# Patient Record
Sex: FEMALE | Race: WHITE | NOT HISPANIC OR LATINO | Employment: OTHER | URBAN - METROPOLITAN AREA
[De-identification: names, ages, dates, MRNs, and addresses within clinical notes are randomized per-mention and may not be internally consistent; named-entity substitution may affect disease eponyms.]

---

## 2017-02-03 LAB
25(OH)D3+25(OH)D2 SERPL-MCNC: 66.7 NG/ML (ref 30–100)
ALBUMIN SERPL-MCNC: 4.3 G/DL (ref 3.6–4.8)
ALBUMIN/GLOB SERPL: 2.3 {RATIO} (ref 1.1–2.5)
ALP SERPL-CCNC: 63 IU/L (ref 39–117)
ALT SERPL-CCNC: 17 IU/L (ref 0–32)
AST SERPL-CCNC: 19 IU/L (ref 0–40)
BASOPHILS # BLD AUTO: 0.1 X10E3/UL (ref 0–0.2)
BASOPHILS NFR BLD AUTO: 1 %
BILIRUB SERPL-MCNC: 0.3 MG/DL (ref 0–1.2)
BUN SERPL-MCNC: 22 MG/DL (ref 8–27)
BUN/CREAT SERPL: 22 (ref 11–26)
CALCIUM SERPL-MCNC: 9.5 MG/DL (ref 8.7–10.3)
CHLORIDE SERPL-SCNC: 101 MMOL/L (ref 96–106)
CHOLEST SERPL-MCNC: 193 MG/DL (ref 100–199)
CO2 SERPL-SCNC: 26 MMOL/L (ref 18–29)
COMMENT 011824: ABNORMAL
CREAT SERPL-MCNC: 0.98 MG/DL (ref 0.57–1)
EOSINOPHIL # BLD AUTO: 0.2 X10E3/UL (ref 0–0.4)
EOSINOPHIL NFR BLD AUTO: 5 %
ERYTHROCYTE [DISTWIDTH] IN BLOOD BY AUTOMATED COUNT: 13.5 % (ref 12.3–15.4)
GLOBULIN SER CALC-MCNC: 1.9 G/DL (ref 1.5–4.5)
GLUCOSE SERPL-MCNC: 107 MG/DL (ref 65–99)
HBA1C MFR BLD: 6 % (ref 4.8–5.6)
HCT VFR BLD AUTO: 46.5 % (ref 34–46.6)
HDLC SERPL-MCNC: 62 MG/DL
HGB BLD-MCNC: 15.2 G/DL (ref 11.1–15.9)
IMM GRANULOCYTES # BLD: 0 X10E3/UL (ref 0–0.1)
IMM GRANULOCYTES NFR BLD: 0 %
IMMATURE CELLS  115398: NORMAL
LDLC SERPL CALC-MCNC: 115 MG/DL (ref 0–99)
LYMPHOCYTES # BLD AUTO: 1.5 X10E3/UL (ref 0.7–3.1)
LYMPHOCYTES NFR BLD AUTO: 31 %
MCH RBC QN AUTO: 30.8 PG (ref 26.6–33)
MCHC RBC AUTO-ENTMCNC: 32.7 G/DL (ref 31.5–35.7)
MCV RBC AUTO: 94 FL (ref 79–97)
MONOCYTES # BLD AUTO: 0.3 X10E3/UL (ref 0.1–0.9)
MONOCYTES NFR BLD AUTO: 6 %
MORPHOLOGY BLD-IMP: NORMAL
NEUTROPHILS # BLD AUTO: 2.8 X10E3/UL (ref 1.4–7)
NEUTROPHILS NFR BLD AUTO: 57 %
NRBC BLD AUTO-RTO: NORMAL %
PLATELET # BLD AUTO: 172 X10E3/UL (ref 150–379)
POTASSIUM SERPL-SCNC: 4.8 MMOL/L (ref 3.5–5.2)
PROT SERPL-MCNC: 6.2 G/DL (ref 6–8.5)
RBC # BLD AUTO: 4.94 X10E6/UL (ref 3.77–5.28)
SODIUM SERPL-SCNC: 141 MMOL/L (ref 134–144)
TRIGL SERPL-MCNC: 82 MG/DL (ref 0–149)
TSH SERPL DL<=0.005 MIU/L-ACNC: 2.16 UIU/ML (ref 0.45–4.5)
VLDLC SERPL CALC-MCNC: 16 MG/DL (ref 5–40)
WBC # BLD AUTO: 4.9 X10E3/UL (ref 3.4–10.8)

## 2017-02-22 ENCOUNTER — OFFICE VISIT (OUTPATIENT)
Dept: CARDIOLOGY | Facility: MEDICAL CENTER | Age: 62
End: 2017-02-22
Payer: COMMERCIAL

## 2017-02-22 VITALS
OXYGEN SATURATION: 98 % | HEIGHT: 68 IN | SYSTOLIC BLOOD PRESSURE: 114 MMHG | DIASTOLIC BLOOD PRESSURE: 64 MMHG | BODY MASS INDEX: 18.64 KG/M2 | WEIGHT: 123 LBS | HEART RATE: 115 BPM

## 2017-02-22 DIAGNOSIS — E78.2 MIXED DYSLIPIDEMIA: ICD-10-CM

## 2017-02-22 DIAGNOSIS — I48.92 PAROXYSMAL ATRIAL FLUTTER (HCC): ICD-10-CM

## 2017-02-22 PROCEDURE — 99214 OFFICE O/P EST MOD 30 MIN: CPT | Performed by: INTERNAL MEDICINE

## 2017-02-22 RX ORDER — FLECAINIDE ACETATE 50 MG/1
50 TABLET ORAL 2 TIMES DAILY
Qty: 180 TAB | Refills: 11 | Status: SHIPPED | OUTPATIENT
Start: 2017-02-22 | End: 2018-02-27 | Stop reason: SDUPTHER

## 2017-02-22 RX ORDER — ESTRADIOL 2 MG/1
RING VAGINAL
COMMUNITY
Start: 2017-02-21 | End: 2019-02-01

## 2017-02-22 ASSESSMENT — ENCOUNTER SYMPTOMS
NAUSEA: 0
SPEECH CHANGE: 0
HEADACHES: 0
DOUBLE VISION: 0
ABDOMINAL PAIN: 0
BRUISES/BLEEDS EASILY: 0
WEIGHT LOSS: 0
HALLUCINATIONS: 0
VOMITING: 0
FEVER: 0
EYE PAIN: 0
DEPRESSION: 0
BLURRED VISION: 0
COUGH: 0
LOSS OF CONSCIOUSNESS: 0
DIZZINESS: 0
CLAUDICATION: 0
EYE DISCHARGE: 0
FALLS: 0
BLOOD IN STOOL: 0
SHORTNESS OF BREATH: 0
MYALGIAS: 0
PND: 0
ORTHOPNEA: 0
CHILLS: 0
SENSORY CHANGE: 0
PALPITATIONS: 0

## 2017-02-22 NOTE — PROGRESS NOTES
Subjective:   Anaya Browne is a 61 y.o. female who presents today for cardiac care of Paroxysmal a flutter (on flecainide now). In the interim, patient has been doing well without having any symptoms. Patient denies having chest pain, dyspnea, palpitation, presyncope, syncope episodes. Able to climb up at least 2 flights of stairs.    No episode of racing heart rate. Patient reduced dosing of Flecanide to 75 mg po bid.    No family history of a flutter or sudden cardiac death.    Past Medical History   Diagnosis Date   • Atrial flutter (CMS-HCC)      Past Surgical History   Procedure Laterality Date   • Hysterectomy laparoscopy     • Hysterectomy laparoscopy       History reviewed. No pertinent family history.  History   Smoking status   • Former Smoker -- 0.50 packs/day for 30 years   • Types: Cigarettes   • Quit date: 02/01/2012   Smokeless tobacco   • Never Used     No Known Allergies  Outpatient Encounter Prescriptions as of 2/22/2017   Medication Sig Dispense Refill   • ESTRING 2 MG vaginal ring      • flecainide (TAMBOCOR) 50 MG tablet Take 1 Tab by mouth 2 times a day. 180 Tab 11   • flecainide (TAMBOCOR) 150 MG Tab Take 0.5 Tabs by mouth 2 times a day. 90 Tab 0   • metformin (GLUCOPHAGE) 1000 MG tablet Take 1,000 mg by mouth 2 times a day, with meals.     • metformin (GLUCOPHAGE) 500 MG Tab      • conjugated estrogen (PREMARIN) 0.625 MG/GM Cream Insert 0.5 g in vagina every day.     • [DISCONTINUED] atorvastatin (LIPITOR) 10 MG Tab Take 10 mg by mouth every evening.       No facility-administered encounter medications on file as of 2/22/2017.     Review of Systems   Constitutional: Negative for fever, chills, weight loss and malaise/fatigue.   HENT: Negative for ear discharge, ear pain, hearing loss and nosebleeds.    Eyes: Negative for blurred vision, double vision, pain and discharge.   Respiratory: Negative for cough and shortness of breath.    Cardiovascular: Negative for chest pain,  "palpitations, orthopnea, claudication, leg swelling and PND.   Gastrointestinal: Negative for nausea, vomiting, abdominal pain, blood in stool and melena.   Genitourinary: Negative for dysuria and hematuria.   Musculoskeletal: Negative for myalgias, joint pain and falls.   Skin: Negative for itching and rash.   Neurological: Negative for dizziness, sensory change, speech change, loss of consciousness and headaches.   Endo/Heme/Allergies: Negative for environmental allergies. Does not bruise/bleed easily.   Psychiatric/Behavioral: Negative for depression, suicidal ideas and hallucinations.        Objective:   /64 mmHg  Pulse 115  Ht 1.727 m (5' 7.99\")  Wt 55.792 kg (123 lb)  BMI 18.71 kg/m2  SpO2 98%    Physical Exam   Constitutional: She is oriented to person, place, and time. No distress.   HENT:   Head: Normocephalic and atraumatic.   Eyes: EOM are normal.   Neck: No JVD present.   Cardiovascular: Normal rate, regular rhythm, normal heart sounds and intact distal pulses.  Exam reveals no gallop and no friction rub.    No murmur heard.  Pulmonary/Chest: No respiratory distress. She has no wheezes. She has no rales. She exhibits no tenderness.   Abdominal: She exhibits no distension. There is no tenderness. There is no rebound and no guarding.   Musculoskeletal: She exhibits no edema or tenderness.   Lymphadenopathy:     She has no cervical adenopathy.   Neurological: She is alert and oriented to person, place, and time.   Skin: Skin is dry.   Psychiatric: She has a normal mood and affect.   Nursing note and vitals reviewed.      Assessment:     1. Mixed dyslipidemia  COMP METABOLIC PANEL    LIPID PANEL    flecainide (TAMBOCOR) 50 MG tablet   2. Paroxysmal atrial flutter (CMS-HCC)  COMP METABOLIC PANEL    LIPID PANEL    flecainide (TAMBOCOR) 50 MG tablet       Medical Decision Making:  Today's Assessment / Status / Plan:     At this time patient is clinically stable in terms of her cardiac " standpoint.  Cont current medications at current dose.   Will reduce flecc to 50 mg po bid.    I will see patient back in clinic with lab tests and studies results in 12 months.

## 2017-02-22 NOTE — MR AVS SNAPSHOT
"        Anaya Browne   2017 8:00 AM   Office Visit   MRN: [id number]    Department:  [facility]   Dept Phone:  [phone]    Description:  Female : 1955   Provider:  Radha Parker M.D.           Reason for Visit     Follow-Up           Allergies as of 2017     No Known Allergies      You were diagnosed with     Mixed dyslipidemia   [149331]       Paroxysmal atrial flutter (CMS-HCC)   [300176]         Vital Signs     Blood Pressure Pulse Height Weight Body Mass Index Oxygen Saturation    114/64 mmHg 115 1.727 m (5' 7.99\") 55.792 kg (123 lb) 18.71 kg/m2 98%    Smoking Status                   Former Smoker           Basic Information     Date Of Birth Sex Race Ethnicity Preferred Language    1955 Female White Non- English      Problem List              ICD-10-CM Priority Class Noted - Resolved    Atrial flutter (CMS-HCC) I48.92 High  2013 - Present      Health Maintenance        Date Due Completion Dates    IMM DTaP/Tdap/Td Vaccine (1 - Tdap) 3/7/1975 ---    PAP SMEAR 3/7/1977 ---    MAMMOGRAM 3/7/1996 ---    COLONOSCOPY 3/7/2006 ---    IMM ZOSTER VACCINE 3/7/2016 ---    IMM INFLUENZA (1) 2016 ---            Current Immunizations     No immunizations on file.      Below and/or attached are the medications your provider expects you to take. Review all of your home medications and newly ordered medications with your provider and/or pharmacist. Follow medication instructions as directed by your provider and/or pharmacist. Please keep your medication list with you and share with your provider. Update the information when medications are discontinued, doses are changed, or new medications (including over-the-counter products) are added; and carry medication information at all times in the event of emergency situations     Allergies:  No Known Allergies          Medications  Valid as of: 2017 -  8:21 AM    Generic Name Brand Name Tablet Size Instructions " for use    Estradiol (RING) ESTRING 2 MG         Estrogens, Conjugated (Cream) PREMARIN 0.625 MG/GM Insert 0.5 g in vagina every day.        Flecainide Acetate (Tab) TAMBOCOR 150 MG Take 0.5 Tabs by mouth 2 times a day.        Flecainide Acetate (Tab) TAMBOCOR 50 MG Take 1 Tab by mouth 2 times a day.        MetFORMIN HCl (Tab) GLUCOPHAGE 1000 MG Take 1,000 mg by mouth 2 times a day, with meals.        MetFORMIN HCl (Tab) GLUCOPHAGE 500 MG         .                 Medicines prescribed today were sent to:     [id number] - [place], [place] - [address]    [address] [place] [address]    Phone: [phone] Fax: [phone]    Open 24 Hours?: No      Medication refill instructions:       If your prescription bottle indicates you have medication refills left, it is not necessary to call your provider’s office. Please contact your pharmacy and they will refill your medication.    If your prescription bottle indicates you do not have any refills left, you may request refills at any time through one of the following ways: The online [employer] system (except Urgent Care), by calling your provider’s office, or by asking your pharmacy to contact your provider’s office with a refill request. Medication refills are processed only during regular business hours and may not be available until the next business day. Your provider may request additional information or to have a follow-up visit with you prior to refilling your medication.   *Please Note: Medication refills are assigned a new Rx number when refilled electronically. Your pharmacy may indicate that no refills were authorized even though a new prescription for the same medication is available at the pharmacy. Please request the medicine by name with the pharmacy before contacting your provider for a refill.        Your To Do List     Future Labs/Procedures Complete By Expires    COMP METABOLIC PANEL  As directed 2/23/2018         [employer] Access Code:  Activation code not generated  Current MyChart Status: Active

## 2018-02-26 ENCOUNTER — TELEPHONE (OUTPATIENT)
Dept: CARDIOLOGY | Facility: MEDICAL CENTER | Age: 63
End: 2018-02-26

## 2018-02-27 ENCOUNTER — OFFICE VISIT (OUTPATIENT)
Dept: CARDIOLOGY | Facility: MEDICAL CENTER | Age: 63
End: 2018-02-27
Payer: COMMERCIAL

## 2018-02-27 VITALS
SYSTOLIC BLOOD PRESSURE: 108 MMHG | HEART RATE: 76 BPM | WEIGHT: 131 LBS | BODY MASS INDEX: 19.85 KG/M2 | DIASTOLIC BLOOD PRESSURE: 64 MMHG | HEIGHT: 68 IN | OXYGEN SATURATION: 98 %

## 2018-02-27 DIAGNOSIS — E78.2 MIXED DYSLIPIDEMIA: ICD-10-CM

## 2018-02-27 DIAGNOSIS — Z79.899 HIGH RISK MEDICATION USE: ICD-10-CM

## 2018-02-27 DIAGNOSIS — I48.92 PAROXYSMAL ATRIAL FLUTTER (HCC): ICD-10-CM

## 2018-02-27 DIAGNOSIS — J44.9 COPD, MILD (HCC): ICD-10-CM

## 2018-02-27 LAB — EKG IMPRESSION: NORMAL

## 2018-02-27 PROCEDURE — 93000 ELECTROCARDIOGRAM COMPLETE: CPT | Performed by: INTERNAL MEDICINE

## 2018-02-27 PROCEDURE — 99214 OFFICE O/P EST MOD 30 MIN: CPT | Performed by: INTERNAL MEDICINE

## 2018-02-27 RX ORDER — FLECAINIDE ACETATE 50 MG/1
50 TABLET ORAL 2 TIMES DAILY
Qty: 180 TAB | Refills: 3 | Status: SHIPPED | OUTPATIENT
Start: 2018-02-27 | End: 2019-02-01

## 2018-02-27 ASSESSMENT — ENCOUNTER SYMPTOMS
ORTHOPNEA: 0
SENSORY CHANGE: 0
BLOOD IN STOOL: 0
NAUSEA: 0
FALLS: 0
MYALGIAS: 0
BLURRED VISION: 0
CLAUDICATION: 0
PND: 0
DEPRESSION: 0
LOSS OF CONSCIOUSNESS: 0
DIZZINESS: 0
COUGH: 0
VOMITING: 0
ABDOMINAL PAIN: 0
DOUBLE VISION: 0
SHORTNESS OF BREATH: 0
WEIGHT LOSS: 0
CHILLS: 0
EYE DISCHARGE: 0
SPEECH CHANGE: 0
FEVER: 0
BRUISES/BLEEDS EASILY: 0
HEADACHES: 0
EYE PAIN: 0
HALLUCINATIONS: 0
PALPITATIONS: 0

## 2018-02-27 NOTE — PROGRESS NOTES
Subjective:   Anaya Browne is a 62 y.o. female who presents today for cardiac care of Paroxysmal a flutter (on flecainide now). In the interim, patient has been doing well without having any symptoms. Patient denies having chest pain, dyspnea, palpitation, presyncope, syncope episodes. Able to climb up at least 2 flights of stairs.     No episode of racing heart rate. Patient reduced dosing of Flecanide to 50 mg po bid.     No family history of a flutter or sudden cardiac death.       Past Medical History:   Diagnosis Date   • Atrial flutter (CMS-HCC)      Past Surgical History:   Procedure Laterality Date   • HYSTERECTOMY LAPAROSCOPY     • HYSTERECTOMY LAPAROSCOPY       Family History   Problem Relation Age of Onset   • Heart Disease Mother      History   Smoking Status   • Former Smoker   • Packs/day: 0.50   • Years: 30.00   • Types: Cigarettes   • Quit date: 2/1/2012   Smokeless Tobacco   • Never Used     No Known Allergies  Outpatient Encounter Prescriptions as of 2/27/2018   Medication Sig Dispense Refill   • flecainide (TAMBOCOR) 50 MG tablet Take 1 Tab by mouth 2 times a day. 180 Tab 3   • ESTRING 2 MG vaginal ring      • metformin (GLUCOPHAGE) 500 MG Tab Take 500 mg by mouth 2 times a day, with meals.     • [DISCONTINUED] flecainide (TAMBOCOR) 50 MG tablet Take 1 Tab by mouth 2 times a day. 180 Tab 11   • [DISCONTINUED] flecainide (TAMBOCOR) 150 MG Tab Take 0.5 Tabs by mouth 2 times a day. 90 Tab 0   • metformin (GLUCOPHAGE) 1000 MG tablet Take 1,000 mg by mouth 2 times a day, with meals.     • conjugated estrogen (PREMARIN) 0.625 MG/GM Cream Insert 0.5 g in vagina every day.       No facility-administered encounter medications on file as of 2/27/2018.      Review of Systems   Constitutional: Negative for chills, fever, malaise/fatigue and weight loss.   HENT: Negative for ear discharge, ear pain, hearing loss and nosebleeds.    Eyes: Negative for blurred vision, double vision, pain and discharge.  "  Respiratory: Negative for cough and shortness of breath.    Cardiovascular: Negative for chest pain, palpitations, orthopnea, claudication, leg swelling and PND.   Gastrointestinal: Negative for abdominal pain, blood in stool, melena, nausea and vomiting.   Genitourinary: Negative for dysuria and hematuria.   Musculoskeletal: Negative for falls, joint pain and myalgias.   Skin: Negative for itching and rash.   Neurological: Negative for dizziness, sensory change, speech change, loss of consciousness and headaches.   Endo/Heme/Allergies: Negative for environmental allergies. Does not bruise/bleed easily.   Psychiatric/Behavioral: Negative for depression, hallucinations and suicidal ideas.        Objective:   /64   Pulse 76   Ht 1.727 m (5' 8\")   Wt 59.4 kg (131 lb)   SpO2 98%   BMI 19.92 kg/m²     Physical Exam   Constitutional: She is oriented to person, place, and time. She appears well-developed and well-nourished.   HENT:   Head: Normocephalic and atraumatic.   Eyes: EOM are normal.   Neck: No JVD present.   Cardiovascular: Normal rate, regular rhythm, normal heart sounds and intact distal pulses.  Exam reveals no gallop and no friction rub.    No murmur heard.  Pulmonary/Chest: No respiratory distress. She has no wheezes. She has no rales. She exhibits no tenderness.   Abdominal: She exhibits no distension. There is no tenderness. There is no rebound and no guarding.   Musculoskeletal: She exhibits no edema or tenderness.   Lymphadenopathy:     She has no cervical adenopathy.   Neurological: She is alert and oriented to person, place, and time.   Skin: Skin is dry.   Psychiatric: She has a normal mood and affect.   Nursing note and vitals reviewed.      Assessment:     1. Paroxysmal atrial flutter (CMS-[facility])  [facility] EPIPHANY EKG (Clinic Performed)    flecainide (TAMBOCOR) 50 MG tablet    COMP METABOLIC PANEL    LIPID PANEL   2. Mixed dyslipidemia  flecainide (TAMBOCOR) 50 MG tablet   3. COPD, mild " (CMS-[facility])     4. High risk medication use  COMP METABOLIC PANEL    LIPID PANEL       Medical Decision Making:  Today's Assessment / Status / Plan:   At this time patient is clinically stable in terms of her cardiac standpoint.  Cont current medications at current dose.     I have reviewed patient's ECG, which shows normal sinus rhythm, normal KS, QT intervals. No evidence of acute coronary syndrome.    I will see patient back in clinic with lab tests and studies results in 12 months.    I thank you for referring patient to our Cardiology Clinic today.

## 2019-02-01 ENCOUNTER — OFFICE VISIT (OUTPATIENT)
Dept: CARDIOLOGY | Facility: MEDICAL CENTER | Age: 64
End: 2019-02-01
Payer: COMMERCIAL

## 2019-02-01 VITALS
HEART RATE: 88 BPM | SYSTOLIC BLOOD PRESSURE: 106 MMHG | HEIGHT: 68 IN | BODY MASS INDEX: 18.94 KG/M2 | WEIGHT: 125 LBS | OXYGEN SATURATION: 97 % | DIASTOLIC BLOOD PRESSURE: 70 MMHG

## 2019-02-01 DIAGNOSIS — Z79.899 HIGH RISK MEDICATION USE: ICD-10-CM

## 2019-02-01 DIAGNOSIS — E78.2 MIXED DYSLIPIDEMIA: ICD-10-CM

## 2019-02-01 DIAGNOSIS — E11.65 TYPE 2 DIABETES MELLITUS WITH HYPERGLYCEMIA, WITHOUT LONG-TERM CURRENT USE OF INSULIN (HCC): ICD-10-CM

## 2019-02-01 DIAGNOSIS — I48.0 PAF (PAROXYSMAL ATRIAL FIBRILLATION) (HCC): ICD-10-CM

## 2019-02-01 DIAGNOSIS — I48.92 PAROXYSMAL ATRIAL FLUTTER (HCC): ICD-10-CM

## 2019-02-01 LAB — EKG IMPRESSION: NORMAL

## 2019-02-01 PROCEDURE — 93000 ELECTROCARDIOGRAM COMPLETE: CPT | Performed by: INTERNAL MEDICINE

## 2019-02-01 PROCEDURE — 99214 OFFICE O/P EST MOD 30 MIN: CPT | Performed by: INTERNAL MEDICINE

## 2019-02-01 RX ORDER — ASPIRIN 325 MG
325 TABLET ORAL DAILY
Qty: 100 TAB | Refills: 3 | Status: SHIPPED | OUTPATIENT
Start: 2019-02-01 | End: 2019-08-05 | Stop reason: SDUPTHER

## 2019-02-01 RX ORDER — ALPRAZOLAM 0.5 MG/1
0.25 TABLET ORAL 3 TIMES DAILY PRN
COMMUNITY
Start: 2019-01-30 | End: 2023-03-29 | Stop reason: SDUPTHER

## 2019-02-01 ASSESSMENT — ENCOUNTER SYMPTOMS
PALPITATIONS: 0
LOSS OF CONSCIOUSNESS: 0
EYE PAIN: 0
ORTHOPNEA: 0
SENSORY CHANGE: 0
COUGH: 0
NAUSEA: 0
HEADACHES: 0
PND: 0
SPEECH CHANGE: 0
VOMITING: 0
ABDOMINAL PAIN: 0
DEPRESSION: 0
FEVER: 0
CHILLS: 0
DOUBLE VISION: 0
BLOOD IN STOOL: 0
BLURRED VISION: 0
DIZZINESS: 0
SHORTNESS OF BREATH: 0
BRUISES/BLEEDS EASILY: 0
EYE DISCHARGE: 0
HALLUCINATIONS: 0
MYALGIAS: 0
FALLS: 0
WEIGHT LOSS: 0
CLAUDICATION: 0

## 2019-02-01 NOTE — PROGRESS NOTES
Chief Complaint   Patient presents with   • Atrial Fibrillation     F/V: 1 YR       Subjective:   Anaya Browne is a 63 y.o. female who presents today for cardiac care of Paroxysmal a flutter (was on flecainide but she stopped it since last visit due to anxiety and depression.    In the interim, patient has been doing well without having any symptoms. Patient denies having chest pain, dyspnea, palpitation, presyncope, syncope episodes. Able to climb up at least 2 flights of stairs.     No episode of racing heart rate. Patient reduced dosing of Flecanide to 50 mg po bid.     No family history of a flutter or sudden cardiac death.    I have independently interpreted and reviewed patient's ECG with patient today, which showed normal sinus rhythm, normal MA, QT intervals. No evidence of acute coronary syndrome.         Past Medical History:   Diagnosis Date   • Atrial flutter (HCC)      Past Surgical History:   Procedure Laterality Date   • HYSTERECTOMY LAPAROSCOPY     • HYSTERECTOMY LAPAROSCOPY       Family History   Problem Relation Age of Onset   • Heart Disease Mother      Social History     Social History   • Marital status:      Spouse name: N/A   • Number of children: N/A   • Years of education: N/A     Occupational History   • Not on file.     Social History Main Topics   • Smoking status: Former Smoker     Packs/day: 0.50     Years: 30.00     Types: Cigarettes     Quit date: 2/1/2012   • Smokeless tobacco: Never Used   • Alcohol use 1.5 oz/week     3 Glasses of wine per week   • Drug use: No   • Sexual activity: Not on file     Other Topics Concern   • Not on file     Social History Narrative   • No narrative on file     No Known Allergies  Outpatient Encounter Prescriptions as of 2/1/2019   Medication Sig Dispense Refill   • aspirin (ASA) 325 MG Tab Take 1 Tab by mouth every day. 100 Tab 3   • metformin (GLUCOPHAGE) 500 MG Tab Take 500 mg by mouth 2 times a day, with meals.     • ALPRAZolam  "(XANAX) 0.5 MG Tab      • [DISCONTINUED] flecainide (TAMBOCOR) 50 MG tablet Take 1 Tab by mouth 2 times a day. (Patient not taking: Reported on 2/1/2019) 180 Tab 3   • [DISCONTINUED] ESTRING 2 MG vaginal ring      • [DISCONTINUED] metformin (GLUCOPHAGE) 1000 MG tablet Take 1,000 mg by mouth 2 times a day, with meals.     • [DISCONTINUED] conjugated estrogen (PREMARIN) 0.625 MG/GM Cream Insert 0.5 g in vagina every day.       No facility-administered encounter medications on file as of 2/1/2019.      Review of Systems   Constitutional: Negative for chills, fever, malaise/fatigue and weight loss.   HENT: Negative for ear discharge, ear pain, hearing loss and nosebleeds.    Eyes: Negative for blurred vision, double vision, pain and discharge.   Respiratory: Negative for cough and shortness of breath.    Cardiovascular: Negative for chest pain, palpitations, orthopnea, claudication, leg swelling and PND.   Gastrointestinal: Negative for abdominal pain, blood in stool, melena, nausea and vomiting.   Genitourinary: Negative for dysuria and hematuria.   Musculoskeletal: Negative for falls, joint pain and myalgias.   Skin: Negative for itching and rash.   Neurological: Negative for dizziness, sensory change, speech change, loss of consciousness and headaches.   Endo/Heme/Allergies: Negative for environmental allergies. Does not bruise/bleed easily.   Psychiatric/Behavioral: Negative for depression, hallucinations and suicidal ideas.        Objective:   /70 (BP Location: Right arm, Patient Position: Sitting, BP Cuff Size: Adult)   Pulse 88   Ht 1.727 m (5' 8\")   Wt 56.7 kg (125 lb)   SpO2 97%   BMI 19.01 kg/m²     Physical Exam   Constitutional: She is oriented to person, place, and time. No distress.   HENT:   Head: Normocephalic and atraumatic.   Right Ear: External ear normal.   Left Ear: External ear normal.   Eyes: Right eye exhibits no discharge. Left eye exhibits no discharge.   Neck: No JVD present. No " thyromegaly present.   Cardiovascular: Normal rate, regular rhythm, normal heart sounds and intact distal pulses.  Exam reveals no gallop and no friction rub.    No murmur heard.  Pulmonary/Chest: Breath sounds normal. No respiratory distress.   Abdominal: Bowel sounds are normal. She exhibits no distension. There is no tenderness.   Musculoskeletal: She exhibits no edema or tenderness.   Neurological: She is alert and oriented to person, place, and time. No cranial nerve deficit.   Skin: Skin is warm and dry. She is not diaphoretic.   Psychiatric: She has a normal mood and affect. Her behavior is normal.   Nursing note and vitals reviewed.      Assessment:     1. PAF (paroxysmal atrial fibrillation) (HCC)  EKG    aspirin (ASA) 325 MG Tab    COMP METABOLIC PANEL    LIPID PANEL   2. Mixed dyslipidemia  COMP METABOLIC PANEL    LIPID PANEL   3. High risk medication use  COMP METABOLIC PANEL    LIPID PANEL   4. Paroxysmal atrial flutter (HCC)  COMP METABOLIC PANEL    LIPID PANEL   5. Type 2 diabetes mellitus with hyperglycemia, without long-term current use of insulin (HCC)  aspirin (ASA) 325 MG Tab    COMP METABOLIC PANEL    LIPID PANEL       Medical Decision Making:  Today's Assessment / Status / Plan:   Ok to be off of Flec.  Lifestyle changes.  Stroke risk is 2 based on gender and DM.  Patient prefers no anticoagulation.  Will start Aspirin 325 mg po daily.    I will see patient back in clinic with lab tests and studies results in 6 months.    I thank you for referring patient to our Cardiology Clinic today.

## 2019-05-29 ENCOUNTER — HOSPITAL ENCOUNTER (OUTPATIENT)
Dept: RADIOLOGY | Facility: MEDICAL CENTER | Age: 64
End: 2019-05-29
Attending: CHIROPRACTOR
Payer: COMMERCIAL

## 2019-05-29 DIAGNOSIS — M99.04 SOMATIC DYSFUNCTION OF SACRAL REGION: ICD-10-CM

## 2019-05-29 DIAGNOSIS — M99.03 SOMATIC DYSFUNCTION OF LUMBAR REGION: ICD-10-CM

## 2019-05-29 DIAGNOSIS — M99.02 THORACIC SEGMENT DYSFUNCTION: ICD-10-CM

## 2019-05-29 PROCEDURE — 72170 X-RAY EXAM OF PELVIS: CPT

## 2019-05-29 PROCEDURE — 72100 X-RAY EXAM L-S SPINE 2/3 VWS: CPT

## 2019-05-29 PROCEDURE — 72070 X-RAY EXAM THORAC SPINE 2VWS: CPT

## 2019-07-23 LAB
ALBUMIN SERPL-MCNC: 4.6 G/DL (ref 3.6–4.8)
ALBUMIN/GLOB SERPL: 2.2 {RATIO} (ref 1.2–2.2)
ALP SERPL-CCNC: 77 IU/L (ref 39–117)
ALT SERPL-CCNC: 39 IU/L (ref 0–32)
AST SERPL-CCNC: 24 IU/L (ref 0–40)
BILIRUB SERPL-MCNC: 0.3 MG/DL (ref 0–1.2)
BUN SERPL-MCNC: 23 MG/DL (ref 8–27)
BUN/CREAT SERPL: 22 (ref 12–28)
CALCIUM SERPL-MCNC: 9.6 MG/DL (ref 8.7–10.3)
CHLORIDE SERPL-SCNC: 102 MMOL/L (ref 96–106)
CHOLEST SERPL-MCNC: 223 MG/DL (ref 100–199)
CO2 SERPL-SCNC: 24 MMOL/L (ref 20–29)
CREAT SERPL-MCNC: 1.06 MG/DL (ref 0.57–1)
GLOBULIN SER CALC-MCNC: 2.1 G/DL (ref 1.5–4.5)
GLUCOSE SERPL-MCNC: 105 MG/DL (ref 65–99)
HDLC SERPL-MCNC: 67 MG/DL
LABORATORY COMMENT REPORT: ABNORMAL
LDLC SERPL CALC-MCNC: 130 MG/DL (ref 0–99)
POTASSIUM SERPL-SCNC: 4.7 MMOL/L (ref 3.5–5.2)
PROT SERPL-MCNC: 6.7 G/DL (ref 6–8.5)
SODIUM SERPL-SCNC: 141 MMOL/L (ref 134–144)
TRIGL SERPL-MCNC: 130 MG/DL (ref 0–149)
VLDLC SERPL CALC-MCNC: 26 MG/DL (ref 5–40)

## 2019-08-05 ENCOUNTER — OFFICE VISIT (OUTPATIENT)
Dept: CARDIOLOGY | Facility: MEDICAL CENTER | Age: 64
End: 2019-08-05
Payer: COMMERCIAL

## 2019-08-05 VITALS
SYSTOLIC BLOOD PRESSURE: 114 MMHG | BODY MASS INDEX: 19.55 KG/M2 | WEIGHT: 129 LBS | OXYGEN SATURATION: 97 % | HEIGHT: 68 IN | DIASTOLIC BLOOD PRESSURE: 60 MMHG | HEART RATE: 70 BPM

## 2019-08-05 DIAGNOSIS — E78.2 MIXED HYPERLIPIDEMIA: ICD-10-CM

## 2019-08-05 DIAGNOSIS — I48.0 PAF (PAROXYSMAL ATRIAL FIBRILLATION) (HCC): ICD-10-CM

## 2019-08-05 DIAGNOSIS — Z79.899 HIGH RISK MEDICATION USE: ICD-10-CM

## 2019-08-05 DIAGNOSIS — E11.65 TYPE 2 DIABETES MELLITUS WITH HYPERGLYCEMIA, WITHOUT LONG-TERM CURRENT USE OF INSULIN (HCC): ICD-10-CM

## 2019-08-05 DIAGNOSIS — I48.92 ATRIAL FLUTTER, UNSPECIFIED TYPE (HCC): ICD-10-CM

## 2019-08-05 LAB — EKG IMPRESSION: NORMAL

## 2019-08-05 PROCEDURE — 93000 ELECTROCARDIOGRAM COMPLETE: CPT | Performed by: INTERNAL MEDICINE

## 2019-08-05 PROCEDURE — 99214 OFFICE O/P EST MOD 30 MIN: CPT | Performed by: NURSE PRACTITIONER

## 2019-08-05 RX ORDER — MAGNESIUM OXIDE 400 MG/1
400 TABLET ORAL DAILY
COMMUNITY

## 2019-08-05 ASSESSMENT — ENCOUNTER SYMPTOMS
CLAUDICATION: 0
PALPITATIONS: 1
DIZZINESS: 0
WEIGHT LOSS: 0
PND: 0
SHORTNESS OF BREATH: 0
ORTHOPNEA: 0
WEAKNESS: 0

## 2019-08-05 NOTE — PROGRESS NOTES
"Chief Complaint   Patient presents with   • Atrial Fibrillation       Subjective:   Anaya Browne is a 64 y.o. female patient of Dr. Don Parker who presents today for medication refill and review of laboratory work.    Patient was last seen by Dr. Parker on 2/1/2019. Patient had self reduced her dosing of flecainide to 50 mg p.o. twice daily and continue to refuse OAC for her atrial fibrillation and was continuing with full dose daily aspirin.  It was decided at that time to completely discontinue patient's flecainide.  No other significant changes were made.    Past medical history significant for paroxysmal atrial flutter, diabetes and prior thromboembolic event.    Today patient states that she has had to episodes of \"irregular\" heart rate in the last year, both of these episodes do not last longer than 1 minute and she was fairly asymptomatic.  Patient states that she stopped taking her flecainide as she believes it was causing her to become depressed.  She is inquiring today if she should restart her flecainide, she has recently re-started taking it in the last few days.    Patient also stopped taking her daily ASA as she states she heard about a week \"trial\" where they found that it was not beneficial and had more risk of harm than benefit.    Denies chest pain, palpitations, lower extremity edema or dizziness.    Past Medical History:   Diagnosis Date   • Atrial flutter (HCC)      Past Surgical History:   Procedure Laterality Date   • HYSTERECTOMY LAPAROSCOPY     • HYSTERECTOMY LAPAROSCOPY       Family History   Problem Relation Age of Onset   • Heart Disease Mother      Social History     Socioeconomic History   • Marital status:      Spouse name: Not on file   • Number of children: Not on file   • Years of education: Not on file   • Highest education level: Not on file   Occupational History   • Not on file   Social Needs   • Financial resource strain: Not on file   • Food insecurity:     Worry: " Not on file     Inability: Not on file   • Transportation needs:     Medical: Not on file     Non-medical: Not on file   Tobacco Use   • Smoking status: Former Smoker     Packs/day: 0.50     Years: 30.00     Pack years: 15.00     Types: Cigarettes     Last attempt to quit: 2012     Years since quittin.5   • Smokeless tobacco: Never Used   Substance and Sexual Activity   • Alcohol use: Yes     Alcohol/week: 1.5 oz     Types: 3 Glasses of wine per week   • Drug use: Yes     Frequency: 2.0 times per week     Comment: twice a week   • Sexual activity: Not on file   Lifestyle   • Physical activity:     Days per week: Not on file     Minutes per session: Not on file   • Stress: Not on file   Relationships   • Social connections:     Talks on phone: Not on file     Gets together: Not on file     Attends Caodaism service: Not on file     Active member of club or organization: Not on file     Attends meetings of clubs or organizations: Not on file     Relationship status: Not on file   • Intimate partner violence:     Fear of current or ex partner: Not on file     Emotionally abused: Not on file     Physically abused: Not on file     Forced sexual activity: Not on file   Other Topics Concern   • Not on file   Social History Narrative   • Not on file     No Known Allergies  Outpatient Encounter Medications as of 2019   Medication Sig Dispense Refill   • magnesium oxide (MAG-OX) 400 MG Tab Take 400 mg by mouth every day.     • aspirin 81 MG tablet Take 1 Tab by mouth every day. 30 Tab 3   • ALPRAZolam (XANAX) 0.5 MG Tab      • metformin (GLUCOPHAGE) 500 MG Tab Take 500 mg by mouth 2 times a day, with meals.     • [DISCONTINUED] flecainide (TAMBOCOR) 20 mg/mL Suspension Take  by mouth.     • [DISCONTINUED] aspirin (ASA) 325 MG Tab Take 1 Tab by mouth every day. (Patient not taking: Reported on 2019) 100 Tab 3     No facility-administered encounter medications on file as of 2019.      Review of Systems  "  Constitutional: Negative for malaise/fatigue and weight loss.   Respiratory: Negative for shortness of breath.    Cardiovascular: Positive for palpitations. Negative for chest pain, orthopnea, claudication, leg swelling and PND.   Neurological: Negative for dizziness and weakness.   All other systems reviewed and are negative.       Objective:   /60 (BP Location: Right arm, Patient Position: Sitting, BP Cuff Size: Adult)   Pulse 70   Ht 1.727 m (5' 8\")   Wt 58.5 kg (129 lb)   SpO2 97%   BMI 19.61 kg/m²     Physical Exam   Constitutional: She is oriented to person, place, and time. She appears well-developed and well-nourished. No distress.   HENT:   Head: Normocephalic.   Eyes: EOM are normal.   Neck: No JVD present.   Cardiovascular: Normal rate, regular rhythm and normal heart sounds.   Pulmonary/Chest: Effort normal and breath sounds normal. No respiratory distress.   Abdominal: Soft. There is no tenderness.   Musculoskeletal: She exhibits no edema.   Neurological: She is alert and oriented to person, place, and time.   Skin: Skin is warm and dry.   Psychiatric: She has a normal mood and affect. Her behavior is normal. Thought content normal.        TTE 10/27/12:  CONCLUSIONS  Normal echocardiogram.   Left ventricular ejection fraction is 60% to 65%.  Regional wall motion is normal.   Lexiscan 10/27/12:  IMPRESSION:  1.  No evidence for ischemia.  2.  No evidence of prior myocardial infarction.  3.  Preserved left ventricular function at 70% without any regional wall motion abnormalities.  4.  No evidence for adenosine-induced ischemia on EKG.    7/22/19:      Assessment:     1. Atrial flutter, unspecified type ([facility])  EKG    Comp Metabolic Panel   2. Mixed hyperlipidemia  LIPID PANEL   3. PAF (paroxysmal atrial fibrillation) ([facility])  aspirin 81 MG tablet   4. Type 2 diabetes mellitus with hyperglycemia, without long-term current use of insulin ([facility])  aspirin 81 MG tablet   5. High risk medication use   "       Medical Decision Making:  Today's Assessment / Status / Plan:   PAF:  -EKG today showing normal sinus rhythm with stable UT and QRS duration.  -Discussed with patient that the side effect of flecainide may outweigh the benefit since her symptoms are so mild and rare.  -Pill in pocket approach was also discussed with patient.   -Thorough discussion with patient regarding her risk and chads 2 Vasc score of at least 3 (female and history of diabetes) and that at the very least she should be on ASA 81 mg daily, preferably chronic OAC.  Patient is not interested in chronic OAC, she is agreeable to start daily low-dose aspirin.    HLD:  -Last lipid panel on 7/22/19 showed  and .  -Patient admits to not following her diet as closely as she used to.  -Lifestyle modification discussed.  -Patient would like to start daily red yeast rice.  -Recheck lipid panel and CMP (ALT 39 on 7/22/19) in 3 months.    Patient will follow-up with Dr. Don Parker in 3 months or earlier if needed.  Encouraged patient to contact office should any questions or concerns arise meantime.  Patient understands and agrees a plan of care.    Future Appointments   Date Time Provider Department Center   11/18/2019  7:45 AM Radha Parker M.D. [facility] None     Collaborating Provider: Dr. Don Parker     Please note that this dictation was created using voice recognition software. I have made every reasonable attempt to correct obvious errors, but I expect that there are errors of grammar and possibly content I did not discover before finalizing the note.

## 2019-11-12 LAB
ALBUMIN SERPL-MCNC: 4.5 G/DL (ref 3.6–4.8)
ALBUMIN/GLOB SERPL: 2.1 {RATIO} (ref 1.2–2.2)
ALP SERPL-CCNC: 65 IU/L (ref 39–117)
ALT SERPL-CCNC: 13 IU/L (ref 0–32)
AST SERPL-CCNC: 21 IU/L (ref 0–40)
BILIRUB SERPL-MCNC: 0.4 MG/DL (ref 0–1.2)
BUN SERPL-MCNC: 20 MG/DL (ref 8–27)
BUN/CREAT SERPL: 18 (ref 12–28)
CALCIUM SERPL-MCNC: 9.9 MG/DL (ref 8.7–10.3)
CHLORIDE SERPL-SCNC: 104 MMOL/L (ref 96–106)
CHOLEST SERPL-MCNC: 218 MG/DL (ref 100–199)
CO2 SERPL-SCNC: 24 MMOL/L (ref 20–29)
CREAT SERPL-MCNC: 1.09 MG/DL (ref 0.57–1)
GLOBULIN SER CALC-MCNC: 2.1 G/DL (ref 1.5–4.5)
GLUCOSE SERPL-MCNC: 123 MG/DL (ref 65–99)
HDLC SERPL-MCNC: 58 MG/DL
LABORATORY COMMENT REPORT: ABNORMAL
LDLC SERPL CALC-MCNC: 141 MG/DL (ref 0–99)
POTASSIUM SERPL-SCNC: 4.9 MMOL/L (ref 3.5–5.2)
PROT SERPL-MCNC: 6.6 G/DL (ref 6–8.5)
SODIUM SERPL-SCNC: 143 MMOL/L (ref 134–144)
TRIGL SERPL-MCNC: 95 MG/DL (ref 0–149)
VLDLC SERPL CALC-MCNC: 19 MG/DL (ref 5–40)

## 2019-11-18 ENCOUNTER — OFFICE VISIT (OUTPATIENT)
Dept: CARDIOLOGY | Facility: MEDICAL CENTER | Age: 64
End: 2019-11-18
Payer: COMMERCIAL

## 2019-11-18 VITALS
OXYGEN SATURATION: 99 % | HEIGHT: 68 IN | SYSTOLIC BLOOD PRESSURE: 92 MMHG | DIASTOLIC BLOOD PRESSURE: 62 MMHG | WEIGHT: 135.6 LBS | BODY MASS INDEX: 20.55 KG/M2 | HEART RATE: 78 BPM

## 2019-11-18 DIAGNOSIS — I48.0 PAF (PAROXYSMAL ATRIAL FIBRILLATION) (HCC): ICD-10-CM

## 2019-11-18 DIAGNOSIS — Z79.899 HIGH RISK MEDICATION USE: ICD-10-CM

## 2019-11-18 DIAGNOSIS — E78.2 MIXED HYPERLIPIDEMIA: ICD-10-CM

## 2019-11-18 DIAGNOSIS — E11.65 TYPE 2 DIABETES MELLITUS WITH HYPERGLYCEMIA, WITHOUT LONG-TERM CURRENT USE OF INSULIN (HCC): ICD-10-CM

## 2019-11-18 PROCEDURE — 99214 OFFICE O/P EST MOD 30 MIN: CPT | Performed by: INTERNAL MEDICINE

## 2019-11-18 RX ORDER — SALICYLIC ACID 40 %
81 ADHESIVE PATCH, MEDICATED TOPICAL
Refills: 3 | COMMUNITY
Start: 2019-10-07 | End: 2019-11-18

## 2019-11-18 RX ORDER — ROSUVASTATIN CALCIUM 5 MG/1
5 TABLET, COATED ORAL EVERY EVENING
Qty: 30 TAB | Refills: 11 | Status: SHIPPED | OUTPATIENT
Start: 2019-11-18 | End: 2019-11-18 | Stop reason: SDUPTHER

## 2019-11-18 RX ORDER — INFLUENZA A VIRUS A/BRISBANE/02/2018 IVR-190 (H1N1) ANTIGEN (FORMALDEHYDE INACTIVATED), INFLUENZA A VIRUS A/KANSAS/14/2017 X-327 (H3N2) ANTIGEN (FORMALDEHYDE INACTIVATED), INFLUENZA B VIRUS B/PHUKET/3073/2013 ANTIGEN (FORMALDEHYDE INACTIVATED), AND INFLUENZA B VIRUS B/MARYLAND/15/2016 BX-69A ANTIGEN (FORMALDEHYDE INACTIVATED) 15; 15; 15; 15 UG/.5ML; UG/.5ML; UG/.5ML; UG/.5ML
INJECTION, SUSPENSION INTRAMUSCULAR
Refills: 0 | COMMUNITY
Start: 2019-09-24 | End: 2019-11-18

## 2019-11-18 RX ORDER — ROSUVASTATIN CALCIUM 5 MG/1
5 TABLET, COATED ORAL EVERY EVENING
Qty: 90 TAB | Refills: 3 | Status: SHIPPED | OUTPATIENT
Start: 2019-11-18 | End: 2020-09-09

## 2019-11-18 ASSESSMENT — ENCOUNTER SYMPTOMS
WEIGHT LOSS: 0
CLAUDICATION: 0
BLOOD IN STOOL: 0
FEVER: 0
MYALGIAS: 0
EYE DISCHARGE: 0
NAUSEA: 0
CHILLS: 0
HALLUCINATIONS: 0
SPEECH CHANGE: 0
DEPRESSION: 0
ORTHOPNEA: 0
PALPITATIONS: 0
VOMITING: 0
PND: 0
ABDOMINAL PAIN: 0
DIZZINESS: 0
DOUBLE VISION: 0
FALLS: 0
SHORTNESS OF BREATH: 0
EYE PAIN: 0
COUGH: 0
BRUISES/BLEEDS EASILY: 0
LOSS OF CONSCIOUSNESS: 0
HEADACHES: 0
SENSORY CHANGE: 0
BLURRED VISION: 0

## 2019-11-18 NOTE — PROGRESS NOTES
Chief Complaint   Patient presents with   • Shortness of Breath       Subjective:   Anaya Browne is a 64 y.o. female who presents today for cardiac care of Paroxysmal a flutter (was on flecainide but she stopped it since last visit due to anxiety and depression.     In the interim, patient has been doing well without having any symptoms. Patient denies having chest pain, dyspnea, palpitation, presyncope, syncope episodes. Able to climb up at least 2 flights of stairs.     No episode of racing heart rate. Patient reduced dosing of Flecanide to 50 mg po bid.     No family history of a flutter or sudden cardiac death.     I have independently interpreted and reviewed patient's ECG with patient today, which showed normal sinus rhythm, normal CT, QT intervals. No evidence of acute coronary syndrome.    Past Medical History:   Diagnosis Date   • Atrial flutter (HCC)      Past Surgical History:   Procedure Laterality Date   • HYSTERECTOMY LAPAROSCOPY     • HYSTERECTOMY LAPAROSCOPY       Family History   Problem Relation Age of Onset   • Heart Disease Mother      Social History     Socioeconomic History   • Marital status:      Spouse name: Not on file   • Number of children: Not on file   • Years of education: Not on file   • Highest education level: Not on file   Occupational History   • Not on file   Social Needs   • Financial resource strain: Not on file   • Food insecurity:     Worry: Not on file     Inability: Not on file   • Transportation needs:     Medical: Not on file     Non-medical: Not on file   Tobacco Use   • Smoking status: Former Smoker     Packs/day: 0.50     Years: 30.00     Pack years: 15.00     Types: Cigarettes     Last attempt to quit: 2012     Years since quittin.8   • Smokeless tobacco: Never Used   Substance and Sexual Activity   • Alcohol use: Yes     Alcohol/week: 1.5 oz     Types: 3 Glasses of wine per week   • Drug use: Yes     Frequency: 2.0 times per week     Comment:  twice a week   • Sexual activity: Not on file   Lifestyle   • Physical activity:     Days per week: Not on file     Minutes per session: Not on file   • Stress: Not on file   Relationships   • Social connections:     Talks on phone: Not on file     Gets together: Not on file     Attends Hoahaoism service: Not on file     Active member of club or organization: Not on file     Attends meetings of clubs or organizations: Not on file     Relationship status: Not on file   • Intimate partner violence:     Fear of current or ex partner: Not on file     Emotionally abused: Not on file     Physically abused: Not on file     Forced sexual activity: Not on file   Other Topics Concern   • Not on file   Social History Narrative   • Not on file     No Known Allergies  Outpatient Encounter Medications as of 11/18/2019   Medication Sig Dispense Refill   • L-THEANINE  mg 2 Times a Day.     • rosuvastatin (CRESTOR) 5 MG Tab Take 1 Tab by mouth every evening. 90 Tab 3   • magnesium oxide (MAG-OX) 400 MG Tab Take 400 mg by mouth every day.     • aspirin 81 MG tablet Take 1 Tab by mouth every day. 30 Tab 3   • ALPRAZolam (XANAX) 0.5 MG Tab      • metformin (GLUCOPHAGE) 500 MG Tab Take 500 mg by mouth 2 times a day, with meals.     • [DISCONTINUED] FLUZONE QUADRIVALENT 0.5 ML Suspension Prefilled Syringe injection TO BE ADMINISTERED BY PHARMACIST FOR IMMUNIZATION  0   • [DISCONTINUED] [employer] ASPIRIN LOW DOSE 81 MG EC tablet Take 81 mg by mouth every day.  3   • [DISCONTINUED] rosuvastatin (CRESTOR) 5 MG Tab Take 1 Tab by mouth every evening. 30 Tab 11     No facility-administered encounter medications on file as of 11/18/2019.      Review of Systems   Constitutional: Negative for chills, fever, malaise/fatigue and weight loss.   HENT: Negative for ear discharge, ear pain, hearing loss and nosebleeds.    Eyes: Negative for blurred vision, double vision, pain and discharge.   Respiratory: Negative for cough and shortness of breath.   "  Cardiovascular: Negative for chest pain, palpitations, orthopnea, claudication, leg swelling and PND.   Gastrointestinal: Negative for abdominal pain, blood in stool, melena, nausea and vomiting.   Genitourinary: Negative for dysuria and hematuria.   Musculoskeletal: Negative for falls, joint pain and myalgias.   Skin: Negative for itching and rash.   Neurological: Negative for dizziness, sensory change, speech change, loss of consciousness and headaches.   Endo/Heme/Allergies: Negative for environmental allergies. Does not bruise/bleed easily.   Psychiatric/Behavioral: Negative for depression, hallucinations and suicidal ideas.        Objective:   BP (!) 92/62 (BP Location: Right arm, Patient Position: Sitting, BP Cuff Size: Adult)   Pulse 78   Ht 1.727 m (5' 8\")   Wt 61.5 kg (135 lb 9.6 oz)   SpO2 99%   BMI 20.62 kg/m²     Physical Exam   Constitutional: She is oriented to person, place, and time. No distress.   HENT:   Head: Normocephalic and atraumatic.   Right Ear: External ear normal.   Left Ear: External ear normal.   Eyes: Right eye exhibits no discharge. Left eye exhibits no discharge.   Neck: No JVD present. No thyromegaly present.   Cardiovascular: Normal rate, regular rhythm, normal heart sounds and intact distal pulses. Exam reveals no gallop and no friction rub.   No murmur heard.  Pulmonary/Chest: Breath sounds normal. No respiratory distress.   Abdominal: Bowel sounds are normal. She exhibits no distension. There is no tenderness.   Musculoskeletal:         General: No tenderness or edema.   Neurological: She is alert and oriented to person, place, and time. No cranial nerve deficit.   Skin: Skin is warm and dry. She is not diaphoretic.   Psychiatric: She has a normal mood and affect. Her behavior is normal.   Nursing note and vitals reviewed.      Assessment:     1. Mixed hyperlipidemia  Comp Metabolic Panel    LIPID PANEL   2. PAF (paroxysmal atrial fibrillation) (HCC)     3. Type 2 diabetes " mellitus with hyperglycemia, without long-term current use of insulin (HCC)     4. High risk medication use  Comp Metabolic Panel       Medical Decision Making:  Today's Assessment / Status / Plan:   Trial of Rosuvastatin 5 mg po qhs due to elevated LDL.  Ok to be off of Flec.  Lifestyle changes.  Stroke risk is 2 based on gender and DM.  Patient prefers no anticoagulation.  Continue Aspirin 325 mg po daily.     I will see patient back in clinic with lab tests and studies results in 6 months.     I thank you for referring patient to our Cardiology Clinic today.

## 2019-12-23 DIAGNOSIS — I48.0 PAF (PAROXYSMAL ATRIAL FIBRILLATION) (HCC): ICD-10-CM

## 2019-12-23 DIAGNOSIS — E11.65 TYPE 2 DIABETES MELLITUS WITH HYPERGLYCEMIA, WITHOUT LONG-TERM CURRENT USE OF INSULIN (HCC): ICD-10-CM

## 2019-12-26 RX ORDER — SALICYLIC ACID 40 %
ADHESIVE PATCH, MEDICATED TOPICAL
Qty: 90 TAB | Refills: 3 | Status: SHIPPED | OUTPATIENT
Start: 2019-12-26 | End: 2020-09-09

## 2020-01-27 ENCOUNTER — TELEPHONE (OUTPATIENT)
Dept: PULMONOLOGY | Facility: HOSPICE | Age: 65
End: 2020-01-27

## 2020-01-27 NOTE — TELEPHONE ENCOUNTER
"Received a call from Dr Pradip Alegria D.O.,P.H.M. He has faxed to us, a hand written request ordering a PFT but states he also wants a pulmonary consult. We had received this request but we were unable to process because there weren't any office notes or demographics that were sent with his hand written referral. We had tried to contact his office for records but was told he does not have any as he does \"Home Treatment services\". He has no clinical staff and is only willing to speak with the pulmonary physician that will be seeing his patient. He left his cell phone number only [phone].     Message was relayed to our new patient scheduling input desk for review.     Hand written referral  Was scanned into our media tab.  "

## 2020-02-13 ENCOUNTER — HOSPITAL ENCOUNTER (OUTPATIENT)
Dept: RADIOLOGY | Facility: MEDICAL CENTER | Age: 65
End: 2020-02-13
Payer: COMMERCIAL

## 2020-02-19 DIAGNOSIS — R06.02 SOB (SHORTNESS OF BREATH): ICD-10-CM

## 2020-02-26 ENCOUNTER — OFFICE VISIT (OUTPATIENT)
Dept: PULMONOLOGY | Facility: HOSPICE | Age: 65
End: 2020-02-26
Payer: COMMERCIAL

## 2020-02-26 ENCOUNTER — NON-PROVIDER VISIT (OUTPATIENT)
Dept: PULMONOLOGY | Facility: HOSPICE | Age: 65
End: 2020-02-26
Attending: INTERNAL MEDICINE
Payer: COMMERCIAL

## 2020-02-26 VITALS
OXYGEN SATURATION: 98 % | BODY MASS INDEX: 20.46 KG/M2 | SYSTOLIC BLOOD PRESSURE: 106 MMHG | HEIGHT: 68 IN | DIASTOLIC BLOOD PRESSURE: 62 MMHG | WEIGHT: 135 LBS | HEART RATE: 77 BPM

## 2020-02-26 VITALS — WEIGHT: 135 LBS | BODY MASS INDEX: 20.46 KG/M2 | HEIGHT: 68 IN

## 2020-02-26 DIAGNOSIS — K21.9 GASTROESOPHAGEAL REFLUX DISEASE, ESOPHAGITIS PRESENCE NOT SPECIFIED: ICD-10-CM

## 2020-02-26 DIAGNOSIS — J43.9 PULMONARY EMPHYSEMA, UNSPECIFIED EMPHYSEMA TYPE (HCC): ICD-10-CM

## 2020-02-26 DIAGNOSIS — J32.9 CHRONIC SINUSITIS, UNSPECIFIED LOCATION: ICD-10-CM

## 2020-02-26 DIAGNOSIS — J44.89 ASTHMA-COPD OVERLAP SYNDROME (HCC): ICD-10-CM

## 2020-02-26 PROCEDURE — 94060 EVALUATION OF WHEEZING: CPT | Performed by: INTERNAL MEDICINE

## 2020-02-26 PROCEDURE — 94729 DIFFUSING CAPACITY: CPT | Performed by: INTERNAL MEDICINE

## 2020-02-26 PROCEDURE — 99204 OFFICE O/P NEW MOD 45 MIN: CPT | Mod: 25 | Performed by: INTERNAL MEDICINE

## 2020-02-26 PROCEDURE — 94726 PLETHYSMOGRAPHY LUNG VOLUMES: CPT | Performed by: INTERNAL MEDICINE

## 2020-02-26 RX ORDER — CETIRIZINE HYDROCHLORIDE 10 MG/1
10 TABLET ORAL DAILY
COMMUNITY
End: 2022-10-19

## 2020-02-26 RX ORDER — BUDESONIDE AND FORMOTEROL FUMARATE DIHYDRATE 160; 4.5 UG/1; UG/1
2 AEROSOL RESPIRATORY (INHALATION) 2 TIMES DAILY
Qty: 1 INHALER | Refills: 0 | Status: SHIPPED | OUTPATIENT
Start: 2020-02-26 | End: 2020-03-06 | Stop reason: SDUPTHER

## 2020-02-26 RX ORDER — MONTELUKAST SODIUM 10 MG/1
1 TABLET ORAL EVERY EVENING
COMMUNITY
Start: 2020-01-08 | End: 2022-10-19

## 2020-02-26 ASSESSMENT — PULMONARY FUNCTION TESTS
FEV1/FVC: 70.57
FVC_PREDICTED: 3.5
FEV1_PERCENT_CHANGE: 6
FEV1_PERCENT_PREDICTED: 82
FEV1/FVC_PERCENT_PREDICTED: 88
FVC: 3.11
FEV1/FVC_PREDICTED: 79
FEV1/FVC_PERCENT_PREDICTED: 91
FVC_PERCENT_PREDICTED: 88
FEV1_PERCENT_PREDICTED: 77
FEV1/FVC: 71
FEV1/FVC_PERCENT_PREDICTED: 86
FEV1: 2.1
FVC: 3.16
FEV1/FVC_PERCENT_CHANGE: 4
FEV1/FVC_PERCENT_CHANGE: 600
FEV1/FVC: 68
FEV1/FVC_PERCENT_LLN: 66
FVC_PERCENT_PREDICTED: 90
FEV1/FVC_PERCENT_PREDICTED: 78
FEV1_PERCENT_CHANGE: 1
FEV1/FVC_PERCENT_PREDICTED: 89
FEV1_LLN: 2.27
FVC_LLN: 2.92
FEV1: 2.23
FEV1_PREDICTED: 2.72
FEV1/FVC: 68

## 2020-02-26 ASSESSMENT — ENCOUNTER SYMPTOMS
FOCAL WEAKNESS: 0
DIARRHEA: 0
FEVER: 0
EYE DISCHARGE: 0
EYE PAIN: 0
SORE THROAT: 0
SPUTUM PRODUCTION: 1
WHEEZING: 0
HEMOPTYSIS: 0
CHILLS: 0
SINUS PAIN: 0
PSYCHIATRIC NEGATIVE: 1
HEADACHES: 0
VOMITING: 0
COUGH: 1
SENSORY CHANGE: 0
MYALGIAS: 0
NAUSEA: 0
DIZZINESS: 0
WEIGHT LOSS: 0
LOSS OF CONSCIOUSNESS: 0
ORTHOPNEA: 1
SHORTNESS OF BREATH: 0
ABDOMINAL PAIN: 0
STRIDOR: 0

## 2020-02-26 NOTE — PROCEDURES
Technician: JENY Jewell    Technician Comment:  Good patient effort & cooperation.  The results of this test meet the ATS/ERS standards for acceptability & reproducibility.  Test was performed on the [employer] Body Plethysmograph-Elite DX system.  Predicted values were GLI-2012 for spirometry, GLI-2017 for DLCO, ITS for Lung Volumes.  The DLCO was uncorrected for Hgb.  A bronchodilator of Ventolin HFA -2puffs via spacer administered.  DLCO performed during dilation period.    Interpretation:  1. There is no significant obstructive ventilatory defect on spirometry. The peak expiratory flow is reduced and there is no significant response to bronchodilators.  2. Lung volumes are consistent with air trapping.   3. Diffusion capacity is supranormal.   4. While there is no significant obstruction on spirometry, flow volume loop suggests obstruction and restriction.  5. Compared to prior testing in 2012, the FEV1 has declined from 2.58L to 2.10L.  __________  Tanner Cruz MD  Pulmonary and Critical Care Medicine  [facility]

## 2020-02-26 NOTE — PROGRESS NOTES
Pulmonary Clinic- Initial Consult    Date of Service: 2/26/2020    Referring Physician: Pradip Alegria D.O.    Reason for Consult: Dyspnea    Chief Complaint:   Chief Complaint   Patient presents with   • Establish Care     Referred by Pradip Alegria D.O for Dyspnea   • Results     PFT 02/26/2020     HPI:   Anaya Browne is a very pleasant 64 y.o. female former tobacco smoker 15 pack years, quit 2012 who is followed by Pradip Alegria D.O. and is referred to the pulmonary clinic for dyspnea. Patient has not been seen by pulmonary in the past.    PFTs in 2012 personally reviewed showing mild obstruction, no bronchodilator response, normal diffusion capacity and lung volumes. Repeat PFTs today suggestive of obstruction with air trapping with no bronchodilator response. FEV1 has declined from 2.58 to 2.10L since 2012.    No recent chest imaging.    Subjectively, Korin reports a cough since 11/2019, rarely productive of clear phlegm, never blood. It is particularly prevalent in the mornings and after meals. She is not limited during exercise. No cough at night but does endorse orthopnea. She has been taking zyrtec and singulair without improvement. She is reluctant to start too many new medications.    History of paroxysmal atrial fibrillation, previously on flecainide recently discontinued, patient declined anticoagulation, on aspirin 325 p.o. daily, followed closely by  [facility].    Past Medical History:   Diagnosis Date   • Atrial fibrillation (HCC)    • Atrial flutter (HCC)    • Back pain    • Chickenpox    • Cough    • Daytime sleepiness    • Depression    • Diabetes (HCC)    • Hoarseness, persistent    • Influenza    • Mumps    • Shortness of breath    • Skin change    • Sputum production    • Sweat, sweating, excessive    • Wears glasses      Past Surgical History:   Procedure Laterality Date   • HYSTERECTOMY LAPAROSCOPY     • HYSTERECTOMY LAPAROSCOPY       Social History     Socioeconomic  History   • Marital status:      Spouse name: Not on file   • Number of children: Not on file   • Years of education: Not on file   • Highest education level: Not on file   Occupational History   • Not on file   Social Needs   • Financial resource strain: Not on file   • Food insecurity     Worry: Not on file     Inability: Not on file   • Transportation needs     Medical: Not on file     Non-medical: Not on file   Tobacco Use   • Smoking status: Former Smoker     Packs/day: 0.50     Years: 30.00     Pack years: 15.00     Types: Cigarettes     Last attempt to quit: 2012     Years since quittin.0   • Smokeless tobacco: Never Used   Substance and Sexual Activity   • Alcohol use: Yes     Alcohol/week: 1.5 oz     Types: 3 Glasses of wine per week   • Drug use: Yes     Frequency: 2.0 times per week     Comment: twice a week   • Sexual activity: Not on file   Lifestyle   • Physical activity     Days per week: Not on file     Minutes per session: Not on file   • Stress: Not on file   Relationships   • Social connections     Talks on phone: Not on file     Gets together: Not on file     Attends [employer] service: Not on file     Active member of club or organization: Not on file     Attends meetings of clubs or organizations: Not on file     Relationship status: Not on file   • Intimate partner violence     Fear of current or ex partner: Not on file     Emotionally abused: Not on file     Physically abused: Not on file     Forced sexual activity: Not on file   Other Topics Concern   • Not on file   Social History Narrative   • Not on file          Family History   Problem Relation Age of Onset   • Heart Disease Mother    • Breast Cancer Mother    • Lung Cancer Father    • Dementia Brother         vascular       Current Outpatient Medications on File Prior to Visit   Medication Sig Dispense Refill   • montelukast (SINGULAIR) 10 MG Tab Take 1 Tab by mouth every bedtime.     • Multiple Minerals  "(CALCIUM/MAGNESIUM/ZINC PO) Take  by mouth every day.     • cetirizine (KLS ALLER-DANYELLE) 10 MG Tab Take 10 mg by mouth every day.     • [facility] ASPIRIN LOW DOSE 81 MG EC tablet TAKE 1 TABLET BY MOUTH EVERY DAY 90 Tab 3   • L-THEANINE  mg 2 Times a Day.     • magnesium oxide (MAG-OX) 400 MG Tab Take 400 mg by mouth every day.     • ALPRAZolam (XANAX) 0.5 MG Tab Take 0.25 mg by mouth at bedtime as needed.     • metformin (GLUCOPHAGE) 500 MG Tab Take 500 mg by mouth 2 times a day, with meals.     • aspirin 81 MG tablet TAKE 1 TABLET BY MOUTH EVERY DAY     • rosuvastatin (CRESTOR) 5 MG Tab Take 1 Tab by mouth every evening. (Patient not taking: Reported on 2/26/2020) 90 Tab 3     No current facility-administered medications on file prior to visit.        Allergies: Patient has no known allergies.      ROS:   Review of Systems   Constitutional: Negative for chills, fever and weight loss.   HENT: Negative for congestion, sinus pain and sore throat.    Eyes: Negative for pain and discharge.   Respiratory: Positive for cough and sputum production. Negative for hemoptysis, shortness of breath, wheezing and stridor.    Cardiovascular: Positive for orthopnea. Negative for chest pain and leg swelling.   Gastrointestinal: Negative for abdominal pain, diarrhea, nausea and vomiting.   Genitourinary: Negative for dysuria, frequency and urgency.   Musculoskeletal: Negative for myalgias.   Skin: Negative for rash.   Neurological: Negative for dizziness, sensory change, focal weakness, loss of consciousness and headaches.   Psychiatric/Behavioral: Negative.    All other systems reviewed and are negative.      Vitals:  /62 (BP Location: Left arm, Patient Position: Sitting, BP Cuff Size: Adult)   Pulse 77   Ht 1.721 m (5' 7.75\")   Wt 61.2 kg (135 lb)   SpO2 98%     Physical Exam:  Physical Exam  Vitals signs and nursing note reviewed.   Constitutional:       General: She is not in acute distress.     Appearance: Normal " appearance. She is well-developed. She is not ill-appearing or diaphoretic.   Eyes:      General: No scleral icterus.        Right eye: No discharge.         Left eye: No discharge.      Conjunctiva/sclera: Conjunctivae normal.      Pupils: Pupils are equal, round, and reactive to light.   Neck:      Thyroid: No thyromegaly.      Vascular: No JVD.   Cardiovascular:      Rate and Rhythm: Normal rate and regular rhythm.      Heart sounds: Normal heart sounds. No murmur. No gallop.    Pulmonary:      Effort: Pulmonary effort is normal. No respiratory distress.      Breath sounds: Normal breath sounds. No wheezing or rales.   Abdominal:      General: There is no distension.      Palpations: Abdomen is soft.      Tenderness: There is no abdominal tenderness. There is no guarding.   Musculoskeletal:         General: No tenderness.   Lymphadenopathy:      Cervical: No cervical adenopathy.   Skin:     General: Skin is warm.      Capillary Refill: Capillary refill takes less than 2 seconds.      Findings: No erythema or rash.   Neurological:      Mental Status: She is alert and oriented to person, place, and time.      Cranial Nerves: No cranial nerve deficit.      Sensory: No sensory deficit.      Motor: No abnormal muscle tone.   Psychiatric:         Behavior: Behavior normal.       Laboratory Data:    PFTs as reviewed by me personally show:  PFTs today suggestive of obstruction with air trapping with no bronchodilator response. FEV1 has declined from 2.58 to 2.10L since 2012.    Imaging as reviewed by me personally show:  none    Assessment/Plan:    Problem List Items Addressed This Visit     Asthma-COPD overlap syndrome (HCC)     Fixed borderline obstruction on PFTs  Start symbicort  Continue singlaur  CXR 2v  Return in 6 weeks         Relevant Medications    montelukast (SINGULAIR) 10 MG Tab    cetirizine (KLS ALLER-DANYELLE) 10 MG Tab    budesonide-formoterol (SYMBICORT) 160-4.5 MCG/ACT Aerosol    Gastroesophageal reflux  disease     HOB elevation  Consider PPI next visit         Chronic sinusitis     Sinus hygiene with saline rinse followed by Flonase BID              Return in about 4 weeks (around 3/25/2020) for 4-6 weeks.     This note was generated using voice recognition software which has a chance of producing errors of grammar and possibly content.  I have made every reasonable attempt to find and correct any obvious errors, but it should be expected that some may not be found prior to finalization of this note.  __________  Tanner Cruz MD  Pulmonary and Critical Care Medicine  [facility]

## 2020-02-27 NOTE — ASSESSMENT & PLAN NOTE
Fixed borderline obstruction on PFTs  Start symbicort  Continue singlaur  CXR 2v  Return in 6 weeks

## 2020-02-28 ENCOUNTER — HOSPITAL ENCOUNTER (OUTPATIENT)
Dept: RADIOLOGY | Facility: MEDICAL CENTER | Age: 65
End: 2020-02-28
Attending: INTERNAL MEDICINE
Payer: COMMERCIAL

## 2020-02-28 PROCEDURE — 71046 X-RAY EXAM CHEST 2 VIEWS: CPT

## 2020-03-03 ENCOUNTER — TELEPHONE (OUTPATIENT)
Dept: PULMONOLOGY | Facility: HOSPICE | Age: 65
End: 2020-03-03

## 2020-03-03 NOTE — TELEPHONE ENCOUNTER
pt would like to come in sooner to discuss Symbicort, pt states she is unable to sleep and has been jumpy since starting Symbicort and has only been using it for 5 days. Sooner appointment has been scheduled.

## 2020-03-06 DIAGNOSIS — J43.9 PULMONARY EMPHYSEMA, UNSPECIFIED EMPHYSEMA TYPE (HCC): ICD-10-CM

## 2020-03-06 RX ORDER — BUDESONIDE AND FORMOTEROL FUMARATE DIHYDRATE 160; 4.5 UG/1; UG/1
2 AEROSOL RESPIRATORY (INHALATION) 2 TIMES DAILY
Qty: 3 INHALER | Refills: 3 | Status: SHIPPED | OUTPATIENT
Start: 2020-03-06 | End: 2020-05-21 | Stop reason: SDUPTHER

## 2020-03-06 NOTE — TELEPHONE ENCOUNTER
Have we ever prescribed this med? Yes.  If yes, what date? 02/26/2020    Last OV: 02/26/2020 - Dr. Cruz    Next OV: 03/23/2020 - Dr. Cruz    DX: Pulmonary Emphysema     Medications: Symbicort

## 2020-03-20 ENCOUNTER — HOSPITAL ENCOUNTER (OUTPATIENT)
Dept: RADIOLOGY | Facility: MEDICAL CENTER | Age: 65
End: 2020-03-20
Attending: INTERNAL MEDICINE
Payer: COMMERCIAL

## 2020-03-20 DIAGNOSIS — M85.80: ICD-10-CM

## 2020-03-20 DIAGNOSIS — Z12.31 VISIT FOR SCREENING MAMMOGRAM: ICD-10-CM

## 2020-03-20 PROCEDURE — 77080 DXA BONE DENSITY AXIAL: CPT

## 2020-03-20 PROCEDURE — 77067 SCR MAMMO BI INCL CAD: CPT

## 2020-03-23 ENCOUNTER — OFFICE VISIT (OUTPATIENT)
Dept: PULMONOLOGY | Facility: HOSPICE | Age: 65
End: 2020-03-23
Payer: COMMERCIAL

## 2020-03-23 VITALS
HEART RATE: 82 BPM | BODY MASS INDEX: 20.76 KG/M2 | SYSTOLIC BLOOD PRESSURE: 100 MMHG | OXYGEN SATURATION: 95 % | HEIGHT: 68 IN | DIASTOLIC BLOOD PRESSURE: 64 MMHG | WEIGHT: 137 LBS

## 2020-03-23 DIAGNOSIS — J44.89 ASTHMA-COPD OVERLAP SYNDROME (HCC): ICD-10-CM

## 2020-03-23 DIAGNOSIS — J32.9 CHRONIC SINUSITIS, UNSPECIFIED LOCATION: ICD-10-CM

## 2020-03-23 DIAGNOSIS — K21.9 GASTROESOPHAGEAL REFLUX DISEASE, ESOPHAGITIS PRESENCE NOT SPECIFIED: ICD-10-CM

## 2020-03-23 PROCEDURE — 99214 OFFICE O/P EST MOD 30 MIN: CPT | Performed by: INTERNAL MEDICINE

## 2020-03-23 ASSESSMENT — ENCOUNTER SYMPTOMS
LOSS OF CONSCIOUSNESS: 0
HEMOPTYSIS: 0
ABDOMINAL PAIN: 0
ORTHOPNEA: 1
FEVER: 0
NAUSEA: 0
WEIGHT LOSS: 0
SPUTUM PRODUCTION: 1
HEADACHES: 0
SENSORY CHANGE: 0
CHILLS: 0
VOMITING: 0
DIARRHEA: 0
PSYCHIATRIC NEGATIVE: 1
EYE DISCHARGE: 0
FOCAL WEAKNESS: 0
WHEEZING: 0
DIZZINESS: 0
STRIDOR: 0
SHORTNESS OF BREATH: 0
SORE THROAT: 0
SINUS PAIN: 0
MYALGIAS: 0
COUGH: 1
EYE PAIN: 0

## 2020-03-23 NOTE — ASSESSMENT & PLAN NOTE
Fixed borderline obstruction on PFTs  CXR normal  Cont symbicort 160, plan to reduce once cough is resolved  Continue singlair, cetirizine  Advised to start NeilMed/Flonase regularly  Shift dinnertime to earlier in the evening  Return to clinic in 2 months

## 2020-03-23 NOTE — PROGRESS NOTES
Pulmonary Clinic Note    Chief Complaint:  Chief Complaint   Patient presents with   • Follow-Up     Pulmonary emphysema. Last seen 02/26/2020   • Results     CXR 02/28/2020   • Medication Management     Symbicort     HPI:   Anaya Browne is a very pleasant 64 y.o. female former tobacco smoker 15 pack years, quit 2012 who is followed by Pradip Alegria D.O. and is referred to the pulmonary clinic for dyspnea.      Subjectively, Korin reports a cough since 11/2019, rarely productive of clear phlegm, never blood. It is particularly prevalent in the mornings and after meals. She is not limited during exercise. No cough at night but does endorse orthopnea. She has been taking zyrtec and singulair without improvement. She is reluctant to start too many new medications.     History of paroxysmal atrial fibrillation, previously on flecainide recently discontinued, patient declined anticoagulation, on aspirin 325 p.o. daily, followed closely by  [facility].    PFTs   2012: mild obstruction, no bronchodilator response, normal diffusion capacity and lung volumes.   2/2020: obstruction with air trapping with no bronchodilator response. FEV1 declined from 2.58 to 2.10L.    Interval events since initial consult on 2/26/2020  Treated for asthma/COPD overlap syndrome, started Symbicort, continue Singulair, cetirizine    Two-view chest x-ray personally reviewed, no significant abnormalities- large lung volumes but no flattening of the diaphragm    She is maintaining head of bed elevation due to AM cough  Not using sinus hygiene/Flonase consistently    Today she reports the cough is markedly improved, now intermittent, still nonproductive.  No fevers      Past Medical History:   Diagnosis Date   • Atrial fibrillation (HCC)    • Atrial flutter (HCC)    • Back pain    • Chickenpox    • Cough    • Daytime sleepiness    • Depression    • Diabetes (HCC)    • Hoarseness, persistent    • Influenza    • Mumps    •  Shortness of breath    • Skin change    • Sputum production    • Sweat, sweating, excessive    • Wears glasses        Past Surgical History:   Procedure Laterality Date   • HYSTERECTOMY LAPAROSCOPY     • HYSTERECTOMY LAPAROSCOPY         Social History     Socioeconomic History   • Marital status:      Spouse name: Not on file   • Number of children: Not on file   • Years of education: Not on file   • Highest education level: Not on file   Occupational History   • Not on file   Social Needs   • Financial resource strain: Not on file   • Food insecurity     Worry: Not on file     Inability: Not on file   • Transportation needs     Medical: Not on file     Non-medical: Not on file   Tobacco Use   • Smoking status: Former Smoker     Packs/day: 0.50     Years: 30.00     Pack years: 15.00     Types: Cigarettes     Last attempt to quit: 2012     Years since quittin.1   • Smokeless tobacco: Never Used   Substance and Sexual Activity   • Alcohol use: Yes     Alcohol/week: 1.5 oz     Types: 3 Glasses of wine per week   • Drug use: Yes     Frequency: 2.0 times per week     Comment: twice a week   • Sexual activity: Not on file   Lifestyle   • Physical activity     Days per week: Not on file     Minutes per session: Not on file   • Stress: Not on file   Relationships   • Social connections     Talks on phone: Not on file     Gets together: Not on file     Attends Gnosticist service: Not on file     Active member of club or organization: Not on file     Attends meetings of clubs or organizations: Not on file     Relationship status: Not on file   • Intimate partner violence     Fear of current or ex partner: Not on file     Emotionally abused: Not on file     Physically abused: Not on file     Forced sexual activity: Not on file   Other Topics Concern   • Not on file   Social History Narrative   • Not on file          Family History   Problem Relation Age of Onset   • Heart Disease Mother    • Breast Cancer Mother     • Lung Cancer Father    • Dementia Brother         vascular       Current Outpatient Medications on File Prior to Visit   Medication Sig Dispense Refill   • budesonide-formoterol (SYMBICORT) 160-4.5 MCG/ACT Aerosol Inhale 2 Puffs by mouth 2 Times a Day. Use spacer. Rinse mouth after each use. 3 Inhaler 3   • montelukast (SINGULAIR) 10 MG Tab Take 1 Tab by mouth every bedtime.     • Multiple Minerals (CALCIUM/MAGNESIUM/ZINC PO) Take  by mouth every day.     • cetirizine (KLS ALLER-DANYELLE) 10 MG Tab Take 10 mg by mouth every day.     • aspirin 81 MG tablet TAKE 1 TABLET BY MOUTH EVERY DAY     • L-THEANINE  mg every day.     • magnesium oxide (MAG-OX) 400 MG Tab Take 400 mg by mouth every day.     • ALPRAZolam (XANAX) 0.5 MG Tab Take 0.25 mg by mouth at bedtime as needed.     • metformin (GLUCOPHAGE) 500 MG Tab Take 500 mg by mouth 2 times a day, with meals.     • [facility]S ASPIRIN LOW DOSE 81 MG EC tablet TAKE 1 TABLET BY MOUTH EVERY DAY 90 Tab 3   • rosuvastatin (CRESTOR) 5 MG Tab Take 1 Tab by mouth every evening. (Patient not taking: Reported on 3/23/2020) 90 Tab 3     No current facility-administered medications on file prior to visit.        Allergies: Patient has no known allergies.    ROS:   Review of Systems   Constitutional: Negative for chills, fever and weight loss.   HENT: Negative for congestion, sinus pain and sore throat.    Eyes: Negative for pain and discharge.   Respiratory: Positive for cough and sputum production. Negative for hemoptysis, shortness of breath, wheezing and stridor.    Cardiovascular: Positive for orthopnea. Negative for chest pain and leg swelling.   Gastrointestinal: Negative for abdominal pain, diarrhea, nausea and vomiting.   Genitourinary: Negative for dysuria, frequency and urgency.   Musculoskeletal: Negative for myalgias.   Skin: Negative for rash.   Neurological: Negative for dizziness, sensory change, focal weakness, loss of consciousness and headaches.  "  Psychiatric/Behavioral: Negative.    All other systems reviewed and are negative.    Vitals:  /64 (BP Location: Left arm, Patient Position: Sitting, BP Cuff Size: Adult)   Pulse 82   Ht 1.721 m (5' 7.75\")   Wt 62.1 kg (137 lb)   SpO2 95%     Physical Exam:  Physical Exam  Vitals signs and nursing note reviewed.   Constitutional:       General: She is not in acute distress.     Appearance: Normal appearance. She is well-developed and normal weight. She is not ill-appearing or diaphoretic.   HENT:      Head: Normocephalic.      Nose: Nose normal.   Eyes:      General: No scleral icterus.        Right eye: No discharge.         Left eye: No discharge.      Conjunctiva/sclera: Conjunctivae normal.      Pupils: Pupils are equal, round, and reactive to light.   Neck:      Thyroid: No thyromegaly.      Vascular: No JVD.   Cardiovascular:      Rate and Rhythm: Normal rate and regular rhythm.      Heart sounds: Normal heart sounds. No murmur. No gallop.    Pulmonary:      Effort: Pulmonary effort is normal. No respiratory distress.      Breath sounds: Normal breath sounds. No wheezing or rales.   Musculoskeletal:         General: No tenderness.   Lymphadenopathy:      Cervical: No cervical adenopathy.   Skin:     Findings: No erythema or rash.   Neurological:      Mental Status: She is alert and oriented to person, place, and time. Mental status is at baseline.      Motor: No abnormal muscle tone.   Psychiatric:         Mood and Affect: Mood normal.         Behavior: Behavior normal.       Laboratory Data:    PFTs as reviewed by me personally show:  2012: mild obstruction, no bronchodilator response, normal diffusion capacity and lung volumes.   2/2020: obstruction with air trapping with no bronchodilator response. FEV1 declined from 2.58 to 2.10L.     Imaging as reviewed by me personally show:    Two-view chest x-ray personally reviewed, no significant abnormalities- large lung volumes but no flattening of the " diaphragm    Assessment/Plan:    Problem List Items Addressed This Visit     Asthma-COPD overlap syndrome (HCC)     Fixed borderline obstruction on PFTs  CXR normal  Cont symbicort 160, plan to reduce once cough is resolved  Continue singlair, cetirizine  Advised to start NeilMed/Flonase regularly  Shift dinnertime to earlier in the evening  Return to clinic in 2 months         Gastroesophageal reflux disease     Continue head of bed elevation  Shift dinnertime to at least 1-1/2 to 2 hours before bedtime.         Chronic sinusitis     Sinus hygiene with saline rinse followed by Flonase BID             Return in about 2 months (around 5/23/2020).     This note was generated using voice recognition software which has a chance of producing errors of grammar and possibly content.  I have made every reasonable attempt to find and correct any obvious errors, but it should be expected that some may not be found prior to finalization of this note.  __________  Tanner Cruz MD  Pulmonary and Critical Care Medicine  [facility]

## 2020-05-21 ENCOUNTER — OFFICE VISIT (OUTPATIENT)
Dept: PULMONOLOGY | Facility: HOSPICE | Age: 65
End: 2020-05-21
Payer: COMMERCIAL

## 2020-05-21 VITALS
SYSTOLIC BLOOD PRESSURE: 116 MMHG | HEIGHT: 68 IN | WEIGHT: 138.38 LBS | OXYGEN SATURATION: 97 % | DIASTOLIC BLOOD PRESSURE: 64 MMHG | BODY MASS INDEX: 20.97 KG/M2 | HEART RATE: 79 BPM

## 2020-05-21 DIAGNOSIS — K21.9 GASTROESOPHAGEAL REFLUX DISEASE, ESOPHAGITIS PRESENCE NOT SPECIFIED: ICD-10-CM

## 2020-05-21 DIAGNOSIS — J44.89 ASTHMA-COPD OVERLAP SYNDROME (HCC): ICD-10-CM

## 2020-05-21 DIAGNOSIS — J32.9 CHRONIC SINUSITIS, UNSPECIFIED LOCATION: ICD-10-CM

## 2020-05-21 DIAGNOSIS — J43.9 PULMONARY EMPHYSEMA, UNSPECIFIED EMPHYSEMA TYPE (HCC): ICD-10-CM

## 2020-05-21 PROCEDURE — 99214 OFFICE O/P EST MOD 30 MIN: CPT | Performed by: INTERNAL MEDICINE

## 2020-05-21 RX ORDER — BUDESONIDE AND FORMOTEROL FUMARATE DIHYDRATE 160; 4.5 UG/1; UG/1
2 AEROSOL RESPIRATORY (INHALATION) 2 TIMES DAILY
Qty: 3 INHALER | Refills: 6 | Status: SHIPPED | OUTPATIENT
Start: 2020-05-21 | End: 2020-10-13 | Stop reason: SDUPTHER

## 2020-05-21 RX ORDER — IPRATROPIUM BROMIDE AND ALBUTEROL 20; 100 UG/1; UG/1
1 SPRAY, METERED RESPIRATORY (INHALATION) EVERY 4 HOURS PRN
Qty: 1 INHALER | Refills: 6 | Status: SHIPPED | OUTPATIENT
Start: 2020-05-21 | End: 2020-10-07

## 2020-05-21 ASSESSMENT — ENCOUNTER SYMPTOMS
FOCAL WEAKNESS: 0
ORTHOPNEA: 0
WHEEZING: 0
WEIGHT LOSS: 0
MYALGIAS: 0
SPUTUM PRODUCTION: 1
EYE PAIN: 0
FEVER: 0
SINUS PAIN: 0
DIZZINESS: 0
SORE THROAT: 0
ABDOMINAL PAIN: 0
SHORTNESS OF BREATH: 0
EYE DISCHARGE: 0
NAUSEA: 0
CHILLS: 0
HEADACHES: 0
SENSORY CHANGE: 0
VOMITING: 0
STRIDOR: 0
COUGH: 1
DIARRHEA: 0
LOSS OF CONSCIOUSNESS: 0
HEMOPTYSIS: 0
PSYCHIATRIC NEGATIVE: 1

## 2020-05-21 NOTE — ASSESSMENT & PLAN NOTE
PFT 2/2020: fixed borderline obstruction  CXR normal  Symptomatically stable, mild dyspnea on exertion that is intermittent, continue to have AM cough  Cont symbicort 160, plan to reduce after springtime  Continue singlair, cetirizine  Cont to start NeilMed/Flonase regularly  Dinnertime to earlier in the evening  -- Start Combivent prior to exercise, and PRN every 4 hours  --Repeat PFTs 2/2021  --Return to clinic in 4 months

## 2020-05-21 NOTE — PROGRESS NOTES
Pulmonary Clinic Note    Chief Complaint:  Chief Complaint   Patient presents with   • Follow-Up     Asthma-COPD overlap syndrome. Last seen 03/23/2020     HPI:   Anaya Browne is a very pleasant 64 y.o. female former tobacco smoker 15 pack years, quit 2012 who is followed by Pradip Alegria D.O. and returns to the pulmonary clinic for follow-up of cough attributed to asthma COPD overlap syndrome.     She reports a morning cough since 11/2019 rarely productive of clear phlegm, never blood.  Cough is also present after meals.  She is not limited during exercise.  She denies any cough at night but does endorse some orthopnea. She tried zyrtec and singulair in the past without improvement. She is reluctant to start too many new medications.    PFTs in 2/2020 showed obstruction with air trapping with no bronchodilator response. FEV1 declined from 2.58 to 2.10L.  CXR 2/2020 no significant abnormalities- large lung volumes but no flattening of the diaphragm    Treated for asthma/COPD overlap syndrome, started Symbicort, continue Singulair, cetirizine     History of paroxysmal atrial fibrillation, previously on flecainide, patient declined anticoagulation, on aspirin 325 p.o. daily, followed closely by  [facility].    Interval events since last visit 3/2020:  Cough is significantly improved although still lingering, particularly in the morning and after her morning walks  She likes to walk trails in [facility], at times feels short of breath but other times does not  Compliant with Symbicort, Singulair, cetirizine  Doing sinus hygiene/Flonase inconsistently  Shifted dinnertime to earlier in the evening, be more diligent with sinus hygiene  Since last visit she underwent a DEXA scan which showed osteopenia.  She is not on a PPI for her GERD.      Past Medical History:   Diagnosis Date   • Atrial fibrillation (HCC)    • Atrial flutter (HCC)    • Back pain    • Chickenpox    • Cough    • Daytime sleepiness     • Depression    • Diabetes (HCC)    • Hoarseness, persistent    • Influenza    • Mumps    • Shortness of breath    • Skin change    • Sputum production    • Sweat, sweating, excessive    • Wears glasses        Past Surgical History:   Procedure Laterality Date   • HYSTERECTOMY LAPAROSCOPY     • HYSTERECTOMY LAPAROSCOPY         Social History     Socioeconomic History   • Marital status:      Spouse name: Not on file   • Number of children: Not on file   • Years of education: Not on file   • Highest education level: Not on file   Occupational History   • Not on file   Social Needs   • Financial resource strain: Not on file   • Food insecurity     Worry: Not on file     Inability: Not on file   • Transportation needs     Medical: Not on file     Non-medical: Not on file   Tobacco Use   • Smoking status: Former Smoker     Packs/day: 0.50     Years: 30.00     Pack years: 15.00     Types: Cigarettes     Last attempt to quit: 2012     Years since quittin.3   • Smokeless tobacco: Never Used   Substance and Sexual Activity   • Alcohol use: Yes     Alcohol/week: 1.5 oz     Types: 3 Glasses of wine per week   • Drug use: Yes     Frequency: 2.0 times per week     Comment: twice a week   • Sexual activity: Not on file   Lifestyle   • Physical activity     Days per week: Not on file     Minutes per session: Not on file   • Stress: Not on file   Relationships   • Social connections     Talks on phone: Not on file     Gets together: Not on file     Attends Tenriism service: Not on file     Active member of club or organization: Not on file     Attends meetings of clubs or organizations: Not on file     Relationship status: Not on file   • Intimate partner violence     Fear of current or ex partner: Not on file     Emotionally abused: Not on file     Physically abused: Not on file     Forced sexual activity: Not on file   Other Topics Concern   • Not on file   Social History Narrative   • Not on file           Family History   Problem Relation Age of Onset   • Heart Disease Mother    • Breast Cancer Mother    • Lung Cancer Father    • Dementia Brother         vascular       Current Outpatient Medications on File Prior to Visit   Medication Sig Dispense Refill   • montelukast (SINGULAIR) 10 MG Tab Take 1 Tab by mouth every bedtime.     • Multiple Minerals (CALCIUM/MAGNESIUM/ZINC PO) Take  by mouth every day.     • cetirizine (KLS ALLER-DANYELLE) 10 MG Tab Take 10 mg by mouth every day.     • aspirin 81 MG tablet TAKE 1 TABLET BY MOUTH EVERY DAY     • L-THEANINE  mg every day.     • magnesium oxide (MAG-OX) 400 MG Tab Take 400 mg by mouth every day.     • ALPRAZolam (XANAX) 0.5 MG Tab Take 0.25 mg by mouth at bedtime as needed.     • metformin (GLUCOPHAGE) 500 MG Tab Take 500 mg by mouth 2 times a day, with meals.     • [facility] ASPIRIN LOW DOSE 81 MG EC tablet TAKE 1 TABLET BY MOUTH EVERY DAY (Patient not taking: Reported on 5/21/2020) 90 Tab 3   • rosuvastatin (CRESTOR) 5 MG Tab Take 1 Tab by mouth every evening. (Patient not taking: Reported on 3/23/2020) 90 Tab 3     No current facility-administered medications on file prior to visit.        Allergies: Patient has no known allergies.    ROS:   Review of Systems   Constitutional: Negative for chills, fever and weight loss.   HENT: Negative for congestion, sinus pain and sore throat.    Eyes: Negative for pain and discharge.   Respiratory: Positive for cough and sputum production. Negative for hemoptysis, shortness of breath, wheezing and stridor.    Cardiovascular: Negative for chest pain, orthopnea and leg swelling.   Gastrointestinal: Negative for abdominal pain, diarrhea, nausea and vomiting.   Genitourinary: Negative for dysuria, frequency and urgency.   Musculoskeletal: Negative for myalgias.   Skin: Negative for rash.   Neurological: Negative for dizziness, sensory change, focal weakness, loss of consciousness and headaches.   Psychiatric/Behavioral: Negative.   "  All other systems reviewed and are negative.    Vitals:  /64 (BP Location: Left arm, Patient Position: Sitting, BP Cuff Size: Adult)   Pulse 79   Ht 1.721 m (5' 7.75\")   Wt 62.8 kg (138 lb 6 oz)   SpO2 97%     Physical Exam:  Physical Exam  Vitals signs and nursing note reviewed.   Constitutional:       General: She is not in acute distress.     Appearance: Normal appearance. She is well-developed and normal weight. She is not ill-appearing or diaphoretic.   HENT:      Head: Normocephalic.      Nose: Nose normal.   Eyes:      General: No scleral icterus.        Right eye: No discharge.         Left eye: No discharge.      Conjunctiva/sclera: Conjunctivae normal.      Pupils: Pupils are equal, round, and reactive to light.   Neck:      Thyroid: No thyromegaly.      Vascular: No JVD.   Cardiovascular:      Rate and Rhythm: Normal rate and regular rhythm.      Heart sounds: Normal heart sounds. No murmur. No gallop.    Pulmonary:      Effort: Pulmonary effort is normal. No respiratory distress.      Breath sounds: Normal breath sounds. No wheezing or rales.   Musculoskeletal:         General: No tenderness.   Lymphadenopathy:      Cervical: No cervical adenopathy.   Skin:     Findings: No erythema or rash.   Neurological:      Mental Status: She is alert and oriented to person, place, and time. Mental status is at baseline.      Motor: No abnormal muscle tone.   Psychiatric:         Mood and Affect: Mood normal.         Behavior: Behavior normal.       Laboratory Data:    PFTs as reviewed by me personally show:  2012: mild obstruction, no bronchodilator response, normal diffusion capacity and lung volumes.   2/2020: obstruction with air trapping with no bronchodilator response. FEV1 declined from 2.58 to 2.10L.     Imaging as reviewed by me personally show:    Two-view chest x-ray personally reviewed, no significant abnormalities- large lung volumes but no flattening of the " diaphragm    Assessment/Plan:    Problem List Items Addressed This Visit     Asthma-COPD overlap syndrome (HCC)     PFT 2/2020: fixed borderline obstruction  CXR normal  Symptomatically stable, mild dyspnea on exertion that is intermittent, continue to have AM cough  Cont symbicort 160, plan to reduce after springtime  Continue singlair, cetirizine  Cont to start NeilMed/Flonase regularly  Dinnertime to earlier in the evening  -- Start Combivent prior to exercise, and PRN every 4 hours  --Repeat PFTs 2/2021  --Return to clinic in 4 months         Relevant Medications    ipratropium-albuterol (COMBIVENT RESPIMAT)  MCG/ACT Aero Soln    budesonide-formoterol (SYMBICORT) 160-4.5 MCG/ACT Aerosol    Gastroesophageal reflux disease     Continue head of bed elevation  Shift dinnertime to at least 1-1/2 to 2 hours before bedtime.  No PPI in the setting of osteopenia noted on DEXA scan         Chronic sinusitis     Inconsistently doing sinus hygiene with saline rinse followed by Flonase BID, encouraged to do sinus rinses after her morning walks           Other Visit Diagnoses     Pulmonary emphysema, unspecified emphysema type (HCC)        Relevant Medications    ipratropium-albuterol (COMBIVENT RESPIMAT)  MCG/ACT Aero Soln    budesonide-formoterol (SYMBICORT) 160-4.5 MCG/ACT Aerosol        Return in about 4 months (around 9/21/2020).     This note was generated using voice recognition software which has a chance of producing errors of grammar and possibly content.  I have made every reasonable attempt to find and correct any obvious errors, but it should be expected that some may not be found prior to finalization of this note.  __________  Tanner Cruz MD  Pulmonary and Critical Care Medicine  [facility]

## 2020-05-21 NOTE — ASSESSMENT & PLAN NOTE
Inconsistently doing sinus hygiene with saline rinse followed by Flonase BID, encouraged to do sinus rinses after her morning walks

## 2020-05-21 NOTE — ASSESSMENT & PLAN NOTE
Continue head of bed elevation  Shift dinnertime to at least 1-1/2 to 2 hours before bedtime.  No PPI in the setting of osteopenia noted on DEXA scan

## 2020-06-24 ENCOUNTER — TELEPHONE (OUTPATIENT)
Dept: PULMONOLOGY | Facility: HOSPICE | Age: 65
End: 2020-06-24

## 2020-06-24 NOTE — TELEPHONE ENCOUNTER
MEDICATION PRIOR AUTHORIZATION NEEDED:    1. Name of Medication: Budesonide-Formoterol 160/4.5 mcg    2. Requested By (Name of [facility]): [facility]     3. Is insurance on file current? yes    4. What is the name & phone number of the 3rd party payor? [facility] [phone]

## 2020-06-29 NOTE — TELEPHONE ENCOUNTER
DOCUMENTATION OF PRIOR AUTH STATUS    1. Medication name and dose: Budesonide-Formoterol 160/4.5 mcg    2. Name and Phone # of Prescription coverage company: [employer]    3. Date Prior Auth was submitted: 6/23/2020    4. What information was given to obtain insurance decision: Clinical notes    5. Prior Auth letter Approved or Denied: Denied    6. Pharmacy notified: No    7. Patient notified: No      Budesonide-Formoterol 160/4.5 mcg was denied.  The brand name Symbicort is covered.  I will let the pharmacy know.

## 2020-09-09 ENCOUNTER — OFFICE VISIT (OUTPATIENT)
Dept: PULMONOLOGY | Facility: HOSPICE | Age: 65
End: 2020-09-09
Payer: COMMERCIAL

## 2020-09-09 VITALS
HEART RATE: 75 BPM | OXYGEN SATURATION: 96 % | BODY MASS INDEX: 21.14 KG/M2 | DIASTOLIC BLOOD PRESSURE: 76 MMHG | HEIGHT: 68 IN | SYSTOLIC BLOOD PRESSURE: 110 MMHG | WEIGHT: 139.5 LBS

## 2020-09-09 DIAGNOSIS — K21.9 GASTROESOPHAGEAL REFLUX DISEASE, ESOPHAGITIS PRESENCE NOT SPECIFIED: ICD-10-CM

## 2020-09-09 DIAGNOSIS — J44.89 ASTHMA-COPD OVERLAP SYNDROME (HCC): ICD-10-CM

## 2020-09-09 DIAGNOSIS — J32.9 CHRONIC SINUSITIS, UNSPECIFIED LOCATION: ICD-10-CM

## 2020-09-09 DIAGNOSIS — R25.1 TREMOR OF BOTH HANDS: ICD-10-CM

## 2020-09-09 DIAGNOSIS — R05.3 CHRONIC COUGH: ICD-10-CM

## 2020-09-09 PROCEDURE — 99214 OFFICE O/P EST MOD 30 MIN: CPT | Performed by: INTERNAL MEDICINE

## 2020-09-09 ASSESSMENT — ENCOUNTER SYMPTOMS
VOMITING: 0
SORE THROAT: 0
SENSORY CHANGE: 0
HEMOPTYSIS: 0
DIARRHEA: 0
LOSS OF CONSCIOUSNESS: 0
TREMORS: 1
MYALGIAS: 0
SHORTNESS OF BREATH: 0
FOCAL WEAKNESS: 0
EYE PAIN: 0
COUGH: 1
DIZZINESS: 0
FEVER: 0
SINUS PAIN: 0
HEADACHES: 0
WEIGHT LOSS: 0
NAUSEA: 0
ABDOMINAL PAIN: 0
PSYCHIATRIC NEGATIVE: 1
ORTHOPNEA: 0
EYE DISCHARGE: 0
STRIDOR: 0
CHILLS: 0
SPUTUM PRODUCTION: 1
WHEEZING: 0

## 2020-09-09 NOTE — ASSESSMENT & PLAN NOTE
Continue head of bed elevation, elimination diet, etc  PCP started pepcid  See further discussion above  osteopenia on DEXA scan noted

## 2020-09-09 NOTE — ASSESSMENT & PLAN NOTE
PFT 2/2020: fixed borderline obstruction  CXR normal  No dyspnea but continued cough  New UE tremor    Plan:  - dc Symbicort and monitor for change/improvement in tremor  - Plan repeat PFTs 2/2021  --Return to clinic in 1 month to f/u tremors, resp symptoms

## 2020-09-09 NOTE — ASSESSMENT & PLAN NOTE
- new  - no pill-rolling, bradykinesia, dysarthria  - dc symbicort and monitor resolution, if persistent refer to neurology

## 2020-09-09 NOTE — PROGRESS NOTES
"Pulmonary Clinic Note    Chief Complaint:  Chief Complaint   Patient presents with   • Follow-Up     Asthma/COPD overlap syndrome. Last seen 05/21/2020   • Cough     Right after taking Symbicort and after meals   • Tremors     Pt thinks it maybe from the inhalers     HPI:   Anaya rBowne is a very pleasant 64 y.o. female former tobacco smoker 15 pack years, quit 2012 who is followed by Pradip Alegria D.O. and returns to the pulmonary clinic for follow-up of cough attributed to asthma COPD overlap syndrome.     She reports a morning cough since 11/2019 rarely productive of clear phlegm, never blood.  Cough is also present after meals.  She is not limited during exercise.  She denies any cough at night but does endorse some orthopnea. She tried zyrtec and singulair in the past without improvement. She is reluctant to start too many new medications.    PFTs in 2/2020 showed obstruction with air trapping with no bronchodilator response. FEV1 declined from 2.58 to 2.10L.  CXR 2/2020 no significant abnormalities- large lung volumes but no flattening of the diaphragm    Treated for asthma/COPD overlap syndrome, started Symbicort, continue Singulair, cetirizine     History of paroxysmal atrial fibrillation, previously on flecainide, patient declined anticoagulation, on aspirin 325 p.o. daily, followed closely by  [facility].    Interval events since last visit 5/2020:  Continues to feel coughing after waking up as well as after meals, also noticed that her cough is improved on a vacation recently in which she did not bring her cat. Minimally productive, no change in quality/consistency    New symptoms include intermittent sporadic tremors of the bilateral hands, left greater than right; concerned it may be related to her Symbicort use. She has been compliant with Singulair, cetirizine, Pepcid, sinus hygiene    Otherwise states she \"feels great\", and that her symptoms are overall better with exercise, " walking the trails in [facility] and hiking frequently without limitation.      Past Medical History:   Diagnosis Date   • Atrial fibrillation (HCC)    • Atrial flutter (HCC)    • Back pain    • Chickenpox    • Cough    • Daytime sleepiness    • Depression    • Diabetes (HCC)    • Hoarseness, persistent    • Influenza    • Mumps    • Shortness of breath    • Skin change    • Sputum production    • Sweat, sweating, excessive    • Wears glasses        Past Surgical History:   Procedure Laterality Date   • HYSTERECTOMY LAPAROSCOPY     • HYSTERECTOMY LAPAROSCOPY         Social History     Socioeconomic History   • Marital status:      Spouse name: Not on file   • Number of children: Not on file   • Years of education: Not on file   • Highest education level: Not on file   Occupational History   • Not on file   Social Needs   • Financial resource strain: Not on file   • Food insecurity     Worry: Not on file     Inability: Not on file   • Transportation needs     Medical: Not on file     Non-medical: Not on file   Tobacco Use   • Smoking status: Former Smoker     Packs/day: 0.50     Years: 30.00     Pack years: 15.00     Types: Cigarettes     Quit date: 2012     Years since quittin.6   • Smokeless tobacco: Never Used   Substance and Sexual Activity   • Alcohol use: Yes     Alcohol/week: 1.5 oz     Types: 3 Glasses of wine per week   • Drug use: Yes     Frequency: 2.0 times per week     Comment: twice a week   • Sexual activity: Not on file   Lifestyle   • Physical activity     Days per week: Not on file     Minutes per session: Not on file   • Stress: Not on file   Relationships   • Social connections     Talks on phone: Not on file     Gets together: Not on file     Attends Judaism service: Not on file     Active member of club or organization: Not on file     Attends meetings of clubs or organizations: Not on file     Relationship status: Not on file   • Intimate partner violence     Fear of  current or ex partner: Not on file     Emotionally abused: Not on file     Physically abused: Not on file     Forced sexual activity: Not on file   Other Topics Concern   • Not on file   Social History Narrative   • Not on file          Family History   Problem Relation Age of Onset   • Heart Disease Mother    • Breast Cancer Mother    • Lung Cancer Father    • Dementia Brother         vascular       Current Outpatient Medications on File Prior to Visit   Medication Sig Dispense Refill   • ipratropium-albuterol (COMBIVENT RESPIMAT)  MCG/ACT Aero Soln Inhale 1 Puff by mouth every four hours as needed (shortness of breath/wheezing.). Maximum 6 puffs every 24 hours. 1 Inhaler 6   • budesonide-formoterol (SYMBICORT) 160-4.5 MCG/ACT Aerosol Inhale 2 Puffs by mouth 2 Times a Day. Use spacer. Rinse mouth after each use. 3 Inhaler 6   • montelukast (SINGULAIR) 10 MG Tab Take 1 Tab by mouth every bedtime.     • Multiple Minerals (CALCIUM/MAGNESIUM/ZINC PO) Take  by mouth every day.     • cetirizine (KLS ALLER-DANYELLE) 10 MG Tab Take 10 mg by mouth every day.     • aspirin 81 MG tablet TAKE 1 TABLET BY MOUTH EVERY DAY     • L-THEANINE  mg every day.     • magnesium oxide (MAG-OX) 400 MG Tab Take 400 mg by mouth every day.     • ALPRAZolam (XANAX) 0.5 MG Tab Take 0.25 mg by mouth at bedtime as needed.     • metformin (GLUCOPHAGE) 500 MG Tab Take 500 mg by mouth 2 times a day, with meals.       No current facility-administered medications on file prior to visit.        Allergies: Patient has no known allergies.    ROS:   Review of Systems   Constitutional: Negative for chills, fever and weight loss.   HENT: Negative for congestion, sinus pain and sore throat.    Eyes: Negative for pain and discharge.   Respiratory: Positive for cough and sputum production. Negative for hemoptysis, shortness of breath, wheezing and stridor.    Cardiovascular: Negative for chest pain, orthopnea and leg swelling.   Gastrointestinal:  "Negative for abdominal pain, diarrhea, nausea and vomiting.   Genitourinary: Negative for dysuria, frequency and urgency.   Musculoskeletal: Negative for myalgias.   Skin: Negative for rash.   Neurological: Positive for tremors (L > R hand). Negative for dizziness, sensory change, focal weakness, loss of consciousness and headaches.   Psychiatric/Behavioral: Negative.    All other systems reviewed and are negative.    Vitals:  /76 (BP Location: Left arm, Patient Position: Sitting, BP Cuff Size: Adult)   Pulse 75   Ht 1.721 m (5' 7.75\")   Wt 63.3 kg (139 lb 8 oz)   SpO2 96%     Physical Exam:  Physical Exam  Vitals signs and nursing note reviewed.   Constitutional:       General: She is not in acute distress.     Appearance: Normal appearance. She is well-developed and normal weight. She is not ill-appearing or diaphoretic.   HENT:      Head: Normocephalic.      Nose: Nose normal.   Eyes:      General: No scleral icterus.        Right eye: No discharge.         Left eye: No discharge.      Conjunctiva/sclera: Conjunctivae normal.      Pupils: Pupils are equal, round, and reactive to light.   Neck:      Thyroid: No thyromegaly.      Vascular: No JVD.   Cardiovascular:      Rate and Rhythm: Normal rate and regular rhythm.      Heart sounds: Normal heart sounds. No murmur. No gallop.    Pulmonary:      Effort: Pulmonary effort is normal. No respiratory distress.      Breath sounds: Normal breath sounds. No wheezing or rales.   Musculoskeletal:         General: No tenderness.   Lymphadenopathy:      Cervical: No cervical adenopathy.   Skin:     Findings: No erythema or rash.   Neurological:      Mental Status: She is alert and oriented to person, place, and time. Mental status is at baseline.      Motor: No abnormal muscle tone.      Comments: No tremor appreciated on exam today  No pill-rolling or bradykinesia  Speech fluent     Psychiatric:         Mood and Affect: Mood normal.         Behavior: Behavior " normal.       Laboratory Data:    PFTs as reviewed by me personally show:  2012: mild obstruction, no bronchodilator response, normal diffusion capacity and lung volumes.   2/2020: obstruction with air trapping with no bronchodilator response. FEV1 declined from 2.58 to 2.10L.     Imaging as reviewed by me personally show:    Two-view chest x-ray personally reviewed, no significant abnormalities- large lung volumes but no flattening of the diaphragm    Assessment/Plan:    Problem List Items Addressed This Visit     Chronic cough     -No improvement with sinus hygiene, asthma inhalers  -cough sporadic, worse in the morning when waking up and after meals; pt wants to hold on GI referral for now, discussed reflux symptoms/signs, elimination diet  -Of note she did notice improvement in the cough on when on vacation away from her cat  -continue pepcid, sinuglair, cetirizine, sinus hygiene         Tremor of both hands     - new  - no pill-rolling, bradykinesia, dysarthria  - dc symbicort and monitor resolution, if persistent refer to neurology         Asthma-COPD overlap syndrome (HCC)     PFT 2/2020: fixed borderline obstruction  CXR normal  No dyspnea but continued cough  New UE tremor    Plan:  - dc Symbicort and monitor for change/improvement in tremor  - Plan repeat PFTs 2/2021  --Return to clinic in 1 month to f/u tremors, resp symptoms         Gastroesophageal reflux disease     Continue head of bed elevation, elimination diet, etc  PCP started pepcid  See further discussion above  osteopenia on DEXA scan noted         Chronic sinusitis     - cont sinus hygeine             Return in about 1 month (around 10/9/2020).     This note was generated using voice recognition software which has a chance of producing errors of grammar and possibly content.  I have made every reasonable attempt to find and correct any obvious errors, but it should be expected that some may not be found prior to finalization of this  note.  __________  Tanner Cruz MD  Pulmonary and Critical Care Medicine  [facility]

## 2020-09-09 NOTE — ASSESSMENT & PLAN NOTE
-No improvement with sinus hygiene, asthma inhalers  -cough sporadic, worse in the morning when waking up and after meals; pt wants to hold on GI referral for now, discussed reflux symptoms/signs, elimination diet  -Of note she did notice improvement in the cough on when on vacation away from her cat  -continue pepcid, sinuglair, cetirizine, sinus hygiene

## 2020-10-07 ENCOUNTER — OFFICE VISIT (OUTPATIENT)
Dept: PULMONOLOGY | Facility: HOSPICE | Age: 65
End: 2020-10-07
Payer: COMMERCIAL

## 2020-10-07 VITALS
HEIGHT: 68 IN | SYSTOLIC BLOOD PRESSURE: 104 MMHG | BODY MASS INDEX: 20.93 KG/M2 | HEART RATE: 79 BPM | WEIGHT: 138.13 LBS | DIASTOLIC BLOOD PRESSURE: 68 MMHG | OXYGEN SATURATION: 97 %

## 2020-10-07 DIAGNOSIS — K21.9 GASTROESOPHAGEAL REFLUX DISEASE, UNSPECIFIED WHETHER ESOPHAGITIS PRESENT: ICD-10-CM

## 2020-10-07 DIAGNOSIS — R05.3 CHRONIC COUGH: ICD-10-CM

## 2020-10-07 DIAGNOSIS — J32.9 CHRONIC SINUSITIS, UNSPECIFIED LOCATION: ICD-10-CM

## 2020-10-07 DIAGNOSIS — R25.1 TREMOR OF BOTH HANDS: ICD-10-CM

## 2020-10-07 DIAGNOSIS — J44.89 ASTHMA-COPD OVERLAP SYNDROME (HCC): ICD-10-CM

## 2020-10-07 PROCEDURE — 99214 OFFICE O/P EST MOD 30 MIN: CPT | Performed by: INTERNAL MEDICINE

## 2020-10-07 ASSESSMENT — ENCOUNTER SYMPTOMS
SORE THROAT: 0
EYE PAIN: 0
HEMOPTYSIS: 0
HEADACHES: 0
CHILLS: 0
ABDOMINAL PAIN: 0
ORTHOPNEA: 0
SINUS PAIN: 0
SPUTUM PRODUCTION: 1
LOSS OF CONSCIOUSNESS: 0
WHEEZING: 0
DIZZINESS: 0
VOMITING: 0
TREMORS: 1
FOCAL WEAKNESS: 0
DIARRHEA: 0
PSYCHIATRIC NEGATIVE: 1
MYALGIAS: 0
SENSORY CHANGE: 0
EYE DISCHARGE: 0
WEIGHT LOSS: 0
COUGH: 1
SHORTNESS OF BREATH: 0
FEVER: 0
STRIDOR: 0
NAUSEA: 0

## 2020-10-07 NOTE — PROGRESS NOTES
Pulmonary Clinic Note    Chief Complaint:  Chief Complaint   Patient presents with   • Asthma     Last seen 09/09/20   • Cough   • Medication Management     Pt was off inhaler for 1 month due to tremors.  But still having tremors     HPI:   Anaya Browne is a very pleasant 65 y.o. female former tobacco smoker 15 pack years, quit 2012 who returns to the pulmonary clinic for follow-up of cough attributed to asthma/COPD overlap syndrome.     Morning cough since 11/2019. Rarely productive of clear phlegm, never blood.  Cough present after meals.  She is not limited during exercise.  She denies any cough at night but does endorse some orthopnea. She tried zyrtec and singulair in the past without improvement. She is reluctant to start too many new medications.  She recently noticed that the cough is improved when on vacation, which she did not bring her cat.    PFT 2/2020: obstruction with air trapping with no bronchodilator response. FEV1 declined from 2.58 to 2.10L.  CXR 2/2020: no significant abnormalities- large lung volumes but no flattening of the diaphragm    Treated for asthma/COPD overlap syndrome, started Symbicort, continue Singulair, cetirizine     History of paroxysmal atrial fibrillation, previously on flecainide, patient declined anticoagulation, on aspirin 325 p.o. daily, followed closely by  To cardiology clinic.    Interval events since last visit 9/2020:  No change in cough on last visit despite diligent sinus hygeine and inhaler use. Still sporadic, worst in morning. Trialed off Symbicort to see if it stopped the tremors; it didn't, and she became more easily short of breath during her exercises. She has not restarted but has continued pepcid, singulair, cetirizine, sinus hygeine    Past Medical History:   Diagnosis Date   • Atrial fibrillation (HCC)    • Atrial flutter (HCC)    • Back pain    • Chickenpox    • Cough    • Daytime sleepiness    • Depression    • Diabetes (HCC)    • Hoarseness,  persistent    • Influenza    • Mumps    • Shortness of breath    • Skin change    • Sputum production    • Sweat, sweating, excessive    • Wears glasses        Past Surgical History:   Procedure Laterality Date   • HYSTERECTOMY LAPAROSCOPY     • HYSTERECTOMY LAPAROSCOPY         Social History     Socioeconomic History   • Marital status:      Spouse name: Not on file   • Number of children: Not on file   • Years of education: Not on file   • Highest education level: Not on file   Occupational History   • Not on file   Social Needs   • Financial resource strain: Not on file   • Food insecurity     Worry: Not on file     Inability: Not on file   • Transportation needs     Medical: Not on file     Non-medical: Not on file   Tobacco Use   • Smoking status: Former Smoker     Packs/day: 0.50     Years: 30.00     Pack years: 15.00     Types: Cigarettes     Quit date: 2012     Years since quittin.6   • Smokeless tobacco: Never Used   Substance and Sexual Activity   • Alcohol use: Yes     Alcohol/week: 1.5 oz     Types: 3 Glasses of wine per week   • Drug use: Yes     Frequency: 2.0 times per week     Comment: twice a week   • Sexual activity: Not on file   Lifestyle   • Physical activity     Days per week: Not on file     Minutes per session: Not on file   • Stress: Not on file   Relationships   • Social connections     Talks on phone: Not on file     Gets together: Not on file     Attends Muslim service: Not on file     Active member of club or organization: Not on file     Attends meetings of clubs or organizations: Not on file     Relationship status: Not on file   • Intimate partner violence     Fear of current or ex partner: Not on file     Emotionally abused: Not on file     Physically abused: Not on file     Forced sexual activity: Not on file   Other Topics Concern   • Not on file   Social History Narrative   • Not on file          Family History   Problem Relation Age of Onset   • Heart Disease  Mother    • Breast Cancer Mother    • Lung Cancer Father    • Dementia Brother         vascular       Current Outpatient Medications on File Prior to Visit   Medication Sig Dispense Refill   • montelukast (SINGULAIR) 10 MG Tab Take 1 Tab by mouth every bedtime.     • Multiple Minerals (CALCIUM/MAGNESIUM/ZINC PO) Take  by mouth every day.     • cetirizine (KLS ALLER-DANYELLE) 10 MG Tab Take 10 mg by mouth every day.     • aspirin 81 MG tablet TAKE 1 TABLET BY MOUTH EVERY DAY     • L-THEANINE  mg every day.     • magnesium oxide (MAG-OX) 400 MG Tab Take 400 mg by mouth every day.     • ALPRAZolam (XANAX) 0.5 MG Tab Take 0.25 mg by mouth at bedtime as needed.     • metformin (GLUCOPHAGE) 500 MG Tab Take 500 mg by mouth 2 times a day, with meals.     • budesonide-formoterol (SYMBICORT) 160-4.5 MCG/ACT Aerosol Inhale 2 Puffs by mouth 2 Times a Day. Use spacer. Rinse mouth after each use. (Patient not taking: Reported on 10/7/2020) 3 Inhaler 6     No current facility-administered medications on file prior to visit.        Allergies: Patient has no known allergies.    ROS:   Review of Systems   Constitutional: Negative for chills, fever and weight loss.   HENT: Negative for congestion, sinus pain and sore throat.    Eyes: Negative for pain and discharge.   Respiratory: Positive for cough and sputum production. Negative for hemoptysis, shortness of breath, wheezing and stridor.    Cardiovascular: Negative for chest pain, orthopnea and leg swelling.   Gastrointestinal: Negative for abdominal pain, diarrhea, nausea and vomiting.   Genitourinary: Negative for dysuria, frequency and urgency.   Musculoskeletal: Negative for myalgias.   Skin: Negative for rash.   Neurological: Positive for tremors (L > R hand). Negative for dizziness, sensory change, focal weakness, loss of consciousness and headaches.   Psychiatric/Behavioral: Negative.    All other systems reviewed and are negative.    Vitals:  /68 (BP Location: Left  "arm, Patient Position: Sitting, BP Cuff Size: Adult)   Pulse 79   Ht 1.721 m (5' 7.75\")   Wt 62.7 kg (138 lb 2 oz)   SpO2 97%     Physical Exam:  Physical Exam  Vitals signs and nursing note reviewed.   Constitutional:       General: She is not in acute distress.     Appearance: Normal appearance. She is well-developed and normal weight. She is not ill-appearing or diaphoretic.   HENT:      Head: Normocephalic.      Nose: Nose normal.   Eyes:      General: No scleral icterus.        Right eye: No discharge.         Left eye: No discharge.      Conjunctiva/sclera: Conjunctivae normal.      Pupils: Pupils are equal, round, and reactive to light.   Neck:      Thyroid: No thyromegaly.      Vascular: No JVD.   Cardiovascular:      Rate and Rhythm: Normal rate and regular rhythm.      Heart sounds: Normal heart sounds. No murmur. No gallop.    Pulmonary:      Effort: Pulmonary effort is normal. No respiratory distress.      Breath sounds: Normal breath sounds. No wheezing or rales.   Musculoskeletal:         General: No tenderness.   Lymphadenopathy:      Cervical: No cervical adenopathy.   Skin:     Findings: No erythema or rash.   Neurological:      Mental Status: She is alert and oriented to person, place, and time. Mental status is at baseline.      Motor: No abnormal muscle tone.      Comments: No tremor appreciated on exam today  No pill-rolling or bradykinesia  Speech fluent     Psychiatric:         Mood and Affect: Mood normal.         Behavior: Behavior normal.       Laboratory Data:    PFTs as reviewed by me personally show:  2012: mild obstruction, no bronchodilator response, normal diffusion capacity and lung volumes.   2/2020: obstruction with air trapping with no bronchodilator response. FEV1 declined from 2.58 to 2.10L.     Imaging as reviewed by me personally show:    Two-view chest x-ray personally reviewed, no significant abnormalities- large lung volumes but no flattening of the " diaphragm    Assessment/Plan:    Problem List Items Addressed This Visit     Asthma-COPD overlap syndrome (HCC)     PFT 2/2020: fixed borderline obstruction, decline in FEV1 from 2.58 -> 2.10L  CXR normal  Dyspnea controlled with Symbicort, but continued refractory cough  Continued UE tremor     Plan:  - Trial Trelegy x 2 weeks, if no improvement will resume Symbicort  - PFTs and 2v CXR  - depending on results consider IgE panel, CBC with diff         Relevant Medications    Fluticasone-Umeclidin-Vilant (TRELEGY ELLIPTA) 100-62.5-25 MCG/INH AEROSOL POWDER, BREATH ACTIVATED    Other Relevant Orders    PULMONARY FUNCTION TESTS -Test requested: Complete Pulmonary Function Test    DX-CHEST-2 VIEWS    Chronic cough     -No improvement with sinus hygiene, inhalers  -cough sporadic, worse in the morning when waking up and after meals; declined GI referral  -Of note she did notice improvement in the cough on when on vacation away from her cat  -continue pepcid, sinuglair, cetirizine, sinus hygiene         Tremor of both hands     - new; no pill-rolling, bradykinesia, dysarthria  - no improvement with trial off Symbicort  - Neurology referral for suspected essential tremor         Relevant Orders    REFERRAL TO NEUROLOGY    Gastroesophageal reflux disease     Continue head of bed elevation, elimination diet, pepcid  See further discussion above  osteopenia on DEXA scan noted         Chronic sinusitis     Reports well controlled sinus congestion despite ongoing cough  Cont sinus hygeine             Return in about 3 months (around 1/7/2021).     This note was generated using voice recognition software which has a chance of producing errors of grammar and possibly content.  I have made every reasonable attempt to find and correct any obvious errors, but it should be expected that some may not be found prior to finalization of this note.  __________  Tanner Cruz MD  Pulmonary and Critical Care Medicine  [facility]

## 2020-10-08 NOTE — ASSESSMENT & PLAN NOTE
Continue head of bed elevation, elimination diet, pepcid  See further discussion above  osteopenia on DEXA scan noted

## 2020-10-08 NOTE — ASSESSMENT & PLAN NOTE
-No improvement with sinus hygiene, inhalers  -cough sporadic, worse in the morning when waking up and after meals; declined GI referral  -Of note she did notice improvement in the cough on when on vacation away from her cat  -continue pepcid, sinuglair, cetirizine, sinus hygiene

## 2020-10-08 NOTE — ASSESSMENT & PLAN NOTE
- new; no pill-rolling, bradykinesia, dysarthria  - no improvement with trial off Symbicort  - Neurology referral for suspected essential tremor

## 2020-10-08 NOTE — ASSESSMENT & PLAN NOTE
PFT 2/2020: fixed borderline obstruction, decline in FEV1 from 2.58 -> 2.10L  CXR normal  Dyspnea controlled with Symbicort, but continued refractory cough  Continued UE tremor     Plan:  - Trial Trelegy x 2 weeks, if no improvement will resume Symbicort  - PFTs and 2v CXR  - depending on results consider IgE panel, CBC with diff

## 2020-10-12 ENCOUNTER — HOSPITAL ENCOUNTER (OUTPATIENT)
Dept: RADIOLOGY | Facility: MEDICAL CENTER | Age: 65
End: 2020-10-12
Attending: INTERNAL MEDICINE
Payer: COMMERCIAL

## 2020-10-12 PROCEDURE — 71046 X-RAY EXAM CHEST 2 VIEWS: CPT

## 2020-10-16 ENCOUNTER — OFFICE VISIT (OUTPATIENT)
Dept: NEUROLOGY | Facility: MEDICAL CENTER | Age: 65
End: 2020-10-16
Payer: COMMERCIAL

## 2020-10-16 VITALS
HEIGHT: 67 IN | SYSTOLIC BLOOD PRESSURE: 120 MMHG | DIASTOLIC BLOOD PRESSURE: 70 MMHG | BODY MASS INDEX: 21.87 KG/M2 | HEART RATE: 77 BPM | WEIGHT: 139.33 LBS | OXYGEN SATURATION: 99 % | TEMPERATURE: 97.3 F

## 2020-10-16 DIAGNOSIS — R25.1 TREMOR OF BOTH HANDS: Primary | ICD-10-CM

## 2020-10-16 PROCEDURE — 99204 OFFICE O/P NEW MOD 45 MIN: CPT | Performed by: PSYCHIATRY & NEUROLOGY

## 2020-10-16 ASSESSMENT — PATIENT HEALTH QUESTIONNAIRE - PHQ9: CLINICAL INTERPRETATION OF PHQ2 SCORE: 0

## 2020-10-16 NOTE — PROGRESS NOTES
Subjective:      Korin Browne is a 65 y.o. female who presents from the office of Dr. Tanner Cruz MD, for consultation, with a history of tremor, initially in the hands, now more recently noticed in her head.     HPI    Korin is a pleasant 65-year-old right-handed woman who began to notice tremulousness in her hands, may be beginning 1 year ago, she thinks there may have been a slight progression since then.  Within the last week or so, even her  has mentioned a tremulousness of the head.    More on the right side, she is right-handed, the tremulousness is an action based process.  She describes a higher frequency but low amplitude shaking that occurs when she is using the extremity, it is made worse when she is more fatigued, anxious, or simply stressed out.  More relaxed, the hand sitting in her lap calmly, the tremor is gone.  The heavy of the object that she is carrying, the more prominent the tremor can be.  Fine motor control has become ever more curtailed.  Handwriting has gotten much sloppier.    The head tremulousness is something only her  had seen, she was never aware of it.  Her voice is never been involved, she also denies a loss of voice volume or quality.  There has never been an internalized sense of tremulousness.  Given Symbicort inhaler for her pulmonary condition, this made the tremulousness worse, but it never made it go away completely when she stopped using the drug.    She denies any history of loss of taste or smell, postural instability with frequent falls, difficulty with movements, postural instability, change in voice clarity, difficulty swallowing, drooling, etc.  She also denies any constipation, urinary retention or change in frequency, orthostatic dizziness or syncope, etc.    She has a history of borderline diabetes, and an arrhythmia in the distant past.  There is no history of asthma, CVA, CAD, PVD, hypertension, dyslipidemia, liver or kidney disease, thyroid  "disease, malignancy, anemia, blood dyscrasia, autoimmune disease, psychiatric disease, MS, seizure, or migraine.  There is no surgical history of note.    Her mother  at 74 with cancer, her father  at 82 with cancer and diabetes, she has 2 brothers, one of them actually has severe dementia and may in fact have tremor, something she has noted.  Both her sisters are alive and well.  She quit tobacco use years ago, drinks alcohol only occasionally, and she has not noted any change in the tremor when she does have a drink.  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  Her , Messi, also retired, is a former mining company executive.    She is on her Symbicort and Trelegy inhalers, Xanax 0.5 mg as needed, baby aspirin, Glucophage 500 mg, twice daily, Singulair and several supplements.    ROS       Objective:     /70 (BP Location: Right arm, Patient Position: Sitting, BP Cuff Size: Adult)   Pulse 77   Temp 36.3 °C (97.3 °F) (Temporal)   Ht 1.702 m (5' 7\")   Wt 63.2 kg (139 lb 5.3 oz)   SpO2 99%   BMI 21.82 kg/m²      Physical Exam    She appears in no acute distress, younger than her stated age.  She is quite cooperative.  Vital signs are stable.  Pulse is 77 regular, blood pressure 120/70.  There is no malar rash or sialorrhea.  The neck is supple, range of motion is full, carotid pulses are present bilaterally without asymmetry.  Cardiac evaluation reveals a regular rhythm.  There is no lower extremity edema.    Fully oriented, there is no evidence of focal cognitive or language deficits.    PERRLA/EOMI, visual fields are full on confrontation bilaterally, funduscopic exam reveals sharp disc margin on the right, I could not visualize the fundus on the left.  There is no bradykinesia or hypophonia, glabellar tap is absent.  Facial movements are symmetric, the tongue and uvula are midline without bulbar dysfunction, there is no tremulousness of the voice or head.  Shoulder shrug and head rotation are " intact and symmetric.    Musculoskeletal exam reveals only a fine tremulousness with the hands, right slightly more than left, mostly with sustained posture against gravity.  It attenuates completely at rest.  Tone is normal even with distraction and reinforcement.  There is no drift or asterixis, strength is 5/5 in all 4 extremities.  Reflexes are present throughout, diminished at the ankles, but there are no asymmetries and both toes are downgoing.    She stands easily, arm swing is symmetric, gait is normal and stride length and station.  There is no appendicular dystaxia, repetitive movements and fine motor control show normal amplitude and frequencies bilaterally, without asymmetry.  Heel, toe, and tandem walking are all normal.    Sensory exam is intact to vibration and temperature, Romberg is absent.     Assessment/Plan:     1. Tremor of both hands  This looks to be a simple, benign essential tremor, but if in fact her brother does suffer from tremor, we would simply change the label to a familial, benign tremor.  Still, the treatments are the same, the nature of the disorder as well.  In 30% of patients, alcohol shows consistent benefit, and this effect is almost pathognomonic for the disorder.  I thus recommended she have a drink on occasion.    This is not a marker of some neurodegenerative disease such as Parkinson's disease, she was also reassured she does not have the latter diagnosis, regardless.  There is no reason to image, though MS, stroke, mass, etc. could all be seen as a cause for her symptoms.  Because of the bilaterality, a drug could be considered, though she is on nothing that would do this.  It would also be unusual for neurodegenerative disease, ischemia, or mass to have bilateral structural pathology that would cause a tremor.  Thus, with an otherwise unremarkable neurologic examination, imaging is probably not required.    We spent some time talking about the condition itself.  The  tremulousness is minimal, she is learning to deal with it, she was told that she will have to learn to deal with it since we cannot cure it, only mitigated severity.  We will hold on treatment.  We can follow-up into the future as needed.    Time: 50 minutes spent face-to-face for exam, review, discussion, and education, of this over 60% of the time spent counseling and coordinating care.

## 2020-12-21 ENCOUNTER — NON-PROVIDER VISIT (OUTPATIENT)
Dept: SLEEP MEDICINE | Facility: MEDICAL CENTER | Age: 65
End: 2020-12-21
Attending: INTERNAL MEDICINE
Payer: COMMERCIAL

## 2020-12-21 VITALS — HEIGHT: 68 IN | WEIGHT: 138 LBS | BODY MASS INDEX: 20.92 KG/M2

## 2020-12-21 PROCEDURE — 94060 EVALUATION OF WHEEZING: CPT | Performed by: INTERNAL MEDICINE

## 2020-12-21 PROCEDURE — 94729 DIFFUSING CAPACITY: CPT | Performed by: INTERNAL MEDICINE

## 2020-12-21 PROCEDURE — 94726 PLETHYSMOGRAPHY LUNG VOLUMES: CPT | Performed by: INTERNAL MEDICINE

## 2020-12-21 ASSESSMENT — PULMONARY FUNCTION TESTS
FEV1_PERCENT_PREDICTED: 86
FEV1/FVC: 71
FEV1_LLN: 2.25
FEV1/FVC: 70
FEV1_PERCENT_PREDICTED: 85
FEV1/FVC_PERCENT_PREDICTED: 90
FEV1/FVC_PERCENT_PREDICTED: 89
FVC_PERCENT_PREDICTED: 94
FVC: 3.29
FEV1_PREDICTED: 2.69
FVC: 3.27
FEV1/FVC: 70.95
FEV1: 2.3
FEV1/FVC_PERCENT_PREDICTED: 91
FVC_PERCENT_PREDICTED: 95
FEV1: 2.32
FEV1/FVC_PERCENT_PREDICTED: 89
FEV1_PERCENT_CHANGE: 0
FEV1_PERCENT_CHANGE: 0
FEV1/FVC_PERCENT_CHANGE: 1
FEV1/FVC: 70
FEV1/FVC_PERCENT_PREDICTED: 78
FVC_PREDICTED: 3.46
FEV1/FVC_PREDICTED: 78
FEV1/FVC_PERCENT_LLN: 65
FVC_LLN: 2.89

## 2020-12-21 NOTE — PROCEDURES
Tech: Sandi Garcia, RRT, CPFT  Tech notes: Good patient effort & cooperation.  The results of this test meet the ATS/ERS standards for acceptability & reproducibility.  Test was performed on the So[employer] Body Plethysmograph-Elite DX system.  Predicted values were GLI-2012 for spirometry, GLI- 2017 for DLCO, ITS for Lung Volumes.  The DLCO was uncorrected for Hgb.  A bronchodilator of Ventolin HFA -2puffs via spacer administered.  DLCO performed during dilation period.    Interpretation:  1.  Baseline spirometry shows borderline airflow obstruction with FEV1/FVC ratio of 70 and FEV1 of 2.3 L or 85% predicted.  2.  There is no significant bronchodilator response.  3.  Lung volumes are within normal limits.  There is no significant air trapping.  4.  Diffusion capacity is within normal limits at 112% predicted.  Pulmonary function testing shows borderline airflow obstruction that is present in mid airflows with no significant bronchodilator response, normal lung volumes and normal diffusion capacity.  This may be consistent with reactive airways disease.  Suggest clinical correlation.

## 2021-02-11 ENCOUNTER — TELEPHONE (OUTPATIENT)
Dept: SCHEDULING | Facility: IMAGING CENTER | Age: 66
End: 2021-02-11

## 2021-02-23 ASSESSMENT — ENCOUNTER SYMPTOMS
COUGH: 1
ABDOMINAL PAIN: 0
FOCAL WEAKNESS: 0
ORTHOPNEA: 0
VOMITING: 0
WHEEZING: 0
SINUS PAIN: 0
SHORTNESS OF BREATH: 0
CHILLS: 0
SENSORY CHANGE: 0
DIARRHEA: 0
MYALGIAS: 0
LOSS OF CONSCIOUSNESS: 0
EYE PAIN: 0
STRIDOR: 0
FEVER: 0
EYE DISCHARGE: 0
SORE THROAT: 0
SPUTUM PRODUCTION: 1
TREMORS: 1
DIZZINESS: 0
PSYCHIATRIC NEGATIVE: 1
HEADACHES: 0
WEIGHT LOSS: 0
HEMOPTYSIS: 0
NAUSEA: 0

## 2021-02-24 ENCOUNTER — OFFICE VISIT (OUTPATIENT)
Dept: SLEEP MEDICINE | Facility: MEDICAL CENTER | Age: 66
End: 2021-02-24
Payer: COMMERCIAL

## 2021-02-24 VITALS
WEIGHT: 137.19 LBS | HEART RATE: 74 BPM | HEIGHT: 68 IN | SYSTOLIC BLOOD PRESSURE: 108 MMHG | OXYGEN SATURATION: 98 % | BODY MASS INDEX: 20.79 KG/M2 | DIASTOLIC BLOOD PRESSURE: 76 MMHG

## 2021-02-24 DIAGNOSIS — K21.00 GASTROESOPHAGEAL REFLUX DISEASE WITH ESOPHAGITIS WITHOUT HEMORRHAGE: ICD-10-CM

## 2021-02-24 DIAGNOSIS — J32.8 OTHER CHRONIC SINUSITIS: ICD-10-CM

## 2021-02-24 DIAGNOSIS — J44.89 ASTHMA-COPD OVERLAP SYNDROME (HCC): ICD-10-CM

## 2021-02-24 DIAGNOSIS — G25.0 BENIGN ESSENTIAL TREMOR: ICD-10-CM

## 2021-02-24 DIAGNOSIS — J44.9 CHRONIC OBSTRUCTIVE PULMONARY DISEASE, UNSPECIFIED COPD TYPE (HCC): ICD-10-CM

## 2021-02-24 DIAGNOSIS — R05.3 CHRONIC COUGH: ICD-10-CM

## 2021-02-24 PROCEDURE — 99214 OFFICE O/P EST MOD 30 MIN: CPT | Performed by: INTERNAL MEDICINE

## 2021-02-24 NOTE — PROGRESS NOTES
Pulmonary Clinic Note    Chief Complaint:  Chief Complaint   Patient presents with   • Follow-Up     Asthma-COPD overlap syndrome. Last seen 10/07/20   • Results     PFT 12/21/20, CXR 10/12/20, Neur. consult: 10/16/20     HPI:   Anaya Browne is a very pleasant 65 y.o. female former tobacco smoker 15 pack years, quit 2012 who returns to the pulmonary clinic for follow-up of cough attributed to asthma/COPD overlap syndrome.     Morning cough since 11/2019. Rarely productive of clear phlegm, never blood.  Cough present after meals.  She is not limited during exercise.  She denies any cough at night but does endorse some orthopnea. She tried zyrtec and singulair in the past without improvement. She is reluctant to start too many new medications.  She recently noticed that the cough is improved when on vacation, which she did not bring her cat.    PFT 3/2020: borderline obstruction with no bronchodilator response. FEV1 2.10L -> 2.30L  CXR 2/2020: no significant abnormalities- large lung volumes but no flattening of the diaphragm    Treated for asthma/COPD overlap syndrome, started Symbicort, continue Singulair, cetirizine     History of paroxysmal atrial fibrillation, previously on flecainide, patient declined anticoagulation, on aspirin 325 p.o. daily, followed closely by  To cardiology clinic.    Interval events since last visit 10/2020:  Persistent AM cough, becoming more productive of clear phlegm.  Has been refractory to diligent sinus hygiene  PFTs have shown stable borderline obstruction, however no improvement in cough with multiple trials of various inhalers  Cough is also been refractory to Singulair, H2 blockers  Denies any reflux/GERD-like symptoms however does report a cough sometimes worse after meals.  No benefit with trial of Pepcid.    Was seen by neurology for tremor, told it is benign, advised to have an occasional drink.  No evidence of underlying neurodegenerative disease.    Past Medical  History:   Diagnosis Date   • Atrial fibrillation (HCC)    • Atrial flutter (HCC)    • Back pain    • Chickenpox    • Cough    • Daytime sleepiness    • Depression    • Diabetes (HCC)    • Hoarseness, persistent    • Influenza    • Mumps    • Shortness of breath    • Skin change    • Sputum production    • Sweat, sweating, excessive    • Wears glasses        Past Surgical History:   Procedure Laterality Date   • HYSTERECTOMY LAPAROSCOPY     • HYSTERECTOMY LAPAROSCOPY         Social History     Socioeconomic History   • Marital status:      Spouse name: Not on file   • Number of children: Not on file   • Years of education: Not on file   • Highest education level: Not on file   Occupational History   • Not on file   Tobacco Use   • Smoking status: Former Smoker     Packs/day: 0.50     Years: 30.00     Pack years: 15.00     Types: Cigarettes     Quit date: 2012     Years since quittin.0   • Smokeless tobacco: Never Used   Substance and Sexual Activity   • Alcohol use: Yes     Alcohol/week: 1.5 oz     Types: 3 Glasses of wine per week   • Drug use: Yes     Frequency: 2.0 times per week     Comment: twice a week   • Sexual activity: Not on file   Other Topics Concern   • Not on file   Social History Narrative   • Not on file     Social Determinants of Health     Financial Resource Strain:    • Difficulty of Paying Living Expenses:    Food Insecurity:    • Worried About Running Out of Food in the Last Year:    • Ran Out of Food in the Last Year:    Transportation Needs:    • Lack of Transportation (Medical):    • Lack of Transportation (Non-Medical):    Physical Activity:    • Days of Exercise per Week:    • Minutes of Exercise per Session:    Stress:    • Feeling of Stress :    Social Connections:    • Frequency of Communication with Friends and Family:    • Frequency of Social Gatherings with Friends and Family:    • Attends [employer] Services:    • Active Member of Clubs or Organizations:    • Attends  Club or Organization Meetings:    • Marital Status:    Intimate Partner Violence:    • Fear of Current or Ex-Partner:    • Emotionally Abused:    • Physically Abused:    • Sexually Abused:           Family History   Problem Relation Age of Onset   • Heart Disease Mother    • Breast Cancer Mother    • Lung Cancer Father    • Dementia Brother         vascular       Current Outpatient Medications on File Prior to Visit   Medication Sig Dispense Refill   • budesonide-formoterol (SYMBICORT) 160-4.5 MCG/ACT Aerosol Inhale 2 Puffs by mouth 2 Times a Day. Use spacer. Rinse mouth after each use. Provide SPACER 1 Each 5   • montelukast (SINGULAIR) 10 MG Tab Take 1 Tab by mouth every bedtime.     • Multiple Minerals (CALCIUM/MAGNESIUM/ZINC PO) Take  by mouth every day.     • cetirizine (KLS ALLER-DANYELLE) 10 MG Tab Take 10 mg by mouth every day.     • aspirin 81 MG tablet TAKE 1 TABLET BY MOUTH EVERY DAY     • L-THEANINE  mg every day.     • magnesium oxide (MAG-OX) 400 MG Tab Take 400 mg by mouth every day.     • ALPRAZolam (XANAX) 0.5 MG Tab Take 0.25 mg by mouth at bedtime as needed.     • metformin (GLUCOPHAGE) 500 MG Tab Take 500 mg by mouth 2 times a day, with meals.     • Fluticasone-Umeclidin-Vilant (TRELEGY ELLIPTA) 100-62.5-25 MCG/INH AEROSOL POWDER, BREATH ACTIVATED Inhale 1 Puff by mouth every day. (Patient not taking: Reported on 10/16/2020) 1 Each 0     No current facility-administered medications on file prior to visit.       Allergies: Patient has no known allergies.    ROS:   Review of Systems   Constitutional: Negative for chills, fever and weight loss.   HENT: Negative for congestion, sinus pain and sore throat.    Eyes: Negative for pain and discharge.   Respiratory: Positive for cough and sputum production. Negative for hemoptysis, shortness of breath, wheezing and stridor.    Cardiovascular: Negative for chest pain, orthopnea and leg swelling.   Gastrointestinal: Negative for abdominal pain, diarrhea,  "nausea and vomiting.   Genitourinary: Negative for dysuria, frequency and urgency.   Musculoskeletal: Negative for myalgias.   Skin: Negative for rash.   Neurological: Positive for tremors (L > R hand). Negative for dizziness, sensory change, focal weakness, loss of consciousness and headaches.   Psychiatric/Behavioral: Negative.    All other systems reviewed and are negative.    Vitals:  /76 (BP Location: Right arm, Patient Position: Sitting, BP Cuff Size: Adult)   Pulse 74   Ht 1.721 m (5' 7.75\")   Wt 62.2 kg (137 lb 3 oz)   SpO2 98%     Physical Exam:  Physical Exam  Vitals and nursing note reviewed.   Constitutional:       General: She is not in acute distress.     Appearance: Normal appearance. She is well-developed and normal weight. She is not ill-appearing or diaphoretic.   HENT:      Head: Normocephalic.      Nose: Nose normal.   Eyes:      General: No scleral icterus.        Right eye: No discharge.         Left eye: No discharge.      Conjunctiva/sclera: Conjunctivae normal.      Pupils: Pupils are equal, round, and reactive to light.   Neck:      Thyroid: No thyromegaly.      Vascular: No JVD.   Cardiovascular:      Rate and Rhythm: Normal rate and regular rhythm.      Heart sounds: Normal heart sounds. No murmur. No gallop.    Pulmonary:      Effort: Pulmonary effort is normal. No respiratory distress.      Breath sounds: Normal breath sounds. No wheezing or rales.   Musculoskeletal:         General: No tenderness.   Lymphadenopathy:      Cervical: No cervical adenopathy.   Skin:     Findings: No erythema or rash.   Neurological:      Mental Status: She is alert and oriented to person, place, and time. Mental status is at baseline.      Motor: No abnormal muscle tone.      Comments: No tremor appreciated on exam today  No pill-rolling or bradykinesia  Speech fluent     Psychiatric:         Mood and Affect: Mood normal.         Behavior: Behavior normal.       Laboratory Data:    PFTs as " reviewed by me personally show:  2012: mild obstruction, no bronchodilator response, normal diffusion capacity and lung volumes.   2/2020: obstruction with air trapping with no bronchodilator response. FEV1 declined from 2.58 to 2.10L.  12/2020 showed borderline obstruction with ratio 70%, FEV1 2.3 L or 85% predicted.  No significant BD response.  Normal lung volumes.  Preserved diffusion capacity. FEV1 from 2.58 -> 2.10L -> 2.30L    Imaging as reviewed by me personally show:    CXR 10/2020 also was reassuring.    Assessment/Plan:    Problem List Items Addressed This Visit     Chronic cough     - No improvement with sinus hygiene, inhalers  - cough sporadic, worse in the morning when waking up and after meals; requesting GI referral  - continue pepcid, sinuglair, cetirizine, sinus hygiene         Relevant Orders    PULMONARY FUNCTION TESTS -Test requested: Complete Pulmonary Function Test    REFERRAL TO GASTROENTEROLOGY    Benign essential tremor     Seen by neurology in 2020  No parkinsonian symptoms         Asthma-COPD overlap syndrome (HCC)     PFT 12/2020: fixed borderline obstruction, FEV1 from 2.58 -> 2.10L -> 2.30L  CXR normal  Dyspnea controlled with Symbicort, but continued refractory cough     Plan:  - Main complaint currently is cough.  Minimal dyspnea.  Discussed continued inhalers, will continue as needed  - Repeat PFTs 12/2021         Gastroesophageal reflux disease     Continue head of bed elevation, elimination diet, pepcid  GI referral  osteopenia on DEXA scan noted         Chronic sinusitis     Reports well controlled sinus congestion despite ongoing cough  Cont sinus hygeine           Other Visit Diagnoses     Chronic obstructive pulmonary disease, unspecified COPD type (HCC)            Return in about 1 year (around 2/24/2022).     This note was generated using voice recognition software which has a chance of producing errors of grammar and possibly content.  I have made every reasonable attempt  to find and correct any obvious errors, but it should be expected that some may not be found prior to finalization of this note.  __________  Tanner Cruz MD  Pulmonary and Critical Care Medicine  Formerly [facility], later [facility]

## 2021-02-25 NOTE — ASSESSMENT & PLAN NOTE
Continue head of bed elevation, elimination diet, pepcid  GI referral  osteopenia on DEXA scan noted

## 2021-02-25 NOTE — ASSESSMENT & PLAN NOTE
- No improvement with sinus hygiene, inhalers  - cough sporadic, worse in the morning when waking up and after meals; requesting GI referral  - continue pepcid, sinuglair, cetirizine, sinus hygiene

## 2021-02-25 NOTE — ASSESSMENT & PLAN NOTE
PFT 12/2020: fixed borderline obstruction, FEV1 from 2.58 -> 2.10L -> 2.30L  CXR normal  Dyspnea controlled with Symbicort, but continued refractory cough     Plan:  - Main complaint currently is cough.  Minimal dyspnea.  Discussed continued inhalers, will continue as needed  - Repeat PFTs 12/2021

## 2021-03-03 ENCOUNTER — OFFICE VISIT (OUTPATIENT)
Dept: MEDICAL GROUP | Facility: MEDICAL CENTER | Age: 66
End: 2021-03-03
Payer: MEDICARE

## 2021-03-03 VITALS
DIASTOLIC BLOOD PRESSURE: 66 MMHG | HEIGHT: 68 IN | HEART RATE: 75 BPM | SYSTOLIC BLOOD PRESSURE: 100 MMHG | WEIGHT: 139 LBS | TEMPERATURE: 98.8 F | RESPIRATION RATE: 16 BRPM | OXYGEN SATURATION: 94 % | BODY MASS INDEX: 21.07 KG/M2

## 2021-03-03 DIAGNOSIS — R73.9 HYPERGLYCEMIA: ICD-10-CM

## 2021-03-03 DIAGNOSIS — R05.3 CHRONIC COUGH: ICD-10-CM

## 2021-03-03 DIAGNOSIS — I48.92 ATRIAL FLUTTER, UNSPECIFIED TYPE (HCC): ICD-10-CM

## 2021-03-03 DIAGNOSIS — R53.82 CHRONIC FATIGUE: ICD-10-CM

## 2021-03-03 PROBLEM — Z79.899 HIGH RISK MEDICATION USE: Status: RESOLVED | Noted: 2019-08-05 | Resolved: 2021-03-03

## 2021-03-03 PROCEDURE — 99204 OFFICE O/P NEW MOD 45 MIN: CPT | Performed by: FAMILY MEDICINE

## 2021-03-03 ASSESSMENT — PATIENT HEALTH QUESTIONNAIRE - PHQ9: CLINICAL INTERPRETATION OF PHQ2 SCORE: 0

## 2021-03-03 NOTE — ASSESSMENT & PLAN NOTE
The patient reports increasing fatigue over the past two months. She feels she needs to take a nap around 3PM every day. She sleeps well at night, no snoring. She sleeps 7.5-8 hours. She is exercising most days of the week for min 30min. No recent life changes or stressful events. Patient does not feel depressed. No increased hair loss, skin or nail changes. No constipation or cold intolerance. Thyroid WNL 6/2020 per review of outside labs. Patient denies fevers, chills, night sweats or unintentional weight loss.    She notes that singulair seems to make her tired.

## 2021-03-03 NOTE — ASSESSMENT & PLAN NOTE
History of paroxysmal atrial flutter.  Patient was previously following with Dr. Parker however patient did not feel this was a good fit and has not seen a cardiologist since 2019.  Patient denies any palpitations, lightheadedness, dizziness, chest pain, orthopnea, PND, or lower extremity edema.  She reports she used to feel when she was in a flutter and has not felt the sensation for over 2 years. She was previously on flecainide which she stopped due to anxiety and depression.  She is on ASA 81 mg daily, no other anticoagulation.

## 2021-03-03 NOTE — LETTER
[facility]  Carla Pacheco M.D.  [address]  [place] [address]  Fax: [phone]   Authorization for Release/Disclosure of   Protected Health Information   Name: ANAYA PACKER : 1955 SSN: [id number]   Address: [address] [place], [place] [address] [place] [address] Phone:    [phone] (home)    I authorize the entity listed below to release/disclose the PHI below to:   [facility]/Carla Pacheco M.D. and Carla Pacheco M.D.   Provider or Entity Name:[facility]     Address   City, [facility], [facility]   Phone:[phone]      Fax:[phone]     Reason for request: continuity of care   Information to be released:    [ xxx ] LAST COLONOSCOPY,  including any PATH REPORT and follow-up  [  ] LAST FIT/COLOGUARD RESULT [  ] LAST DEXA  [  ] LAST MAMMOGRAM  [  ] LAST PAP  [  ] LAST LABS [  ] RETINA EXAM REPORT  [  ] IMMUNIZATION RECORDS  [  ] Release all info      [  ] Check here and initial the line next to each item to release ALL health information INCLUDING  _____ Care and treatment for drug and / or alcohol abuse  _____ HIV testing, infection status, or AIDS  _____ Genetic Testing    DATES OF SERVICE OR TIME PERIOD TO BE DISCLOSED: _____________  I understand and acknowledge that:  * This Authorization may be revoked at any time by you in writing, except if your health information has already been used or disclosed.  * Your health information that will be used or disclosed as a result of you signing this authorization could be re-disclosed by the recipient. If this occurs, your re-disclosed health information may no longer be protected by State or Federal laws.  * You may refuse to sign this Authorization. Your refusal will not affect your ability to obtain treatment.  * This Authorization becomes effective upon signing and will  on (date) __________.      If no date is indicated, this Authorization will  one (1) year from the signature date.    Name: Anaya Wills  Pavan    Signature:Continuation of Care   Date:     3/3/2021       PLEASE FAX REQUESTED RECORDS BACK TO: [phone]

## 2021-03-03 NOTE — PROGRESS NOTES
Subjective:     CC:  Diagnoses of Chronic fatigue, Hyperglycemia, Chronic cough, and Atrial flutter, unspecified type (HCC) were pertinent to this visit.    HISTORY OF THE PRESENT ILLNESS: Patient is a 65 y.o. female. She is here today to establish care. She is retired, , no children, many nieces and nephews. She has a pap scheduled next week, mammogram scheduled 3/22/21, colonoscopy UTD. Patient defers vaccinations until she gets her COVID19 vaccinations.  Prior PCP: Patient has [facility] physician.    Chronic fatigue  The patient reports increasing fatigue over the past two months. She feels she needs to take a nap around 3PM every day. She sleeps well at night, no snoring. She sleeps 7.5-8 hours. She is exercising most days of the week for min 30min. No recent life changes or stressful events. Patient does not feel depressed. No increased hair loss, skin or nail changes. No constipation or cold intolerance. Thyroid WNL 6/2020 per review of outside labs. Patient denies fevers, chills, night sweats or unintentional weight loss.    She notes that singulair seems to make her tired.     Hyperglycemia  The patient tries to eat a whole foods diet although loves her bread and pasta. She exercises 30 min daily. HgbA1C 6.1 6/15/20 ([facility]). She reports compliance with metformin 500mg BID.    The patient did whole30 five years ago and lost 20 pounds, gained 10 pounds back.       Chronic cough  Patient reports chronic cough that started around 11/2019. Patient is being treated for Asthma-COPD overlap with Dr. Nacho Cruz.     Patient reports cough is worse in the morning, some clear mucus production, no hemoptysis. She quit smoking in 2012. No fevers, night sweats, unintentional weight loss.  CXR 10/12/2020 unremarkable. She has tried many different inhalers, H2 blockers, oral antihistamines, and Singulair.  Dr. Cruz has referred her to GI.         Atrial flutter  History of paroxysmal atrial flutter.  Patient was  previously following with Dr. Parker however patient did not feel this was a good fit and has not seen a cardiologist since 2019.  Patient denies any palpitations, lightheadedness, dizziness, chest pain, orthopnea, PND, or lower extremity edema.  She reports she used to feel when she was in a flutter and has not felt the sensation for over 2 years. She was previously on flecainide which she stopped due to anxiety and depression.  She is on ASA 81 mg daily, no other anticoagulation.      Allergies: Patient has no known allergies.    Current Outpatient Medications Ordered in Epic   Medication Sig Dispense Refill   • budesonide-formoterol (SYMBICORT) 160-4.5 MCG/ACT Aerosol Inhale 2 Puffs by mouth 2 Times a Day. Use spacer. Rinse mouth after each use. Provide SPACER 1 Each 5   • montelukast (SINGULAIR) 10 MG Tab Take 1 Tab by mouth every bedtime.     • Multiple Minerals (CALCIUM/MAGNESIUM/ZINC PO) Take  by mouth every day.     • cetirizine (KLS ALLER-DANYELLE) 10 MG Tab Take 10 mg by mouth every day.     • aspirin 81 MG tablet TAKE 1 TABLET BY MOUTH EVERY DAY     • L-THEANINE  mg every day.     • magnesium oxide (MAG-OX) 400 MG Tab Take 400 mg by mouth every day.     • ALPRAZolam (XANAX) 0.5 MG Tab Take 0.25 mg by mouth at bedtime as needed.     • metformin (GLUCOPHAGE) 500 MG Tab Take 500 mg by mouth 2 times a day, with meals.       No current Epic-ordered facility-administered medications on file.       Past Medical History:   Diagnosis Date   • Atrial fibrillation (HCC)    • Atrial flutter (HCC)    • Back pain    • Chickenpox    • Cough    • Daytime sleepiness    • Depression    • Diabetes (HCC)    • Hoarseness, persistent    • Influenza    • Mumps    • Shortness of breath    • Skin change    • Sputum production    • Sweat, sweating, excessive    • Wears glasses        Past Surgical History:   Procedure Laterality Date   • HYSTERECTOMY LAPAROSCOPY     • HYSTERECTOMY LAPAROSCOPY         Social History     Tobacco Use  "  • Smoking status: Former Smoker     Packs/day: 0.50     Years: 30.00     Pack years: 15.00     Types: Cigarettes     Quit date: 2012     Years since quittin.0   • Smokeless tobacco: Never Used   Substance Use Topics   • Alcohol use: Yes     Alcohol/week: 1.5 oz     Types: 3 Glasses of wine per week   • Drug use: Yes     Frequency: 2.0 times per week     Types: Marijuana     Comment: twice a week       Social History     Social History Narrative   • Not on file       Family History   Problem Relation Age of Onset   • Breast Cancer Mother         breast cancer 68   • Cancer Mother    • Lung Cancer Father    • Dementia Brother         vascular   • Stroke Brother    • Cancer Maternal Aunt         breast cancer       Health Maintenance: See above     ROS:   Gen: no fevers/chills, no changes in weight  Eyes: no changes in vision  ENT: no sore throat or nasal congestion  Pulm: no sob, no cough  CV: no chest pain  GI: no nausea/vomiting, no diarrhea or constipation  : no dysuria  MSk: no myalgias  Skin: no rash  Neuro: no headaches, no numbness/tingling  Immuno: no seasonal allergies  Heme/Lymph: no easy bruising  Psych: no anxiety of depression      Objective:     Exam: /66 (BP Location: Left arm, Patient Position: Sitting, BP Cuff Size: Adult)   Pulse 75   Temp 37.1 °C (98.8 °F) (Temporal)   Resp 16   Ht 1.721 m (5' 7.75\")   Wt 63 kg (139 lb)   SpO2 94%  Body mass index is 21.29 kg/m².    General: Well-developed, well-nourished. Appears stated age.  Eyes: Normocephalic. Conjunctiva clear without injection or scleral icterus. Pupils equal and reactive to light.   HENT: Normocephalic, atraumatic. Ears normal shape and contour, canals are clear bilaterally, tympanic membranes pearly.  Neck: Supple; no obvious thyromegaly or nodules appreciated  Pulmonary: Clear to ausculation bilaterally.  No increased work of breathing. No rales, ronchi, or wheezing.  Cardiovascular: Regular rate and rhythm without " murmur.  Abdomen: Soft, nontender, nondistended. Normal bowel sounds. No guarding or rebound tenderness.  Neurologic: CN III-XII intact.  Lymph: No cervical or supraclavicular lymphadenopathy palpated.  Skin: Warm and dry.  No obvious lesions.  Musculoskeletal: Normal gait. No extremity cyanosis, clubbing, or edema.  Psych: Euthymic affect. Alert and oriented x 3. Judgment and insight is normal.      Assessment & Plan:   65 y.o. female with the following -    1. Chronic fatigue  Fatigue X 2 months, no other red flag symptoms. Labs from [facility] reviewed from 6/2020, no anemia or hypothyroidism. Patient would like to wait on any repeat labs at this time. She is sleeping well, exercising daily, trying to eat healthy diet. Singulair does make her tired, she will discuss stopping this medication with her [facility] physician as she does not feel it has improved her allergies or chronic cough.    2. Hyperglycemia  This is a chronic problem.  She tries to eat a healthy diet, does low bread and pasta.  She reports compliance with metformin 500 mg twice daily. We will continue this regimen and repeat HgbA1C with her labs which will be ordered at her next visit with her other labs.     3. Chronic cough  Chronic cough since 11/2019, patient following with Dr. Nacho Cruz, has tried multiple treatments per HPI. She has been referred to GI. I also discussed allergy referral pending GI workup.    4. Atrial flutter, unspecified type (HCC)  Paroxysmal atrial flutter.  The patient was previously following with cardiology however has not had an appointment in about 2 years.  She is on aspirin 81 mg daily, no other anticoagulation.  We discussed her increased risk of stroke.  The patient is amenable to establishing with cardiology again, she would like to wait on the referral at this time. She is currently asymptomatic.    Return in about 3 months (around 6/3/2021).    Please note this dictation was created using voice recognition  software. I have made every reasonable attempt to correct obvious errors, but I expect there may be errors of grammar, and possibly content, that I did not discover before finalizing the note.

## 2021-03-03 NOTE — ASSESSMENT & PLAN NOTE
The patient tries to eat a whole foods diet although loves her bread and pasta. She exercises 30 min daily. HgbA1C 6.1 6/15/20 ([facility]). She reports compliance with metformin 500mg BID.    The patient did whole30 five years ago and lost 20 pounds, gained 10 pounds back.

## 2021-03-03 NOTE — ASSESSMENT & PLAN NOTE
Patient reports chronic cough that started around 11/2019. Patient is being treated for Asthma-COPD overlap with Dr. Nacho Cruz.     Patient reports cough is worse in the morning, some clear mucus production, no hemoptysis. She quit smoking in 2012. No fevers, night sweats, unintentional weight loss.  CXR 10/12/2020 unremarkable. She has tried many different inhalers, H2 blockers, oral antihistamines, and Singulair.  Dr. Cruz has referred her to GI.

## 2021-03-09 DIAGNOSIS — Z23 NEED FOR VACCINATION: ICD-10-CM

## 2021-03-11 ENCOUNTER — IMMUNIZATION (OUTPATIENT)
Dept: FAMILY PLANNING/WOMEN'S HEALTH CLINIC | Facility: IMMUNIZATION CENTER | Age: 66
End: 2021-03-11
Attending: INTERNAL MEDICINE
Payer: MEDICARE

## 2021-03-11 DIAGNOSIS — Z23 NEED FOR VACCINATION: ICD-10-CM

## 2021-03-11 DIAGNOSIS — Z23 ENCOUNTER FOR VACCINATION: Primary | ICD-10-CM

## 2021-03-11 PROCEDURE — 0001A PFIZER SARS-COV-2 VACCINE: CPT

## 2021-03-11 PROCEDURE — 91300 PFIZER SARS-COV-2 VACCINE: CPT

## 2021-03-22 ENCOUNTER — APPOINTMENT (OUTPATIENT)
Dept: RADIOLOGY | Facility: MEDICAL CENTER | Age: 66
End: 2021-03-22
Attending: INTERNAL MEDICINE
Payer: MEDICARE

## 2021-03-22 DIAGNOSIS — Z12.31 VISIT FOR SCREENING MAMMOGRAM: ICD-10-CM

## 2021-04-02 ENCOUNTER — IMMUNIZATION (OUTPATIENT)
Dept: FAMILY PLANNING/WOMEN'S HEALTH CLINIC | Facility: IMMUNIZATION CENTER | Age: 66
End: 2021-04-02
Attending: INTERNAL MEDICINE
Payer: MEDICARE

## 2021-04-02 DIAGNOSIS — Z23 ENCOUNTER FOR VACCINATION: Primary | ICD-10-CM

## 2021-04-02 PROCEDURE — 91300 PFIZER SARS-COV-2 VACCINE: CPT | Performed by: NURSE PRACTITIONER

## 2021-04-02 PROCEDURE — 0002A PFIZER SARS-COV-2 VACCINE: CPT | Performed by: NURSE PRACTITIONER

## 2021-05-13 ENCOUNTER — HOSPITAL ENCOUNTER (OUTPATIENT)
Dept: RADIOLOGY | Facility: MEDICAL CENTER | Age: 66
End: 2021-05-13
Attending: NURSE PRACTITIONER
Payer: MEDICARE

## 2021-05-13 DIAGNOSIS — R09.82 POST-NASAL DRIP: ICD-10-CM

## 2021-05-13 DIAGNOSIS — R05.9 COUGH: ICD-10-CM

## 2021-05-13 PROCEDURE — 74220 X-RAY XM ESOPHAGUS 1CNTRST: CPT

## 2021-05-24 ENCOUNTER — HOSPITAL ENCOUNTER (OUTPATIENT)
Dept: RADIOLOGY | Facility: MEDICAL CENTER | Age: 66
End: 2021-05-24
Attending: FAMILY MEDICINE
Payer: MEDICARE

## 2021-05-24 DIAGNOSIS — Z12.31 VISIT FOR SCREENING MAMMOGRAM: ICD-10-CM

## 2021-05-24 PROCEDURE — 77063 BREAST TOMOSYNTHESIS BI: CPT

## 2021-06-15 ENCOUNTER — OFFICE VISIT (OUTPATIENT)
Dept: MEDICAL GROUP | Facility: MEDICAL CENTER | Age: 66
End: 2021-06-15
Payer: MEDICARE

## 2021-06-15 VITALS
DIASTOLIC BLOOD PRESSURE: 72 MMHG | SYSTOLIC BLOOD PRESSURE: 128 MMHG | OXYGEN SATURATION: 97 % | HEIGHT: 68 IN | HEART RATE: 68 BPM | BODY MASS INDEX: 21.1 KG/M2 | RESPIRATION RATE: 14 BRPM | WEIGHT: 139.2 LBS | TEMPERATURE: 98.4 F

## 2021-06-15 DIAGNOSIS — R73.9 HYPERGLYCEMIA: ICD-10-CM

## 2021-06-15 DIAGNOSIS — E78.2 MIXED HYPERLIPIDEMIA: ICD-10-CM

## 2021-06-15 DIAGNOSIS — J44.89 ASTHMA-COPD OVERLAP SYNDROME (HCC): ICD-10-CM

## 2021-06-15 DIAGNOSIS — R05.3 CHRONIC COUGH: ICD-10-CM

## 2021-06-15 DIAGNOSIS — I48.92 ATRIAL FLUTTER, UNSPECIFIED TYPE (HCC): ICD-10-CM

## 2021-06-15 DIAGNOSIS — Z11.59 NEED FOR HEPATITIS C SCREENING TEST: ICD-10-CM

## 2021-06-15 PROBLEM — R53.82 CHRONIC FATIGUE: Status: RESOLVED | Noted: 2021-03-03 | Resolved: 2021-06-15

## 2021-06-15 PROCEDURE — 99214 OFFICE O/P EST MOD 30 MIN: CPT | Performed by: FAMILY MEDICINE

## 2021-06-15 NOTE — ASSESSMENT & PLAN NOTE
History: Patient reports chronic cough that started around 11/2019. Patient is being treated for Asthma-COPD overlap with Dr. Nacho Cruz.     Patient reports cough is worse in the morning, some clear mucus production, no hemoptysis. She quit smoking in 2012. No fevers, night sweats, unintentional weight loss.  CXR 10/12/2020 unremarkable. She has tried many different inhalers, H2 blockers, oral antihistamines, and Singulair.  Dr. Cruz has referred her to GI. Recent barium swallow demonstrated small hiatal hernia. Endoscopy scheduled with GI.

## 2021-06-15 NOTE — ASSESSMENT & PLAN NOTE
History of paroxysmal atrial flutter.  Patient was previously following with Dr. Parker however patient did not feel this was a good fit and has not seen a cardiologist since 2019.  She was on flecainide for a number of years however has been off for about 5 years.  Patient denies any palpitations, lightheadedness, dizziness, chest pain, orthopnea, PND. She has occasional mild LE edema at the end of the day which resolved with LE elevation. She reports she used to feel when she was in a flutter and has not felt the sensation for over 2 years. She is on ASA 81 mg daily, no other anticoagulation.

## 2021-06-15 NOTE — PROGRESS NOTES
Subjective:     CC: discuss chronic medical conditions per below    HPI:   Anaya Wills presents today with:    Hyperglycemia  Anaya eats a whole foods diet although does have bread and pasta occasionally. She exercises 30 min daily. HgbA1C 6.1 6/15/20 ([facility]). She reports compliance with metformin 500mg BID. She would like to eventually stop metformin and manage with lifestyle.        Chronic cough  History: Patient reports chronic cough that started around 11/2019. Patient is being treated for Asthma-COPD overlap with Dr. Nacho Cruz.     Patient reports cough is worse in the morning, some clear mucus production, no hemoptysis. She quit smoking in 2012. No fevers, night sweats, unintentional weight loss.  CXR 10/12/2020 unremarkable. She has tried many different inhalers, H2 blockers, oral antihistamines, and Singulair.  Dr. Cruz has referred her to GI. Recent barium swallow demonstrated small hiatal hernia. Endoscopy scheduled with GI.        Atrial flutter  History of paroxysmal atrial flutter.  Patient was previously following with Dr. Parker however patient did not feel this was a good fit and has not seen a cardiologist since 2019.  She was on flecainide for a number of years however has been off for about 5 years.  Patient denies any palpitations, lightheadedness, dizziness, chest pain, orthopnea, PND. She has occasional mild LE edema at the end of the day which resolved with LE elevation. She reports she used to feel when she was in a flutter and has not felt the sensation for over 2 years. She is on ASA 81 mg daily, no other anticoagulation.    Mixed hyperlipidemia  Patient had labs completed through [facility] 6/2020: T chol 204, TGs 87, HDL 67, . She was recommended to start statin therapy by her previous cardiologist, Dr. Parker, however Anaya would like to avoid statin therapy.      Past Medical History:   Diagnosis Date   • Atrial fibrillation (HCC)    • Atrial flutter (HCC)    • Back pain    •  "Chickenpox    • Cough    • Daytime sleepiness    • Depression    • Diabetes (HCC)    • Hoarseness, persistent    • Influenza    • Mumps    • Shortness of breath    • Skin change    • Sputum production    • Sweat, sweating, excessive    • Wears glasses        Social History     Tobacco Use   • Smoking status: Former Smoker     Packs/day: 0.50     Years: 30.00     Pack years: 15.00     Types: Cigarettes     Quit date: 2012     Years since quittin.3   • Smokeless tobacco: Never Used   Vaping Use   • Vaping Use: Never used   Substance Use Topics   • Alcohol use: Yes     Alcohol/week: 1.5 oz     Types: 3 Glasses of wine per week   • Drug use: Yes     Frequency: 2.0 times per week     Types: Marijuana     Comment: twice a week       Current Outpatient Medications Ordered in Epic   Medication Sig Dispense Refill   • montelukast (SINGULAIR) 10 MG Tab Take 1 Tab by mouth every bedtime.     • cetirizine (KLS ALLER-DANYELLE) 10 MG Tab Take 10 mg by mouth every day.     • aspirin 81 MG tablet TAKE 1 TABLET BY MOUTH EVERY DAY     • L-THEANINE  mg every day.     • magnesium oxide (MAG-OX) 400 MG Tab Take 400 mg by mouth every day.     • ALPRAZolam (XANAX) 0.5 MG Tab Take 0.25 mg by mouth at bedtime as needed.     • metformin (GLUCOPHAGE) 500 MG Tab Take 500 mg by mouth 2 times a day, with meals.       No current Epic-ordered facility-administered medications on file.       Allergies:  Patient has no known allergies.    Health Maintenance: Due for several vaccinations, patient will get at pharmacy; patient is not diabetic - not due for foot or retinal screening    ROS:  Gen: no fevers/chills, no changes in weight  Eyes: no changes in vision  ENT: no sore throat, no hearing loss, no bloody nose  Pulm: no SOB  CV: no chest pain        Objective:       Exam:  /72 (BP Location: Left arm, Patient Position: Sitting, BP Cuff Size: Adult)   Pulse 68   Temp 36.9 °C (98.4 °F) (Temporal)   Resp 14   Ht 1.721 m (5' 7.75\")  "  Wt 63.1 kg (139 lb 3.2 oz)   SpO2 97%   BMI 21.32 kg/m²  Body mass index is 21.32 kg/m².    Gen: Alert and oriented, No apparent distress  Lungs: Normal effort, CTA bilaterally, no wheezes, rhonchi, or rales  CV: Regular rate and rhythm, no murmurs, rubs, or gallops      Assessment & Plan:     66 y.o. female with the following -     1. Atrial flutter, unspecified type (HCC)  - REFERRAL TO CARDIOLOGY    2. Mixed hyperlipidemia  Patient declines statin therapy, continue whole foods diet and exercise  - Comp Metabolic Panel; Future  - Lipid Profile; Future  - HEMOGLOBIN A1C; Future    3. Hyperglycemia  Stable, continue metformin 500mg BID in addition to healthy diet and exercise  - Comp Metabolic Panel; Future  - Lipid Profile; Future  - HEMOGLOBIN A1C; Future  - MICROALBUMIN CREAT RATIO URINE; Future    4. Asthma-COPD overlap syndrome (HCC)  6. Chronic cough  Anaya is following with Dr. Cruz, endoscopy scheduled per HPI  - CBC WITH DIFFERENTIAL; Future    5. Need for hepatitis C screening test  - HCV Scrn ( [id number] [employer]); Future      Return in about 6 months (around 12/15/2021).    Please note this dictation was created using voice recognition software. I have made every reasonable attempt to correct obvious errors, but I expect there may be errors of grammar, and possibly content, that I did not discover before finalizing the note.

## 2021-06-15 NOTE — LETTER
[facility]  Carla Pacheco M.D.  [address]  [place] [address]  Fax: [phone]   Authorization for Release/Disclosure of   Protected Health Information   Name: ANAYA PACKER : 1955 SSN: [id number]   Address: [address] [place], [place] [address] [place] [address] Phone:    [phone] (home) [phone] (work)   I authorize the entity listed below to release/disclose the PHI below to:   [facility]/Carla Pacheco M.D. and Carla Pacheco M.D.   Provider or Entity Name:     Address   City, State, Zip   Phone:      Fax:     Reason for request: continuity of care   Information to be released:    [  ] LAST COLONOSCOPY,  including any PATH REPORT and follow-up  [  ] LAST FIT/COLOGUARD RESULT [  ] LAST DEXA  [  ] LAST MAMMOGRAM  [  ] LAST PAP  [  ] LAST LABS [  ] RETINA EXAM REPORT  [  ] IMMUNIZATION RECORDS  [  ] Release all info      [  ] Check here and initial the line next to each item to release ALL health information INCLUDING  _____ Care and treatment for drug and / or alcohol abuse  _____ HIV testing, infection status, or AIDS  _____ Genetic Testing    DATES OF SERVICE OR TIME PERIOD TO BE DISCLOSED: _____________  I understand and acknowledge that:  * This Authorization may be revoked at any time by you in writing, except if your health information has already been used or disclosed.  * Your health information that will be used or disclosed as a result of you signing this authorization could be re-disclosed by the recipient. If this occurs, your re-disclosed health information may no longer be protected by State or Federal laws.  * You may refuse to sign this Authorization. Your refusal will not affect your ability to obtain treatment.  * This Authorization becomes effective upon signing and will  on (date) __________.      If no date is indicated, this Authorization will  one (1) year from the signature date.    Name: Anaya Packer    Signature:   Date:          6/15/2021       PLEASE FAX REQUESTED RECORDS BACK TO: [phone]

## 2021-06-15 NOTE — ASSESSMENT & PLAN NOTE
Anaya eats a whole foods diet although does have bread and pasta occasionally. She exercises 30 min daily. HgbA1C 6.1 6/15/20 ([facility]). She reports compliance with metformin 500mg BID. She would like to eventually stop metformin and manage with lifestyle.

## 2021-06-15 NOTE — ASSESSMENT & PLAN NOTE
Patient had labs completed through [facility] 6/2020: T chol 204, TGs 87, HDL 67, . She was recommended to start statin therapy by her previous cardiologist, Dr. Parker, however Anaya would like to avoid statin therapy.

## 2021-06-24 ENCOUNTER — HOSPITAL ENCOUNTER (OUTPATIENT)
Dept: LAB | Facility: MEDICAL CENTER | Age: 66
End: 2021-06-24
Attending: FAMILY MEDICINE
Payer: MEDICARE

## 2021-06-24 DIAGNOSIS — R73.9 HYPERGLYCEMIA: ICD-10-CM

## 2021-06-24 DIAGNOSIS — J44.89 ASTHMA-COPD OVERLAP SYNDROME (HCC): ICD-10-CM

## 2021-06-24 DIAGNOSIS — Z11.59 NEED FOR HEPATITIS C SCREENING TEST: ICD-10-CM

## 2021-06-24 DIAGNOSIS — E78.2 MIXED HYPERLIPIDEMIA: ICD-10-CM

## 2021-06-24 LAB
ALBUMIN SERPL BCP-MCNC: 4.1 G/DL (ref 3.2–4.9)
ALBUMIN/GLOB SERPL: 2 G/DL
ALP SERPL-CCNC: 57 U/L (ref 30–99)
ALT SERPL-CCNC: 19 U/L (ref 2–50)
ANION GAP SERPL CALC-SCNC: 7 MMOL/L (ref 7–16)
AST SERPL-CCNC: 25 U/L (ref 12–45)
BASOPHILS # BLD AUTO: 0.9 % (ref 0–1.8)
BASOPHILS # BLD: 0.04 K/UL (ref 0–0.12)
BILIRUB SERPL-MCNC: <0.2 MG/DL (ref 0.1–1.5)
BUN SERPL-MCNC: 15 MG/DL (ref 8–22)
CALCIUM SERPL-MCNC: 8.9 MG/DL (ref 8.5–10.5)
CHLORIDE SERPL-SCNC: 106 MMOL/L (ref 96–112)
CHOLEST SERPL-MCNC: 215 MG/DL (ref 100–199)
CO2 SERPL-SCNC: 29 MMOL/L (ref 20–33)
CREAT SERPL-MCNC: 0.95 MG/DL (ref 0.5–1.4)
CREAT UR-MCNC: 128.79 MG/DL
EOSINOPHIL # BLD AUTO: 0.28 K/UL (ref 0–0.51)
EOSINOPHIL NFR BLD: 6 % (ref 0–6.9)
ERYTHROCYTE [DISTWIDTH] IN BLOOD BY AUTOMATED COUNT: 44.5 FL (ref 35.9–50)
EST. AVERAGE GLUCOSE BLD GHB EST-MCNC: 126 MG/DL
FASTING STATUS PATIENT QL REPORTED: NORMAL
GLOBULIN SER CALC-MCNC: 2.1 G/DL (ref 1.9–3.5)
GLUCOSE SERPL-MCNC: 121 MG/DL (ref 65–99)
HBA1C MFR BLD: 6 % (ref 4–5.6)
HCT VFR BLD AUTO: 46.2 % (ref 37–47)
HCV AB SER QL: NORMAL
HDLC SERPL-MCNC: 57 MG/DL
HGB BLD-MCNC: 14.8 G/DL (ref 12–16)
IMM GRANULOCYTES # BLD AUTO: 0.01 K/UL (ref 0–0.11)
IMM GRANULOCYTES NFR BLD AUTO: 0.2 % (ref 0–0.9)
LDLC SERPL CALC-MCNC: 135 MG/DL
LYMPHOCYTES # BLD AUTO: 1.51 K/UL (ref 1–4.8)
LYMPHOCYTES NFR BLD: 32.3 % (ref 22–41)
MCH RBC QN AUTO: 30.6 PG (ref 27–33)
MCHC RBC AUTO-ENTMCNC: 32 G/DL (ref 33.6–35)
MCV RBC AUTO: 95.7 FL (ref 81.4–97.8)
MICROALBUMIN UR-MCNC: <1.2 MG/DL
MICROALBUMIN/CREAT UR: NORMAL MG/G (ref 0–30)
MONOCYTES # BLD AUTO: 0.34 K/UL (ref 0–0.85)
MONOCYTES NFR BLD AUTO: 7.3 % (ref 0–13.4)
NEUTROPHILS # BLD AUTO: 2.5 K/UL (ref 2–7.15)
NEUTROPHILS NFR BLD: 53.3 % (ref 44–72)
NRBC # BLD AUTO: 0 K/UL
NRBC BLD-RTO: 0 /100 WBC
PLATELET # BLD AUTO: 183 K/UL (ref 164–446)
PMV BLD AUTO: 10.6 FL (ref 9–12.9)
POTASSIUM SERPL-SCNC: 4.5 MMOL/L (ref 3.6–5.5)
PROT SERPL-MCNC: 6.2 G/DL (ref 6–8.2)
RBC # BLD AUTO: 4.83 M/UL (ref 4.2–5.4)
SODIUM SERPL-SCNC: 142 MMOL/L (ref 135–145)
TRIGL SERPL-MCNC: 117 MG/DL (ref 0–149)
WBC # BLD AUTO: 4.7 K/UL (ref 4.8–10.8)

## 2021-06-24 PROCEDURE — G0472 HEP C SCREEN HIGH RISK/OTHER: HCPCS

## 2021-06-24 PROCEDURE — 36415 COLL VENOUS BLD VENIPUNCTURE: CPT

## 2021-06-24 PROCEDURE — 83036 HEMOGLOBIN GLYCOSYLATED A1C: CPT

## 2021-06-24 PROCEDURE — 80061 LIPID PANEL: CPT

## 2021-06-24 PROCEDURE — 82570 ASSAY OF URINE CREATININE: CPT

## 2021-06-24 PROCEDURE — 82043 UR ALBUMIN QUANTITATIVE: CPT

## 2021-06-24 PROCEDURE — 80053 COMPREHEN METABOLIC PANEL: CPT

## 2021-06-24 PROCEDURE — 85025 COMPLETE CBC W/AUTO DIFF WBC: CPT

## 2021-07-06 ENCOUNTER — OFFICE VISIT (OUTPATIENT)
Dept: CARDIOLOGY | Facility: MEDICAL CENTER | Age: 66
End: 2021-07-06
Attending: FAMILY MEDICINE
Payer: MEDICARE

## 2021-07-06 VITALS
WEIGHT: 138 LBS | BODY MASS INDEX: 21.66 KG/M2 | OXYGEN SATURATION: 94 % | HEART RATE: 72 BPM | SYSTOLIC BLOOD PRESSURE: 108 MMHG | DIASTOLIC BLOOD PRESSURE: 66 MMHG | RESPIRATION RATE: 12 BRPM | HEIGHT: 67 IN

## 2021-07-06 DIAGNOSIS — I48.92 ATRIAL FLUTTER, UNSPECIFIED TYPE (HCC): ICD-10-CM

## 2021-07-06 LAB — EKG IMPRESSION: NORMAL

## 2021-07-06 PROCEDURE — 99214 OFFICE O/P EST MOD 30 MIN: CPT | Performed by: INTERNAL MEDICINE

## 2021-07-06 PROCEDURE — 93000 ELECTROCARDIOGRAM COMPLETE: CPT | Performed by: INTERNAL MEDICINE

## 2021-07-06 ASSESSMENT — FIBROSIS 4 INDEX: FIB4 SCORE: 2.04

## 2021-07-06 NOTE — PROGRESS NOTES
Cardiology Follow-up Consultation Note    Date of note:    7/6/2021    Primary Care Provider: Carla Pacheco M.D.    Name:             Anaya Browne     YOB: 1955  MRN:               [id number]    CC: Follow-up atrial flutter    Patient ID/HPI:   66-year-old female patient here for follow-up.  In 2012 she had brief hospitalization due to supraventricular tachycardia, appears to be in atrial flutter versus atrial tachycardia.  It was broke with adenosine, she never had a return of her symptoms, palpitations.  She was on flecainide for a while but she stopped taking it.  Her brother had CVA, she is afraid regarding cerebrovascular accidents.       ROS  All other systems reviewed and negative.    Past Medical History:   Diagnosis Date   • Atrial fibrillation (HCC)    • Atrial flutter (HCC)    • Back pain    • Chickenpox    • Cough    • Daytime sleepiness    • Depression    • Hoarseness, persistent    • Influenza    • Mumps    • Shortness of breath    • Skin change    • Sputum production    • Sweat, sweating, excessive    • Wears glasses          Past Surgical History:   Procedure Laterality Date   • HYSTERECTOMY LAPAROSCOPY     • HYSTERECTOMY LAPAROSCOPY           Current Outpatient Medications   Medication Sig Dispense Refill   • montelukast (SINGULAIR) 10 MG Tab Take 1 Tab by mouth every bedtime.     • cetirizine (KLS ALLER-DANYELLE) 10 MG Tab Take 10 mg by mouth every day.     • aspirin 81 MG tablet TAKE 1 TABLET BY MOUTH EVERY DAY     • L-THEANINE  mg every day.     • magnesium oxide (MAG-OX) 400 MG Tab Take 400 mg by mouth every day.     • ALPRAZolam (XANAX) 0.5 MG Tab Take 0.25 mg by mouth at bedtime as needed.     • metformin (GLUCOPHAGE) 500 MG Tab Take 500 mg by mouth 2 times a day, with meals.       No current facility-administered medications for this visit.         No Known Allergies      Family History   Problem Relation Age of Onset   • Breast Cancer Mother         breast  cancer 68   • Cancer Mother    • Lung Cancer Father    • Dementia Brother         vascular   • Stroke Brother    • Cancer Maternal Aunt         breast cancer         Social History     Socioeconomic History   • Marital status:      Spouse name: Not on file   • Number of children: Not on file   • Years of education: Not on file   • Highest education level: Not on file   Occupational History   • Not on file   Tobacco Use   • Smoking status: Former Smoker     Packs/day: 0.50     Years: 30.00     Pack years: 15.00     Types: Cigarettes     Quit date: 2012     Years since quittin.4   • Smokeless tobacco: Never Used   Vaping Use   • Vaping Use: Never used   Substance and Sexual Activity   • Alcohol use: Yes     Alcohol/week: 1.5 oz     Types: 3 Glasses of wine per week   • Drug use: Yes     Frequency: 2.0 times per week     Types: Marijuana     Comment: twice a week   • Sexual activity: Not on file   Other Topics Concern   • Not on file   Social History Narrative   • Not on file     Social Determinants of Health     Financial Resource Strain:    • Difficulty of Paying Living Expenses:    Food Insecurity:    • Worried About Running Out of Food in the Last Year:    • Ran Out of Food in the Last Year:    Transportation Needs:    • Lack of Transportation (Medical):    • Lack of Transportation (Non-Medical):    Physical Activity:    • Days of Exercise per Week:    • Minutes of Exercise per Session:    Stress:    • Feeling of Stress :    Social Connections:    • Frequency of Communication with Friends and Family:    • Frequency of Social Gatherings with Friends and Family:    • Attends Temple Services:    • Active Member of Clubs or Organizations:    • Attends Club or Organization Meetings:    • Marital Status:    Intimate Partner Violence:    • Fear of Current or Ex-Partner:    • Emotionally Abused:    • Physically Abused:    • Sexually Abused:          Physical Exam:  Ambulatory Vitals  /66 (BP  "Location: Left arm, Patient Position: Sitting, BP Cuff Size: Adult)   Pulse 72   Resp 12   Ht 1.702 m (5' 7\")   Wt 62.6 kg (138 lb)   SpO2 94%    Oxygen Therapy:  Pulse Oximetry: 94 %  BP Readings from Last 4 Encounters:   07/06/21 108/66   06/15/21 128/72   03/03/21 100/66   02/24/21 108/76       Weight/BMI: Body mass index is 21.61 kg/m².  Wt Readings from Last 4 Encounters:   07/06/21 62.6 kg (138 lb)   06/15/21 63.1 kg (139 lb 3.2 oz)   03/03/21 63 kg (139 lb)   02/24/21 62.2 kg (137 lb 3 oz)       General: Well appearing and in no apparent distress  Head: atrumatic  Eyes: No conjunctival pallor   ENT: normal external appearance of nose and ears  Neck: JVD absent, carotid bruits absent  Lungs: respiratory sounds  normal, additional breath sounds absent  Heart: Regular rhythm,   No palpable thrills on palpation, murmurs absent, no rubs,   Lower extremity edema absent.   Pedal pulses normal  Abdomen: soft, non tender, non distended.  Extremities/MSK: no clubbing, no cyanosis  Neurological: normal orientation, Gait normal   Psychiatric: Appropriate affect, intact judgement and insight  Skin: Warm extremities        Lab Data Review:  Lab Results   Component Value Date/Time    CHOLSTRLTOT 215 (H) 06/24/2021 06:32 AM     (H) 06/24/2021 06:32 AM    HDL 57 06/24/2021 06:32 AM    TRIGLYCERIDE 117 06/24/2021 06:32 AM       Lab Results   Component Value Date/Time    SODIUM 142 06/24/2021 06:32 AM    POTASSIUM 4.5 06/24/2021 06:32 AM    CHLORIDE 106 06/24/2021 06:32 AM    CO2 29 06/24/2021 06:32 AM    GLUCOSE 121 (H) 06/24/2021 06:32 AM    BUN 15 06/24/2021 06:32 AM    CREATININE 0.95 06/24/2021 06:32 AM    BUNCREATRAT 18 11/11/2019 03:42 AM     Lab Results   Component Value Date/Time    ALKPHOSPHAT 57 06/24/2021 06:32 AM    ASTSGOT 25 06/24/2021 06:32 AM    ALTSGPT 19 06/24/2021 06:32 AM    TBILIRUBIN <0.2 06/24/2021 06:32 AM      Lab Results   Component Value Date/Time    WBC 4.7 (L) 06/24/2021 06:32 AM "     Prior cardiology notes, ER visits from 2012 reviewed, EKG from 2012 reviewed shows A. tach versus atrial flutter with 2-1 block    Impression and Plan:  66-year-old female patient with history of 1 episode atrial flutter versus A. tach.  She has no recurrences, recommend 30-day heart monitor, if there are recurrence of atrial fibrillation/flutter consider anticoagulation, otherwise continued monitoring.  For now continue taking aspirin.    Return in about 1 year (around 7/6/2022).      Andres DAI  Interventional cardiologist  [facility], [address]  [address] [address]  Phone: [phone]  Fax: [phone]

## 2021-08-30 ENCOUNTER — PATIENT MESSAGE (OUTPATIENT)
Dept: HEALTH INFORMATION MANAGEMENT | Facility: OTHER | Age: 66
End: 2021-08-30

## 2021-08-31 ENCOUNTER — HOSPITAL ENCOUNTER (OUTPATIENT)
Dept: RADIOLOGY | Facility: MEDICAL CENTER | Age: 66
End: 2021-08-31
Attending: INTERNAL MEDICINE
Payer: MEDICARE

## 2021-08-31 DIAGNOSIS — K44.9 ESOPHAGEAL HIATAL HERNIA: ICD-10-CM

## 2021-08-31 DIAGNOSIS — R05.9 COUGH: ICD-10-CM

## 2021-08-31 PROCEDURE — 74022 RADEX COMPL AQT ABD SERIES: CPT

## 2021-09-01 ENCOUNTER — TELEPHONE (OUTPATIENT)
Dept: CARDIOLOGY | Facility: MEDICAL CENTER | Age: 66
End: 2021-09-01

## 2021-09-01 ENCOUNTER — NON-PROVIDER VISIT (OUTPATIENT)
Dept: CARDIOLOGY | Facility: MEDICAL CENTER | Age: 66
End: 2021-09-01
Payer: MEDICARE

## 2021-09-01 DIAGNOSIS — I48.91 ATRIAL FIBRILLATION, UNSPECIFIED TYPE (HCC): ICD-10-CM

## 2021-09-01 DIAGNOSIS — R00.2 PALPITATIONS: ICD-10-CM

## 2021-09-01 NOTE — TELEPHONE ENCOUNTER
Patient enrolled in 30 day [facility] Heart monitoring program per GIAN Barrios MD.  >In office hookup with [facility] transmitted.  >Pending EOS.

## 2021-09-12 ENCOUNTER — PATIENT MESSAGE (OUTPATIENT)
Dept: CARDIOLOGY | Facility: MEDICAL CENTER | Age: 66
End: 2021-09-12

## 2021-09-13 ENCOUNTER — TELEPHONE (OUTPATIENT)
Dept: CARDIOLOGY | Facility: MEDICAL CENTER | Age: 66
End: 2021-09-13

## 2021-09-13 DIAGNOSIS — I48.92 ATRIAL FLUTTER, UNSPECIFIED TYPE (HCC): ICD-10-CM

## 2021-09-13 RX ORDER — FLECAINIDE ACETATE 50 MG/1
50 TABLET ORAL 2 TIMES DAILY
Qty: 60 TABLET | Refills: 11 | Status: SHIPPED | OUTPATIENT
Start: 2021-09-13 | End: 2021-09-16

## 2021-09-13 NOTE — TELEPHONE ENCOUNTER
AK    Received call from pt regarding her heart monitor and states she sent over a ClearFlow message and would like a call back to further discuss at [phone].     Thank you.

## 2021-09-13 NOTE — PATIENT COMMUNICATION
Andres Barrios M.D.  You 1 minute ago (9:01 AM)     Have her start on metoprolol 25 BID, flecanide 50 BID and eliquis or xarelto, have her see patel or me next week.      ------------------------------------------------------------------------  Called patient to see how she's doing. She says her HR is still in the 120s. Reports fatigue and slight headache, denies dizziness/lightheadedness, CP, SOB.     Discussed MD recommendations, potential side effects of medications, and to take VS daily. Answered all patient questions. Scheduled with Dr. Barrios next week.

## 2021-09-16 ENCOUNTER — TELEPHONE (OUTPATIENT)
Dept: CARDIOLOGY | Facility: MEDICAL CENTER | Age: 66
End: 2021-09-16

## 2021-09-16 ENCOUNTER — HOSPITAL ENCOUNTER (OUTPATIENT)
Dept: LAB | Facility: MEDICAL CENTER | Age: 66
End: 2021-09-16
Attending: NURSE PRACTITIONER
Payer: MEDICARE

## 2021-09-16 ENCOUNTER — OFFICE VISIT (OUTPATIENT)
Dept: CARDIOLOGY | Facility: MEDICAL CENTER | Age: 66
End: 2021-09-16
Payer: MEDICARE

## 2021-09-16 VITALS
BODY MASS INDEX: 21.37 KG/M2 | OXYGEN SATURATION: 90 % | WEIGHT: 141 LBS | DIASTOLIC BLOOD PRESSURE: 72 MMHG | SYSTOLIC BLOOD PRESSURE: 100 MMHG | RESPIRATION RATE: 16 BRPM | HEART RATE: 108 BPM | HEIGHT: 68 IN

## 2021-09-16 DIAGNOSIS — I48.92 ATRIAL FLUTTER, UNSPECIFIED TYPE (HCC): ICD-10-CM

## 2021-09-16 DIAGNOSIS — E11.65 TYPE 2 DIABETES MELLITUS WITH HYPERGLYCEMIA, WITHOUT LONG-TERM CURRENT USE OF INSULIN (HCC): ICD-10-CM

## 2021-09-16 DIAGNOSIS — Z79.899 HIGH RISK MEDICATION USE: ICD-10-CM

## 2021-09-16 DIAGNOSIS — Z79.01 CHRONIC ANTICOAGULATION: ICD-10-CM

## 2021-09-16 LAB
ANION GAP SERPL CALC-SCNC: 10 MMOL/L (ref 7–16)
BUN SERPL-MCNC: 15 MG/DL (ref 8–22)
CALCIUM SERPL-MCNC: 10.2 MG/DL (ref 8.5–10.5)
CHLORIDE SERPL-SCNC: 106 MMOL/L (ref 96–112)
CO2 SERPL-SCNC: 28 MMOL/L (ref 20–33)
CREAT SERPL-MCNC: 1.01 MG/DL (ref 0.5–1.4)
EKG IMPRESSION: NORMAL
GLUCOSE SERPL-MCNC: 73 MG/DL (ref 65–99)
POTASSIUM SERPL-SCNC: 4.5 MMOL/L (ref 3.6–5.5)
SODIUM SERPL-SCNC: 144 MMOL/L (ref 135–145)
TSH SERPL DL<=0.005 MIU/L-ACNC: 2.18 UIU/ML (ref 0.38–5.33)

## 2021-09-16 PROCEDURE — 36415 COLL VENOUS BLD VENIPUNCTURE: CPT

## 2021-09-16 PROCEDURE — 84443 ASSAY THYROID STIM HORMONE: CPT

## 2021-09-16 PROCEDURE — 80048 BASIC METABOLIC PNL TOTAL CA: CPT

## 2021-09-16 PROCEDURE — 93000 ELECTROCARDIOGRAM COMPLETE: CPT | Performed by: INTERNAL MEDICINE

## 2021-09-16 PROCEDURE — 99215 OFFICE O/P EST HI 40 MIN: CPT | Performed by: NURSE PRACTITIONER

## 2021-09-16 RX ORDER — ESTRADIOL 0.1 MG/G
1 CREAM VAGINAL SEE ADMIN INSTRUCTIONS
COMMUNITY
Start: 2021-09-06

## 2021-09-16 RX ORDER — OMEPRAZOLE 20 MG/1
CAPSULE, DELAYED RELEASE ORAL
COMMUNITY
Start: 2021-08-11 | End: 2021-09-22

## 2021-09-16 RX ORDER — FLECAINIDE ACETATE 50 MG/1
50 TABLET ORAL 2 TIMES DAILY
Qty: 180 TABLET | Refills: 2 | Status: ON HOLD | OUTPATIENT
Start: 2021-09-16 | End: 2021-11-01

## 2021-09-16 ASSESSMENT — ENCOUNTER SYMPTOMS
BLOOD IN STOOL: 0
PALPITATIONS: 1
WEIGHT LOSS: 0
ABDOMINAL PAIN: 0
CLAUDICATION: 0
SHORTNESS OF BREATH: 0
BRUISES/BLEEDS EASILY: 0
FEVER: 0
FALLS: 0
ORTHOPNEA: 0
TINGLING: 0
DIZZINESS: 0
LOSS OF CONSCIOUSNESS: 0
BLURRED VISION: 0
PND: 0
COUGH: 0
MYALGIAS: 0
NAUSEA: 0
DIAPHORESIS: 1
VOMITING: 0

## 2021-09-16 ASSESSMENT — FIBROSIS 4 INDEX: FIB4 SCORE: 2.04

## 2021-09-16 NOTE — PROGRESS NOTES
Chief Complaint   Patient presents with   • Atrial Flutter   • Hyperlipidemia     F/V Dx: Mixed hyperlipidemia       Subjective     Korin Browne is a 66 y.o. female who presents today for a flutter follow-up.  Patient was last seen by Dr. Barrios on 7/6/2021. Patient is also followed by Dr. Parker.  Since patient was last seen, urgent prior to report of a flutter on cardiac event monitor.    EKG in office personally interpreted by me as atrial flutter with ventricular rate of 115.  This was reviewed with Dr. Leon in office today as well.    Today, patient reports that on Saturday she had an extra glass of wine and noticed elevated heart rate in the 160s that did not return to baseline on its own.  Patient reports fatigue, diaphoresis and decreased activity tolerance with weight gain of approximately 5 pounds.  Patient was started on metoprolol Xarelto and flecainide on Monday.  Patient reports she is doing well with this medication however notes low blood pressure 100s over 60s at home. Patient denies chest pain, shortness of breath, dizziness/lightheadedness, orthopnea, PND or Edema.     Patient is also concerned as she is to be traveling in 2 weeks.    We discussed the importance of medication compliance in the interim.  We will also refer her for FABIANA/DCCV before she travels.  We will also have her follow-up with electrophysiology to discuss possible a flutter ablation.    Based on physical examination findings, patient is euvolemic. No JVD, lungs are clear to auscultation, no pitting edema in bilateral lower extremities, no ascites.  Weight in office today is 141 pounds.  This is elevated from her last appointment with Dr. Barrios.  We discussed diuretics for potential fluid overload while in aflutter, the patient would prefer to monitor weight for 1 more week.  We discussed the importance of monitoring weights at home daily. Call office if weight increasing greater than 3 lbs in 1 day or greater than 5 lbs  in 1 week.     The risks, benefits, and alternatives to electrical cardioversion were discussed. Conversion of atrial fibrillation to normal rhythm, at least transiently, is successful in 90 to 95% of patients, maintaining a normal rhythm depends on a number of factors, including underlying heart disease and antiarrhythmic medications. Atrial fibrillation often recurs with time and other treatments may be necessary. Risks of  cardioversion are low as long as anticoagulation issues are handled appropriately. There is a small (less than 1%) risk of embolic events, including stroke. Risks of electrical shock include mild muscle soreness and mild skin burning at the site of electrode placement. There is also a risk that cardioversion can stimulate more dangerous arrhythmias. The patient verbalized understanding of these potential complications and wishes to proceed with this procedure.    The risks, benefits, and alternatives to transesophageal echocardiogram with IV sedation were discussed with the patient in specific detail, including oropharyngeal and esophageal traumas including hoarseness and dysphagia after the procedure. Rare cases demonstrating serious or fatal complications associated with transesophageal echocardiogram have been reported in the adult population, including cardiac, pulmonary and bleeding complications in less than 1% of people. Patients with an identified intracardiac thrombus are at increased risk for embolic events and this appears to be reduced with anticoagulant therapy. The patient verbalized understandings about these  possible complications and wishes to proceed with this procedure      Past Medical History:   Diagnosis Date   • Atrial fibrillation (HCC)    • Atrial flutter (HCC)    • Back pain    • Chickenpox    • Cough    • Daytime sleepiness    • Depression    • Hoarseness, persistent    • Influenza    • Mumps    • Shortness of breath    • Skin change    • Sputum production    •  Sweat, sweating, excessive    • Wears glasses      Past Surgical History:   Procedure Laterality Date   • HYSTERECTOMY LAPAROSCOPY     • HYSTERECTOMY LAPAROSCOPY       Family History   Problem Relation Age of Onset   • Breast Cancer Mother         breast cancer 68   • Cancer Mother    • Lung Cancer Father    • Dementia Brother         vascular   • Stroke Brother    • Cancer Maternal Aunt         breast cancer     Social History     Socioeconomic History   • Marital status:      Spouse name: Not on file   • Number of children: Not on file   • Years of education: Not on file   • Highest education level: Not on file   Occupational History   • Not on file   Tobacco Use   • Smoking status: Former Smoker     Packs/day: 0.50     Years: 30.00     Pack years: 15.00     Types: Cigarettes     Quit date: 2012     Years since quittin.6   • Smokeless tobacco: Never Used   Vaping Use   • Vaping Use: Never used   Substance and Sexual Activity   • Alcohol use: Yes     Alcohol/week: 1.5 oz     Types: 3 Glasses of wine per week   • Drug use: Yes     Frequency: 2.0 times per week     Types: Marijuana, Oral     Comment: twice a week   • Sexual activity: Not on file   Other Topics Concern   • Not on file   Social History Narrative   • Not on file     Social Determinants of Health     Financial Resource Strain:    • Difficulty of Paying Living Expenses:    Food Insecurity:    • Worried About Running Out of Food in the Last Year:    • Ran Out of Food in the Last Year:    Transportation Needs:    • Lack of Transportation (Medical):    • Lack of Transportation (Non-Medical):    Physical Activity:    • Days of Exercise per Week:    • Minutes of Exercise per Session:    Stress:    • Feeling of Stress :    Social Connections:    • Frequency of Communication with Friends and Family:    • Frequency of Social Gatherings with Friends and Family:    • Attends [employer] Services:    • Active Member of Clubs or Organizations:    •  Attends Club or Organization Meetings:    • Marital Status:    Intimate Partner Violence:    • Fear of Current or Ex-Partner:    • Emotionally Abused:    • Physically Abused:    • Sexually Abused:      No Known Allergies  Outpatient Encounter Medications as of 9/16/2021   Medication Sig Dispense Refill   • estradiol (ESTRACE) 0.1 MG/GM vaginal cream APPLY 1/2 GRAM AROUND THE VULVA/URETHRA TWICE A WEEK     • omeprazole (PRILOSEC) 20 MG delayed-release capsule TAKE 1 CAPSULE BY MOUTH ONCE A DAY 30 MINUTES BEFORE BREAKFAST MEAL     • flecainide (TAMBOCOR) 50 MG tablet Take 1 Tablet by mouth 2 times a day. 180 Tablet 2   • metoprolol tartrate (LOPRESSOR) 25 MG Tab Take 1 Tablet by mouth 2 times a day. 180 Tablet 2   • rivaroxaban (XARELTO) 20 MG Tab tablet Take 1 Tablet by mouth with dinner. 90 Tablet 3   • montelukast (SINGULAIR) 10 MG Tab Take 1 Tab by mouth every bedtime.     • cetirizine (KLS ALLER-DANYELLE) 10 MG Tab Take 10 mg by mouth every day.     • L-THEANINE  mg every day.     • magnesium oxide (MAG-OX) 400 MG Tab Take 400 mg by mouth every day.     • ALPRAZolam (XANAX) 0.5 MG Tab Take 0.25 mg by mouth at bedtime as needed.     • metformin (GLUCOPHAGE) 500 MG Tab Take 500 mg by mouth 2 times a day, with meals.     • [DISCONTINUED] metoprolol tartrate (LOPRESSOR) 25 MG Tab Take 1 Tablet by mouth 2 times a day. 60 Tablet 11   • [DISCONTINUED] flecainide (TAMBOCOR) 50 MG tablet Take 1 Tablet by mouth 2 times a day. 60 Tablet 11     No facility-administered encounter medications on file as of 9/16/2021.     Review of Systems   Constitutional: Positive for diaphoresis and malaise/fatigue. Negative for fever and weight loss.   Eyes: Negative for blurred vision.   Respiratory: Negative for cough and shortness of breath.    Cardiovascular: Positive for palpitations. Negative for chest pain, orthopnea, claudication, leg swelling and PND.   Gastrointestinal: Negative for abdominal pain, blood in stool, nausea and  "vomiting.   Genitourinary: Negative for dysuria, frequency and hematuria.   Musculoskeletal: Negative for falls and myalgias.   Neurological: Negative for dizziness, tingling and loss of consciousness.   Endo/Heme/Allergies: Does not bruise/bleed easily.            Objective     /72 (BP Location: Left arm, Patient Position: Sitting, BP Cuff Size: Adult)   Pulse (!) 108   Resp 16   Ht 1.721 m (5' 7.75\")   Wt 64 kg (141 lb)   SpO2 90%   BMI 21.60 kg/m²     Physical Exam  Vitals reviewed.   Constitutional:       Appearance: She is well-developed.   HENT:      Head: Normocephalic and atraumatic.   Eyes:      Pupils: Pupils are equal, round, and reactive to light.   Neck:      Vascular: No JVD.   Cardiovascular:      Rate and Rhythm: Normal rate. Rhythm irregular.      Pulses: Normal pulses.      Heart sounds: Normal heart sounds. No murmur heard.   No friction rub. No gallop.    Pulmonary:      Effort: Pulmonary effort is normal. No respiratory distress.      Breath sounds: Normal breath sounds.   Chest:      Comments: Regularly irregular  Abdominal:      General: Bowel sounds are normal. There is no distension.      Palpations: Abdomen is soft.   Musculoskeletal:      Right lower leg: No edema.      Left lower leg: No edema.   Skin:     General: Skin is warm and dry.      Findings: No erythema.   Neurological:      General: No focal deficit present.      Mental Status: She is alert and oriented to person, place, and time. Mental status is at baseline.   Psychiatric:         Mood and Affect: Mood normal.         Behavior: Behavior normal.                Assessment & Plan     1. Atrial flutter, unspecified type (HCC)  EKG    CL-CARDIOVERSION    EC-FABIANA W/O CONT    REFERRAL TO CARDIOLOGY    CANCELED: Basic Metabolic Panel    CANCELED: TSH   2. High risk medication use  CL-CARDIOVERSION    EC-FABIANA W/O CONT    REFERRAL TO CARDIOLOGY    CANCELED: Basic Metabolic Panel    CANCELED: TSH   3. Type 2 diabetes mellitus " with hyperglycemia, without long-term current use of insulin (HCC)  CL-CARDIOVERSION    EC-FABIANA W/O CONT    REFERRAL TO CARDIOLOGY    CANCELED: Basic Metabolic Panel    CANCELED: TSH   4. Chronic anticoagulation         Medical Decision Making: Today's Assessment/Status/Plan:        New onset Aflutter 9/12/2021  -Symptomatic, rate slightly elevated  -Orders for FABIANA/DCCV placed.  To schedule ASAP.  -Started OAC with Xarelto on Monday, continue.  -Started metoprolol 25 mg twice daily on Monday, continue  -Resume flecainide 50 mg twice daily on Monday, continue  -Check TSH  -Refer to EP to discuss possible ablation    Hyperlipidemia  -Per PCP    T2DM  -Per PCP    High risk medication use; Chronic OAC  -This includes flecainide and Xarelto  -Denies signs or symptoms of bleeding  -Will continue to closely monitor for side effects of patient's high risk medication(s) including liver, renal function and electrolytes  -Close monitoring discussed with patient.  Lab work ordered.    FU in clinic as scheduled next week. Sooner if needed.    Patient verbalizes understanding and agrees with the plan of care.     Collaborating MD: Dr. Tripp MD    PLEASE NOTE: This Note was created using voice recognition Software. I have made every reasonable attempt to correct obvious errors, but I expect that there are errors of grammar and possibly content that I did not discover before finalizing the note

## 2021-09-16 NOTE — TELEPHONE ENCOUNTER
Patient is scheduled on 9-23-21 for a FABIANA/CV w/anesthesia with Dr. Almaguer. Patient was told to hold metformin AM day of procedure and to check in at 9:30 for an 11:30 procedure. H&P was done on 9-16-21 by Chanel Bullock. Pre admit to call patient.

## 2021-09-16 NOTE — TELEPHONE ENCOUNTER
----- Message from Digna Friend, Med Ass't sent at 9/16/2021  9:00 AM PDT -----  Good morning just wanted to let you know that [facility] ordered a Cardioversion for this pt and a FABIANA and she wants it scheduled ASAP. Thank you!!!

## 2021-09-17 ENCOUNTER — PRE-ADMISSION TESTING (OUTPATIENT)
Dept: ADMISSIONS | Facility: MEDICAL CENTER | Age: 66
End: 2021-09-17
Attending: INTERNAL MEDICINE
Payer: MEDICARE

## 2021-09-17 DIAGNOSIS — Z01.812 PRE-OPERATIVE LABORATORY EXAMINATION: ICD-10-CM

## 2021-09-17 ASSESSMENT — FIBROSIS 4 INDEX: FIB4 SCORE: 2.04

## 2021-09-20 ENCOUNTER — PRE-ADMISSION TESTING (OUTPATIENT)
Dept: ADMISSIONS | Facility: MEDICAL CENTER | Age: 66
End: 2021-09-20
Payer: MEDICARE

## 2021-09-20 ENCOUNTER — PATIENT MESSAGE (OUTPATIENT)
Dept: CARDIOLOGY | Facility: MEDICAL CENTER | Age: 66
End: 2021-09-20

## 2021-09-20 DIAGNOSIS — Z01.812 PRE-OPERATIVE LABORATORY EXAMINATION: ICD-10-CM

## 2021-09-20 LAB
ANION GAP SERPL CALC-SCNC: 10 MMOL/L (ref 7–16)
BUN SERPL-MCNC: 23 MG/DL (ref 8–22)
CALCIUM SERPL-MCNC: 9.9 MG/DL (ref 8.5–10.5)
CHLORIDE SERPL-SCNC: 106 MMOL/L (ref 96–112)
CO2 SERPL-SCNC: 28 MMOL/L (ref 20–33)
CREAT SERPL-MCNC: 1.18 MG/DL (ref 0.5–1.4)
ERYTHROCYTE [DISTWIDTH] IN BLOOD BY AUTOMATED COUNT: 45.7 FL (ref 35.9–50)
GLUCOSE SERPL-MCNC: 64 MG/DL (ref 65–99)
HCT VFR BLD AUTO: 48.8 % (ref 37–47)
HGB BLD-MCNC: 15.8 G/DL (ref 12–16)
INR PPP: 1.26 (ref 0.87–1.13)
MCH RBC QN AUTO: 31.2 PG (ref 27–33)
MCHC RBC AUTO-ENTMCNC: 32.4 G/DL (ref 33.6–35)
MCV RBC AUTO: 96.4 FL (ref 81.4–97.8)
PLATELET # BLD AUTO: 249 K/UL (ref 164–446)
PMV BLD AUTO: 10.5 FL (ref 9–12.9)
POTASSIUM SERPL-SCNC: 4.6 MMOL/L (ref 3.6–5.5)
PROTHROMBIN TIME: 15.4 SEC (ref 12–14.6)
RBC # BLD AUTO: 5.06 M/UL (ref 4.2–5.4)
SARS-COV-2 RNA RESP QL NAA+PROBE: NOTDETECTED
SODIUM SERPL-SCNC: 144 MMOL/L (ref 135–145)
SPECIMEN SOURCE: NORMAL
WBC # BLD AUTO: 6.8 K/UL (ref 4.8–10.8)

## 2021-09-20 PROCEDURE — U0003 INFECTIOUS AGENT DETECTION BY NUCLEIC ACID (DNA OR RNA); SEVERE ACUTE RESPIRATORY SYNDROME CORONAVIRUS 2 (SARS-COV-2) (CORONAVIRUS DISEASE [COVID-19]), AMPLIFIED PROBE TECHNIQUE, MAKING USE OF HIGH THROUGHPUT TECHNOLOGIES AS DESCRIBED BY CMS-2020-01-R: HCPCS

## 2021-09-20 PROCEDURE — U0005 INFEC AGEN DETEC AMPLI PROBE: HCPCS

## 2021-09-20 PROCEDURE — C9803 HOPD COVID-19 SPEC COLLECT: HCPCS

## 2021-09-20 PROCEDURE — 85027 COMPLETE CBC AUTOMATED: CPT

## 2021-09-20 PROCEDURE — 36415 COLL VENOUS BLD VENIPUNCTURE: CPT

## 2021-09-20 PROCEDURE — 85610 PROTHROMBIN TIME: CPT

## 2021-09-20 PROCEDURE — 80048 BASIC METABOLIC PNL TOTAL CA: CPT

## 2021-09-20 NOTE — TELEPHONE ENCOUNTER
----- Message from Anthony Almaguer M.D. sent at 9/20/2021 12:32 PM PDT -----  Labs look good, please let her know     Thank you

## 2021-09-22 ENCOUNTER — OFFICE VISIT (OUTPATIENT)
Dept: CARDIOLOGY | Facility: MEDICAL CENTER | Age: 66
End: 2021-09-22
Payer: MEDICARE

## 2021-09-22 VITALS
OXYGEN SATURATION: 98 % | HEIGHT: 68 IN | DIASTOLIC BLOOD PRESSURE: 62 MMHG | HEART RATE: 123 BPM | RESPIRATION RATE: 12 BRPM | WEIGHT: 136 LBS | BODY MASS INDEX: 20.61 KG/M2 | SYSTOLIC BLOOD PRESSURE: 106 MMHG

## 2021-09-22 DIAGNOSIS — I48.92 ATRIAL FLUTTER, UNSPECIFIED TYPE (HCC): ICD-10-CM

## 2021-09-22 LAB — EKG IMPRESSION: NORMAL

## 2021-09-22 PROCEDURE — 93000 ELECTROCARDIOGRAM COMPLETE: CPT | Performed by: INTERNAL MEDICINE

## 2021-09-22 PROCEDURE — 99214 OFFICE O/P EST MOD 30 MIN: CPT | Performed by: INTERNAL MEDICINE

## 2021-09-22 ASSESSMENT — FIBROSIS 4 INDEX: FIB4 SCORE: 1.5

## 2021-09-22 NOTE — PROGRESS NOTES
Cardiology Follow-up Consultation Note    Date of note:    9/22/2021    Primary Care Provider: Carla Pacheco M.D.    Name:             Anaya Browne     YOB: 1955  MRN:               [id number]    CC: Tachycardia    Patient ID/HPI:   66-year-old female patient here for follow-up because of tachycardia, atrial flutter.  She was seen by me initially for palpitations, evaluation showed atrial flutter.  She is scheduled to have FABIANA cardioversion tomorrow, electrophysiology follow-up is made.  She feels tired, her blood pressure is lower.  She has been having diarrhea since started on flecainide Xarelto, metoprolol.        Past Medical History:   Diagnosis Date   • Atrial fibrillation (HCC)    • Atrial flutter (HCC)    • Back pain    • Chickenpox    • Cough    • Daytime sleepiness    • Depression    • Diabetes (HCC) 09/17/2021    Takes Metformin   • GERD (gastroesophageal reflux disease)    • Hoarseness, persistent    • Influenza    • Mumps    • Shortness of breath    • Skin change    • Sputum production    • Sweat, sweating, excessive    • Wears glasses          Past Surgical History:   Procedure Laterality Date   • HYSTERECTOMY LAPAROSCOPY     • HYSTERECTOMY LAPAROSCOPY     • MAMMOPLASTY AUGMENTATION Bilateral    • OTHER      EGD with Bravo Capsule Placed 8-.         Current Outpatient Medications   Medication Sig Dispense Refill   • estradiol (ESTRACE) 0.1 MG/GM vaginal cream APPLY 1/2 GRAM AROUND THE VULVA/URETHRA TWICE A WEEK     • flecainide (TAMBOCOR) 50 MG tablet Take 1 Tablet by mouth 2 times a day. 180 Tablet 2   • metoprolol tartrate (LOPRESSOR) 25 MG Tab Take 1 Tablet by mouth 2 times a day. 180 Tablet 2   • rivaroxaban (XARELTO) 20 MG Tab tablet Take 1 Tablet by mouth with dinner. 90 Tablet 3   • montelukast (SINGULAIR) 10 MG Tab Take 1 Tab by mouth every bedtime.     • cetirizine (KLS ALLER-DANYELLE) 10 MG Tab Take 10 mg by mouth every day.     • L-THEANINE  mg  every day.     • magnesium oxide (MAG-OX) 400 MG Tab Take 400 mg by mouth every day.     • ALPRAZolam (XANAX) 0.5 MG Tab Take 0.25 mg by mouth at bedtime as needed.     • metformin (GLUCOPHAGE) 500 MG Tab Take 500 mg by mouth 2 times a day, with meals.       No current facility-administered medications for this visit.         No Known Allergies      Family History   Problem Relation Age of Onset   • Breast Cancer Mother         breast cancer 68   • Cancer Mother    • Lung Cancer Father    • Dementia Brother         vascular   • Stroke Brother    • Cancer Maternal Aunt         breast cancer         Social History     Socioeconomic History   • Marital status:      Spouse name: Not on file   • Number of children: Not on file   • Years of education: Not on file   • Highest education level: Not on file   Occupational History   • Not on file   Tobacco Use   • Smoking status: Former Smoker     Packs/day: 0.50     Years: 30.00     Pack years: 15.00     Types: Cigarettes     Quit date: 2012     Years since quittin.6   • Smokeless tobacco: Never Used   Vaping Use   • Vaping Use: Never used   Substance and Sexual Activity   • Alcohol use: Yes     Alcohol/week: 1.5 oz     Types: 3 Glasses of wine per week   • Drug use: Not Currently     Frequency: 2.0 times per week     Types: Marijuana, Oral     Comment: twice a week   • Sexual activity: Not on file   Other Topics Concern   • Not on file   Social History Narrative   • Not on file     Social Determinants of Health     Financial Resource Strain:    • Difficulty of Paying Living Expenses:    Food Insecurity:    • Worried About Running Out of Food in the Last Year:    • Ran Out of Food in the Last Year:    Transportation Needs:    • Lack of Transportation (Medical):    • Lack of Transportation (Non-Medical):    Physical Activity:    • Days of Exercise per Week:    • Minutes of Exercise per Session:    Stress:    • Feeling of Stress :    Social Connections:    •  "Frequency of Communication with Friends and Family:    • Frequency of Social Gatherings with Friends and Family:    • Attends Z[employer]astrian Services:    • Active Member of Clubs or Organizations:    • Attends Club or Organization Meetings:    • Marital Status:    Intimate Partner Violence:    • Fear of Current or Ex-Partner:    • Emotionally Abused:    • Physically Abused:    • Sexually Abused:          Physical Exam:  Ambulatory Vitals  /62 (BP Location: Left arm, Patient Position: Sitting, BP Cuff Size: Adult)   Pulse (!) 123   Resp 12   Ht 1.721 m (5' 7.75\")   Wt 61.7 kg (136 lb)   SpO2 98%    Oxygen Therapy:  Pulse Oximetry: 98 %  BP Readings from Last 4 Encounters:   09/22/21 106/62   09/16/21 100/72   07/06/21 108/66   06/15/21 128/72       Weight/BMI: Body mass index is 20.83 kg/m².  Wt Readings from Last 4 Encounters:   09/22/21 61.7 kg (136 lb)   09/16/21 64 kg (141 lb)   07/06/21 62.6 kg (138 lb)   06/15/21 63.1 kg (139 lb 3.2 oz)       General: Well appearing and in no apparent distress  Head: atrumatic  Eyes: No conjunctival pallor   ENT: normal external appearance of nose and ears  Neck: JVD absent, carotid bruits absent  Lungs: respiratory sounds  normal, additional breath sounds absent  Heart: Regular rhythm, tachycardia   No palpable thrills on palpation, murmurs absent, no rubs,   Lower extremity edema absent.   Abdomen: soft, non tender, non distended.  Extremities/MSK: no clubbing, no cyanosis  Neurological: normal orientation, Gait normal   Psychiatric: Appropriate affect, intact judgement and insight  Skin: Warm extremities      Lab Data Review:  Lab Results   Component Value Date/Time    CHOLSTRLTOT 215 (H) 06/24/2021 06:32 AM     (H) 06/24/2021 06:32 AM    HDL 57 06/24/2021 06:32 AM    TRIGLYCERIDE 117 06/24/2021 06:32 AM       Lab Results   Component Value Date/Time    SODIUM 144 09/20/2021 08:01 AM    POTASSIUM 4.6 09/20/2021 08:01 AM    CHLORIDE 106 09/20/2021 08:01 AM    CO2 " 28 09/20/2021 08:01 AM    GLUCOSE 64 (L) 09/20/2021 08:01 AM    BUN 23 (H) 09/20/2021 08:01 AM    CREATININE 1.18 09/20/2021 08:01 AM    BUNCREATRAT 18 11/11/2019 03:42 AM     Lab Results   Component Value Date/Time    ALKPHOSPHAT 57 06/24/2021 06:32 AM    ASTSGOT 25 06/24/2021 06:32 AM    ALTSGPT 19 06/24/2021 06:32 AM    TBILIRUBIN <0.2 06/24/2021 06:32 AM      Lab Results   Component Value Date/Time    WBC 6.8 09/20/2021 08:01 AM     EKG today shows atrial flutter, heart rate 120    Impression and Plan:  66-year-old female patient with atrial flutter with rapid ventricular response.  Proceed with FABIANA cardioversion tomorrow.  For now continue metoprolol, Xarelto, flecainide.  She took flecainide in the past, tolerated well.  Its either metoprolol or Xarelto causing her diarrhea.  If diarrhea persist we might have to switch these medications.  Since its atrial flutter, I would like her to get ablation procedure, get her off metoprolol, flecainide after the ablation.    She will update me regarding her fatigue, diarrhea in a week.    Andres DAI  Interventional cardiologist  [facility], [address]  [address] [address]  Phone: [phone]  Fax: [phone]

## 2021-09-23 ENCOUNTER — ANESTHESIA (OUTPATIENT)
Dept: CARDIOLOGY | Facility: MEDICAL CENTER | Age: 66
End: 2021-09-23
Payer: MEDICARE

## 2021-09-23 ENCOUNTER — HOSPITAL ENCOUNTER (OUTPATIENT)
Facility: MEDICAL CENTER | Age: 66
End: 2021-09-23
Attending: INTERNAL MEDICINE | Admitting: INTERNAL MEDICINE
Payer: MEDICARE

## 2021-09-23 ENCOUNTER — APPOINTMENT (OUTPATIENT)
Dept: CARDIOLOGY | Facility: MEDICAL CENTER | Age: 66
End: 2021-09-23
Attending: NURSE PRACTITIONER
Payer: MEDICARE

## 2021-09-23 ENCOUNTER — ANESTHESIA EVENT (OUTPATIENT)
Dept: CARDIOLOGY | Facility: MEDICAL CENTER | Age: 66
End: 2021-09-23
Payer: MEDICARE

## 2021-09-23 VITALS
SYSTOLIC BLOOD PRESSURE: 100 MMHG | BODY MASS INDEX: 20.41 KG/M2 | OXYGEN SATURATION: 96 % | TEMPERATURE: 97 F | HEART RATE: 64 BPM | DIASTOLIC BLOOD PRESSURE: 64 MMHG | RESPIRATION RATE: 16 BRPM | WEIGHT: 134.7 LBS | HEIGHT: 68 IN

## 2021-09-23 DIAGNOSIS — Z79.899 HIGH RISK MEDICATION USE: ICD-10-CM

## 2021-09-23 DIAGNOSIS — E11.65 TYPE 2 DIABETES MELLITUS WITH HYPERGLYCEMIA, WITHOUT LONG-TERM CURRENT USE OF INSULIN (HCC): ICD-10-CM

## 2021-09-23 DIAGNOSIS — I48.92 ATRIAL FLUTTER, UNSPECIFIED TYPE (HCC): ICD-10-CM

## 2021-09-23 LAB
EKG IMPRESSION: NORMAL
EKG IMPRESSION: NORMAL
GLUCOSE BLD-MCNC: 114 MG/DL (ref 65–99)
INR PPP: 1.14 (ref 0.87–1.13)
PROTHROMBIN TIME: 14.3 SEC (ref 12–14.6)

## 2021-09-23 PROCEDURE — 160002 HCHG RECOVERY MINUTES (STAT)

## 2021-09-23 PROCEDURE — 92960 CARDIOVERSION ELECTRIC EXT: CPT

## 2021-09-23 PROCEDURE — 92960 CARDIOVERSION ELECTRIC EXT: CPT | Performed by: INTERNAL MEDICINE

## 2021-09-23 PROCEDURE — 700105 HCHG RX REV CODE 258: Performed by: INTERNAL MEDICINE

## 2021-09-23 PROCEDURE — 93325 DOPPLER ECHO COLOR FLOW MAPG: CPT

## 2021-09-23 PROCEDURE — 700101 HCHG RX REV CODE 250: Performed by: ANESTHESIOLOGY

## 2021-09-23 PROCEDURE — 93010 ELECTROCARDIOGRAM REPORT: CPT | Mod: 59,76 | Performed by: INTERNAL MEDICINE

## 2021-09-23 PROCEDURE — 93312 ECHO TRANSESOPHAGEAL: CPT | Mod: 26,52 | Performed by: INTERNAL MEDICINE

## 2021-09-23 PROCEDURE — 82962 GLUCOSE BLOOD TEST: CPT

## 2021-09-23 PROCEDURE — 93005 ELECTROCARDIOGRAM TRACING: CPT | Performed by: INTERNAL MEDICINE

## 2021-09-23 PROCEDURE — 85610 PROTHROMBIN TIME: CPT

## 2021-09-23 RX ORDER — MEPERIDINE HYDROCHLORIDE 25 MG/ML
6.25 INJECTION INTRAMUSCULAR; INTRAVENOUS; SUBCUTANEOUS
Status: DISCONTINUED | OUTPATIENT
Start: 2021-09-23 | End: 2021-09-23 | Stop reason: HOSPADM

## 2021-09-23 RX ORDER — DIPHENHYDRAMINE HYDROCHLORIDE 50 MG/ML
12.5 INJECTION INTRAMUSCULAR; INTRAVENOUS
Status: DISCONTINUED | OUTPATIENT
Start: 2021-09-23 | End: 2021-09-23 | Stop reason: HOSPADM

## 2021-09-23 RX ORDER — HYDROMORPHONE HYDROCHLORIDE 1 MG/ML
0.2 INJECTION, SOLUTION INTRAMUSCULAR; INTRAVENOUS; SUBCUTANEOUS
Status: DISCONTINUED | OUTPATIENT
Start: 2021-09-23 | End: 2021-09-23 | Stop reason: HOSPADM

## 2021-09-23 RX ORDER — HYDROMORPHONE HYDROCHLORIDE 1 MG/ML
0.1 INJECTION, SOLUTION INTRAMUSCULAR; INTRAVENOUS; SUBCUTANEOUS
Status: DISCONTINUED | OUTPATIENT
Start: 2021-09-23 | End: 2021-09-23 | Stop reason: HOSPADM

## 2021-09-23 RX ORDER — OXYCODONE HCL 5 MG/5 ML
10 SOLUTION, ORAL ORAL
Status: DISCONTINUED | OUTPATIENT
Start: 2021-09-23 | End: 2021-09-23 | Stop reason: HOSPADM

## 2021-09-23 RX ORDER — HALOPERIDOL 5 MG/ML
1 INJECTION INTRAMUSCULAR
Status: DISCONTINUED | OUTPATIENT
Start: 2021-09-23 | End: 2021-09-23 | Stop reason: HOSPADM

## 2021-09-23 RX ORDER — LIDOCAINE HYDROCHLORIDE 20 MG/ML
INJECTION, SOLUTION EPIDURAL; INFILTRATION; INTRACAUDAL; PERINEURAL PRN
Status: DISCONTINUED | OUTPATIENT
Start: 2021-09-23 | End: 2021-09-23 | Stop reason: SURG

## 2021-09-23 RX ORDER — ONDANSETRON 2 MG/ML
4 INJECTION INTRAMUSCULAR; INTRAVENOUS
Status: DISCONTINUED | OUTPATIENT
Start: 2021-09-23 | End: 2021-09-23 | Stop reason: HOSPADM

## 2021-09-23 RX ORDER — HYDROMORPHONE HYDROCHLORIDE 1 MG/ML
0.4 INJECTION, SOLUTION INTRAMUSCULAR; INTRAVENOUS; SUBCUTANEOUS
Status: DISCONTINUED | OUTPATIENT
Start: 2021-09-23 | End: 2021-09-23 | Stop reason: HOSPADM

## 2021-09-23 RX ORDER — OXYCODONE HCL 5 MG/5 ML
5 SOLUTION, ORAL ORAL
Status: DISCONTINUED | OUTPATIENT
Start: 2021-09-23 | End: 2021-09-23 | Stop reason: HOSPADM

## 2021-09-23 RX ORDER — SODIUM CHLORIDE, SODIUM LACTATE, POTASSIUM CHLORIDE, CALCIUM CHLORIDE 600; 310; 30; 20 MG/100ML; MG/100ML; MG/100ML; MG/100ML
INJECTION, SOLUTION INTRAVENOUS CONTINUOUS
Status: DISCONTINUED | OUTPATIENT
Start: 2021-09-23 | End: 2021-09-23 | Stop reason: HOSPADM

## 2021-09-23 RX ADMIN — SODIUM CHLORIDE, POTASSIUM CHLORIDE, SODIUM LACTATE AND CALCIUM CHLORIDE: 600; 310; 30; 20 INJECTION, SOLUTION INTRAVENOUS at 12:01

## 2021-09-23 RX ADMIN — LIDOCAINE HYDROCHLORIDE 20 MG: 20 INJECTION, SOLUTION EPIDURAL; INFILTRATION; INTRACAUDAL at 12:08

## 2021-09-23 ASSESSMENT — PAIN SCALES - GENERAL: PAIN_LEVEL: 0

## 2021-09-23 ASSESSMENT — PAIN DESCRIPTION - PAIN TYPE: TYPE: REFERRED PAIN

## 2021-09-23 ASSESSMENT — FIBROSIS 4 INDEX: FIB4 SCORE: 1.5

## 2021-09-23 NOTE — DISCHARGE INSTRUCTIONS
ACTIVITY: Rest and take it easy for the first 24 hours.  A responsible adult is recommended to remain with you during that time.  It is normal to feel sleepy.  We encourage you to not do anything that requires balance, judgment or coordination.    MILD FLU-LIKE SYMPTOMS ARE NORMAL. YOU MAY EXPERIENCE GENERALIZED MUSCLE ACHES, THROAT IRRITATION, HEADACHE AND/OR SOME NAUSEA.    FOR 24 HOURS DO NOT:  Drive, operate machinery or run household appliances.  Drink beer or alcoholic beverages.   Make important decisions or sign legal documents.    SPECIAL INSTRUCTIONS: ENDOSCOPY HOME CARE INSTRUCTIONS    FABIANA - TRANSESOPHAGEAL ECHOCARDIOGRAM  1. Don't drive or drink alcohol for 24 hours. The medication you received will make you too drowsy.  2. If you begin to vomit bloody material, or develop black or bloody stools, call your doctor as soon as possible.  3. If you have any neck, chest, abdominal pain or temp of 100 degrees, call your doctor.    You should call 911 if you develop problems with breathing or chest pain.  If any questions arise, call your doctor. If your doctor is not available, please feel free to call [phone]. You can also call the [facility] open 24 hours/day, 7 days/week and speak to a nurse at [phone], or toll free [phone].    DIET: To avoid nausea, slowly advance diet as tolerated, avoiding spicy or greasy foods for the first day.  Add more substantial food to your diet according to your physician's instructions. INCREASE FLUIDS AND FIBER TO AVOID CONSTIPATION.    FOLLOW-UP APPOINTMENT:  A follow-up appointment should be arranged with your doctor; call to schedule.    You should CALL YOUR PHYSICIAN if you develop:  Fever greater than 101 degrees F.  Pain not relieved by medication, or persistent nausea or vomiting.  Excessive bleeding (blood soaking through dressing) or unexpected drainage from the wound.  Extreme redness or swelling around the incision site, drainage of pus or  foul smelling drainage.  Inability to urinate or empty your bladder within 8 hours.  Problems with breathing or chest pain.    You should call 911 if you develop problems with breathing or chest pain.  If you are unable to contact your doctor or surgical center, you should go to the nearest emergency room or urgent care center.  Physician's telephone #: Dr. Almaguer, [phone]    If any questions arise, call your doctor.  If your doctor is not available, please feel free to call the Surgical Center at [phone]. The Contact Center is open Monday through Friday 7AM to 5PM and may speak to a nurse at [phone], or toll free at [phone].     A registered nurse may call you a few days after your surgery to see how you are doing after your procedure.    MEDICATIONS: Resume taking daily medication.  Take prescribed pain medication with food.  If no medication is prescribed, you may take non-aspirin pain medication if needed.  PAIN MEDICATION CAN BE VERY CONSTIPATING.  Take a stool softener or laxative such as senokot, pericolace, or milk of magnesia if needed.    If your physician has prescribed pain medication that includes Acetaminophen (Tylenol), do not take additional Acetaminophen (Tylenol) while taking the prescribed medication.    Depression / Suicide Risk    As you are discharged from this [facility] facility, it is important to learn how to keep safe from harming yourself.    Recognize the warning signs:  · Abrupt changes in personality, positive or negative- including increase in energy   · Giving away possessions  · Change in eating patterns- significant weight changes-  positive or negative  · Change in sleeping patterns- unable to sleep or sleeping all the time   · Unwillingness or inability to communicate  · Depression  · Unusual sadness, discouragement and loneliness  · Talk of wanting to die  · Neglect of personal appearance   · Rebelliousness- reckless behavior  · Withdrawal from  people/activities they love  · Confusion- inability to concentrate     If you or a loved one observes any of these behaviors or has concerns about self-harm, here's what you can do:  · Talk about it- your feelings and reasons for harming yourself  · Remove any means that you might use to hurt yourself (examples: pills, rope, extension cords, firearm)  · Get professional help from the community (Mental Health, Substance Abuse, psychological counseling)  · Do not be alone:Call your Safe Contact- someone whom you trust who will be there for you.  · Call your local CRISIS HOTLINE 134-6146 or 756-881-8778  · Call your local Children's Mobile Crisis Response Team [place] (906) 740-8632 or www.[employer]  · Call the toll free National Suicide Prevention Hotlines   · National Suicide Prevention Lifeline 435-395-GOUP (9348)  · National Hope Line Network 800-SUICIDE (918-2146)

## 2021-09-23 NOTE — OR NURSING
Patient arrived to unit at 1334. Patient is AOx4. Transferred to chair appropriately. Patient's pain is 2/10 Patient voiding appropriately. Tolerating liquids at this time. Discharge instructions discussed with the patient. Patient denies any further questions at this time. Patient discharged home to responsible adult at 1348.

## 2021-09-23 NOTE — ANESTHESIA POSTPROCEDURE EVALUATION
Patient: Anaya Browne    Procedure Summary     Date: 09/23/21 Room / Location: [facility] ECHOCARDIOLOGY [facility]    Anesthesia Start: 1201 Anesthesia Stop: 1222    Procedures:       CL-CARDIOVERSION      EC-FABIANA W/O CONT Diagnosis:       Atrial flutter, unspecified type (HCC)      High risk medication use      Type 2 diabetes mellitus with hyperglycemia, without long-term current use of insulin (HCC)      Unspecified atrial flutter      (See Associated Dx)    Scheduled Providers: Anthony Almaguer M.D.; Patito Brito M.D. Responsible Provider: Patito Brito M.D.    Anesthesia Type: MAC ASA Status: 2          Final Anesthesia Type: MAC  Last vitals  BP   Blood Pressure : (Abnormal) 83/51    Temp   36.7 °C (98.1 °F)    Pulse   62   Resp   (Abnormal) 25    SpO2   99 %      Anesthesia Post Evaluation    Patient location during evaluation: PACU  Patient participation: complete - patient participated  Level of consciousness: awake and alert  Pain score: 0    Airway patency: patent  Anesthetic complications: no  Cardiovascular status: hemodynamically stable  Respiratory status: acceptable  Hydration status: euvolemic    PONV: none          No complications documented.     Nurse Pain Score: 0 (NPRS)

## 2021-09-23 NOTE — OR NURSING
1219: received to PACU via gurney with bite block in place. Respirations spontaneous and unlabored. Bite block dc'd upon arrival to PACU without problem or difficulty. Denies pain , nausea or shortness of breath.    1240: 12 lead EKG completed.    1300: 3 ice chips given. Tolerated well. Able to swallow without difficulty or problem.    1310: taking ice chips well without chocking or coughing. Denies numbness on her throat.    1320: report called to Cordelia DAVIS.    1325: c/o slight  sore throat. Taking ice chips very well.    1331: transported via gurney to PACU 2. Stable. Patient wearing face mask.

## 2021-09-23 NOTE — PROCEDURES
Procedure performed: External DC Cardioversion    : Anthony Almaguer MD PhD FACC    Assistant: None    Anesthesia: Per anesthesia services    Indication: Atrial flutter    Preprocedural Diagnosis:   Atrial Flutter  Postprocedural Diagnosis: Sinus Rhythm    Description of procedure:    Ms. Browne was brought to the pre/post procedure area of the cath lab. Informed consent was obtained. Defibrillator pads were placed in the anterior and posterior position.  Adequate sedation was obtained with assistance of anesthesia. A FABIANA performed confirmed that there was NO left atrial thrombus. Patient was successfully cardioverted with 50 J synchronized biphasic energy into sinus rhythm. She was monitored in the recovery area until criteria met and will be discharged home.    Conclusion: Successful DC cardioversion    EBL: None    Complications: None apparent      Electronically signed: Anthony Almaguer MD PhD [facility]  Cardiologist [facility]

## 2021-09-23 NOTE — ANESTHESIA TIME REPORT
Anesthesia Start and Stop Event Times     Date Time Event    9/23/2021 11:55 AM Ready for Procedure    9/23/2021 12:01 PM Anesthesia Start    9/23/2021 12:22 PM Anesthesia Stop        Responsible Staff  09/23/21    Name Role Begin End    Patito Brito M.D. Anesthesiologist 09/23/21 12:01 PM 09/23/21 12:22 PM        Preop Diagnosis (Free Text):  Pre-op Diagnosis             Preop Diagnosis (Codes):    Post op Diagnosis  Atrial flutter (HCC)      Premium Reason  Non-Premium    Comments:

## 2021-09-23 NOTE — ANESTHESIA PREPROCEDURE EVALUATION
Relevant Problems   PULMONARY   (positive) Asthma-COPD overlap syndrome (HCC)      CARDIAC   (positive) Atrial flutter (HCC)      GI   (positive) Gastroesophageal reflux disease       Physical Exam    Airway   Mallampati: II  TM distance: >3 FB  Neck ROM: full       Cardiovascular - normal exam  Rhythm: regular  Rate: normal  (-) murmur  Comments: Ekg Afib   Dental - normal exam           Pulmonary - normal exam  Breath sounds clear to auscultation     Abdominal    Neurological - normal exam         Other findings:             Anesthesia Plan    ASA 2       Plan - MAC             Plan Factors:   Patient was previously instructed to abstain from smoking on day of procedure.  Patient did not smoke on day of procedure.      Induction: intravenous      Pertinent diagnostic labs and testing reviewed    Informed Consent:    Anesthetic plan and risks discussed with patient.

## 2021-09-27 ENCOUNTER — TELEPHONE (OUTPATIENT)
Dept: CARDIOLOGY | Facility: MEDICAL CENTER | Age: 66
End: 2021-09-27

## 2021-09-27 NOTE — TELEPHONE ENCOUNTER
Spoke with patient who states she is having lower back/hip area pain that is slowing improving since cardioversion. Patient also continues to have diarrhea since starting cardiac medications. Patient continues to report fatigue despite being back in NSR.     Average VS  HR 50s  -120/60-75    No bruising that patient can see. She states Tylenol hasn't helped much, so she took a dose of Advil today. Discussed increased risk of bleeding with NSAID usage, so encouraged heat/ice and continued Tylenol usage to help with pain. She verbalizes understanding.

## 2021-09-27 NOTE — TELEPHONE ENCOUNTER
Andres Barrios M.D.  You 5 minutes ago (4:28 PM)     We can try stopping her metoprolol succinate.  Continue flecainide.  If diarrhea does not improve we have to switch her Xarelto to Eliquis.    Message text    You  You; Andres Barrios M.D. 1 hour ago (2:57 PM)     Do you want to adjust meds due to bradycardia with HR 50s and continued fatigue?      ---------------------------------------------------  Called patient and notified of MD recommendations. Med rec updated. She will update us if her symptoms don't improve in the next 1-2 weeks.

## 2021-09-27 NOTE — TELEPHONE ENCOUNTER
----- Message from Berta Montelongo R.N. sent at 2021 10:36 AM PDT -----  Regarding: FW: [facility] PT Question  AK pt, last seen , thank you!  ----- Message -----  From: Virgie Bao  Sent: 2021   8:11 AM PDT  To: Berta Montelongo R.N.  Subject: [facility] PT Question                           AW  TO: Savanah  NM: Anaya Browne   PH: [phone]   PT NM: Anaya Browne   : [birth year]   REG DR: Dr Almaguer   RE: Echo cardiogram and Cardioversion  , has been achy since. Pain is in  arm and shoulder.   DISP HIST: 2021 11:05A 2BA txt  Savanah    Thanks!

## 2021-10-01 PROCEDURE — 93268 ECG RECORD/REVIEW: CPT | Performed by: INTERNAL MEDICINE

## 2021-10-05 ENCOUNTER — OFFICE VISIT (OUTPATIENT)
Dept: CARDIOLOGY | Facility: MEDICAL CENTER | Age: 66
End: 2021-10-05
Payer: MEDICARE

## 2021-10-05 ENCOUNTER — TELEPHONE (OUTPATIENT)
Dept: CARDIOLOGY | Facility: MEDICAL CENTER | Age: 66
End: 2021-10-05

## 2021-10-05 VITALS
RESPIRATION RATE: 14 BRPM | SYSTOLIC BLOOD PRESSURE: 112 MMHG | HEIGHT: 68 IN | HEART RATE: 78 BPM | DIASTOLIC BLOOD PRESSURE: 72 MMHG | WEIGHT: 140.8 LBS | OXYGEN SATURATION: 97 % | BODY MASS INDEX: 21.34 KG/M2

## 2021-10-05 DIAGNOSIS — I48.92 ATRIAL FLUTTER, UNSPECIFIED TYPE (HCC): ICD-10-CM

## 2021-10-05 PROCEDURE — 93000 ELECTROCARDIOGRAM COMPLETE: CPT | Performed by: INTERNAL MEDICINE

## 2021-10-05 PROCEDURE — 99215 OFFICE O/P EST HI 40 MIN: CPT | Mod: 25 | Performed by: INTERNAL MEDICINE

## 2021-10-05 ASSESSMENT — FIBROSIS 4 INDEX: FIB4 SCORE: 1.5

## 2021-10-05 NOTE — TELEPHONE ENCOUNTER
Patient scheduled for flutter ablation on 11-1-21 with Dr. Belcher. Patient has been instructed to check in at 10:30 for 12:30 case time. Hold Metformin am of. Message sent to authorizations. Emailed [facility].

## 2021-10-05 NOTE — PROGRESS NOTES
Arrhythmia Clinic Note (New patient)     DOS: 10/5/2021    Referring physician: JAMIE GARCIA    Chief complaint/Reason for consult: AFL    HPI: 67 y/o F with pAFL on Xarelto and flecainide. Had an episode of sustained atrial flutter recently requiring DCCV in the ED. She would like to be off medications if possible moving forward. No syncope or presyncope, no chest pain or SOB.    ROS (+ highlighted in bold):  Constitutional: Fevers/chills/fatigue/weightloss  HEENT: Blurry vision/eye pain/sore throat/hearing loss  Respiratory: Shortness of breath/cough  Cardiovascular: Chest pain/palpitations/edema/orthopnea/syncope  GI: Nausea/vomitting/diarrhea  MSK: Arthralgias/myagias/muscle weakness  Skin: Rash/sores  Neurological: Numbness/tremors/vertigo  Endocrine: Excessive thirst/polyuria/cold intolerance/heat intolerance  Psych: Depression/anxiety    Past Medical History:   Diagnosis Date   • Atrial fibrillation (HCC)    • Atrial flutter (HCC)    • Back pain    • Chickenpox    • Cough    • Daytime sleepiness    • Depression    • Diabetes (HCC) 09/17/2021    Takes Metformin   • GERD (gastroesophageal reflux disease)    • Hoarseness, persistent    • Influenza    • Mumps    • Shortness of breath    • Skin change    • Sputum production    • Sweat, sweating, excessive    • Wears glasses        Past Surgical History:   Procedure Laterality Date   • HYSTERECTOMY LAPAROSCOPY     • HYSTERECTOMY LAPAROSCOPY     • MAMMOPLASTY AUGMENTATION Bilateral    • OTHER      EGD with Bravo Capsule Placed 8-.       Social History     Socioeconomic History   • Marital status:      Spouse name: Not on file   • Number of children: Not on file   • Years of education: Not on file   • Highest education level: Not on file   Occupational History   • Not on file   Tobacco Use   • Smoking status: Former Smoker     Packs/day: 0.50     Years: 30.00     Pack years: 15.00     Types: Cigarettes     Quit date: 2/1/2012     Years since quitting:  9.6   • Smokeless tobacco: Never Used   Vaping Use   • Vaping Use: Never used   Substance and Sexual Activity   • Alcohol use: Yes     Alcohol/week: 1.5 oz     Types: 3 Glasses of wine per week   • Drug use: Not Currently     Frequency: 2.0 times per week     Types: Marijuana, Oral     Comment: twice a week   • Sexual activity: Not on file   Other Topics Concern   • Not on file   Social History Narrative   • Not on file     Social Determinants of Health     Financial Resource Strain:    • Difficulty of Paying Living Expenses:    Food Insecurity:    • Worried About Running Out of Food in the Last Year:    • Ran Out of Food in the Last Year:    Transportation Needs:    • Lack of Transportation (Medical):    • Lack of Transportation (Non-Medical):    Physical Activity:    • Days of Exercise per Week:    • Minutes of Exercise per Session:    Stress:    • Feeling of Stress :    Social Connections:    • Frequency of Communication with Friends and Family:    • Frequency of Social Gatherings with Friends and Family:    • Attends [employer] Services:    • Active Member of Clubs or Organizations:    • Attends Club or Organization Meetings:    • Marital Status:    Intimate Partner Violence:    • Fear of Current or Ex-Partner:    • Emotionally Abused:    • Physically Abused:    • Sexually Abused:        Family History   Problem Relation Age of Onset   • Breast Cancer Mother         breast cancer 68   • Cancer Mother    • Lung Cancer Father    • Dementia Brother         vascular   • Stroke Brother    • Cancer Maternal Aunt         breast cancer       No Known Allergies    Current Outpatient Medications   Medication Sig Dispense Refill   • estradiol (ESTRACE) 0.1 MG/GM vaginal cream APPLY 1/2 GRAM AROUND THE VULVA/URETHRA TWICE A WEEK     • flecainide (TAMBOCOR) 50 MG tablet Take 1 Tablet by mouth 2 times a day. 180 Tablet 2   • rivaroxaban (XARELTO) 20 MG Tab tablet Take 1 Tablet by mouth with dinner. 90 Tablet 3   • montelukast  "(SINGULAIR) 10 MG Tab Take 1 Tab by mouth every bedtime.     • L-THEANINE  mg every day.     • magnesium oxide (MAG-OX) 400 MG Tab Take 400 mg by mouth every day.     • ALPRAZolam (XANAX) 0.5 MG Tab Take 0.25 mg by mouth at bedtime as needed.     • metformin (GLUCOPHAGE) 500 MG Tab Take 500 mg by mouth 2 times a day, with meals.     • cetirizine (KLS ALLER-DANYELLE) 10 MG Tab Take 10 mg by mouth every day.       No current facility-administered medications for this visit.       Physical Exam:  Vitals:    10/05/21 0730   BP: 112/72   BP Location: Left arm   Patient Position: Sitting   BP Cuff Size: Adult   Pulse: 78   Resp: 14   SpO2: 97%   Weight: 63.9 kg (140 lb 12.8 oz)   Height: 1.727 m (5' 8\")     General appearance: NAD, conversant   Eyes: anicteric sclerae, moist conjunctivae; no lid-lag; PERRLA  HENT: Atraumatic; oropharynx clear with moist mucous membranes and no mucosal ulcerations; normal hard and soft palate  Neck: Trachea midline; FROM, supple, no thyromegaly or lymphadenopathy  Lungs: CTA, with normal respiratory effort and no intercostal retractions  CV: RRR, no MRGs, no JVD   Abdomen: Soft, non-tender; no masses or HSM  Extremities: No peripheral edema or extremity lymphadenopathy  Skin: Normal temperature, turgor and texture; no rash, ulcers or subcutaneous nodules  Psych: Appropriate affect, alert and oriented to person, place and time    Data:  Lipids:   Lab Results   Component Value Date/Time    CHOLSTRLTOT 215 (H) 06/24/2021 06:32 AM    TRIGLYCERIDE 117 06/24/2021 06:32 AM    HDL 57 06/24/2021 06:32 AM     (H) 06/24/2021 06:32 AM        BMP:  Lab Results   Component Value Date/Time    SODIUM 144 09/20/2021 0801    POTASSIUM 4.6 09/20/2021 0801    CHLORIDE 106 09/20/2021 0801    CO2 28 09/20/2021 0801    GLUCOSE 64 (L) 09/20/2021 0801    BUN 23 (H) 09/20/2021 0801    CREATININE 1.18 09/20/2021 0801    CALCIUM 9.9 09/20/2021 0801    ANION 10.0 09/20/2021 0801        TSH:   Lab Results "   Component Value Date/Time    TSHULTRASEN 2.180 09/16/2021 0932        THYROXINE (T4):   No results found for: FREEDIR     CBC:   Lab Results   Component Value Date/Time    WBC 6.8 09/20/2021 08:01 AM    RBC 5.06 09/20/2021 08:01 AM    HEMOGLOBIN 15.8 09/20/2021 08:01 AM    HEMATOCRIT 48.8 (H) 09/20/2021 08:01 AM    MCV 96.4 09/20/2021 08:01 AM    MCH 31.2 09/20/2021 08:01 AM    MCHC 32.4 (L) 09/20/2021 08:01 AM    RDW 45.7 09/20/2021 08:01 AM    PLATELETCT 249 09/20/2021 08:01 AM    MPV 10.5 09/20/2021 08:01 AM    NEUTSPOLYS 53.30 06/24/2021 06:32 AM    LYMPHOCYTES 32.30 06/24/2021 06:32 AM    MONOCYTES 7.30 06/24/2021 06:32 AM    EOSINOPHILS 6.00 06/24/2021 06:32 AM    BASOPHILS 0.90 06/24/2021 06:32 AM    IMMGRAN 0.20 06/24/2021 06:32 AM    IMMGRAN 0 02/02/2017 06:48 AM    NRBC 0.00 06/24/2021 06:32 AM    NEUTS 2.50 06/24/2021 06:32 AM    NEUTS 2.8 02/02/2017 06:48 AM    LYMPHS 1.51 06/24/2021 06:32 AM    LYMPHS 1.5 02/02/2017 06:48 AM    MONOS 0.34 06/24/2021 06:32 AM    MONOS 0.3 02/02/2017 06:48 AM    EOS 0.28 06/24/2021 06:32 AM    EOS 0.2 02/02/2017 06:48 AM    BASO 0.04 06/24/2021 06:32 AM    BASO 0.1 02/02/2017 06:48 AM    IMMGRANAB 0.01 06/24/2021 06:32 AM    IMMGRANAB 0.0 02/02/2017 06:48 AM    NRBCAB 0.00 06/24/2021 06:32 AM        CBC w/o DIFF  Lab Results   Component Value Date/Time    WBC 6.8 09/20/2021 08:01 AM    RBC 5.06 09/20/2021 08:01 AM    HEMOGLOBIN 15.8 09/20/2021 08:01 AM    MCV 96.4 09/20/2021 08:01 AM    MCH 31.2 09/20/2021 08:01 AM    MCHC 32.4 (L) 09/20/2021 08:01 AM    RDW 45.7 09/20/2021 08:01 AM    MPV 10.5 09/20/2021 08:01 AM       Prior echo/stress results reviewed: No NUVIA thrombus    EKG interpreted by me: RAHEEL Kirby monitor reviewed: pAFL, pSVT, ? Afib    Impression/Plan:  1. Atrial flutter, unspecified type (HCC)  EKG    CL-EP ABLATION ATRIAL FLUTTER     1. Typical atrial flutter  2. pSVT  3. pAF possibly  4. OAC use, high risk medication  5. Flecianide use, high risk  medication    - Discussed treatment options. Pt strongly prefers ablation.  - Risks include vascular access bleeding or pain, infection, stroke, myocardial infarction, cardiac tamponade, pericardial effusion, myocardial perforation, major bleeding, phrenic nerve damage, heart block requiring a pacemaker, and death. Risk of major adverse event is ~1%. Success rates long term are 80-95% depending on the arrhythmia induced and the acute success in the EP lab.  - After review of her Biotel her arrhythmia seems more consistent with AFL than Afib. Will plan for flutter ablation, +/- SVT ablation. Possible that she develops Afib moving forward, can be addressed if so.  - Continue Xarelto    Plan AFL/SVT ablation next available. Will plan to likely stop OAC afterwards. Consider repeat ECG monitoring to screen of Afib in the future.      Junior Belcher MD  Cardiac Electrophysiology

## 2021-10-05 NOTE — TELEPHONE ENCOUNTER
----- Message from Junior Belcher M.D. sent at 10/5/2021  1:35 PM PDT -----  Please schedule flutter ablation, no meds to hold, thanks    MC

## 2021-10-05 NOTE — TELEPHONE ENCOUNTER
[place], [place]                                      OPERATIVE REPORT      NAME:            TREVIN BIRMINGHAM                   AGE:  45   ACCT#:           [id number]                              :  1971   MR#:             [id number]                             ROOM:     ADMIT DATE:      2017                        DIS DATE:  2017   PT TYPE:         D                                       DP:  [id number]   ATTENDING:       LUKE LOZANO MD                                 DICTATING PHYSICIAN:  LUKE LOZANO MD      DATE OF OPERATION:         SURGEON:  Luke Lozano MD            PREOPERATIVE DIAGNOSIS:  Retinal detachment, left eye.      POSTOPERATIVE DIAGNOSIS:  Retinal detachment, left eye.      PROCEDURE PERFORMED:  Vitrectomy, gas-fluid exchange, endolaser   photocoagulation, and cryoretinopexy.      ANESTHESIA:  General with endotracheal intubation.      DESCRIPTION OF PROCEDURE:  The patient was taken to the operating room,   following induction of general anesthesia, sterilely prepped and draped in usual   fashion.  Lid speculum was placed.  A 23-gauge trocar was placed in the   inferotemporal quadrant, 3.5 mm posterior to the limbus.  The infusion cannula   was inserted, inspected by direct visualization, was found to be in good   position.  Infusion was turned on.  Trocars were also placed at the 10 o'clock,   2 o'clock positions, 3.5 mm posterior to limbus.  The endoilluminator and   vitreous cutter were inserted.  Vitrectomy was begun initially in a core fashion   and subsequently removing much of the peripheral vitreous as possible.  Scleral   depression was performed for 360 degrees to remove as much of the peripheral   vitreous base.  An air-fluid exchange was performed.  Cryoretinopexy was applied   around the retinal tear and then endolaser photocoagulation was applied for 360  ----- Message from Junior Belcher M.D. sent at 10/5/2021  1:35 PM PDT -----  Please schedule flutter ablation, no meds to hold, thanks    MC       degrees in the retinal periphery.  The air was exchanged for 15% C3F8 gas.   Trocars were removed.  Ancef, dexamethasone, and 0.25% Marcaine were injected   subconjunctivally around the sclerotomies.  Atropine drops and Maxitrol ointment   were applied.  The eye was patched and shielded.  The patient extubated and   returned to recovery in good condition.            ______________________________   MD ERIC Guy/Juan   DP:  [id number]   DD:  01/10/2017 08:28:49   DT:  01/10/2017 08:48:02   Job #:  [id number]

## 2021-10-05 NOTE — TELEPHONE ENCOUNTER
Dr. Belcher,    While talking to this patient, she stated that you oked her coming off of her Xarelto and Flecainde as long as it was 30 days post cardioversion. That would have her coming off of those medications right before the ablation on 11-1-21. Just wanted to confirm that you are ok with that plan.    Please advise.    Thank You,  Patsy

## 2021-10-06 NOTE — TELEPHONE ENCOUNTER
Junior Belcher M.D.  Patsy Ta, Med Ass't  Caller: Unspecified (Yesterday,  1:58 PM)  Yes that is correct I am OK with that.

## 2021-10-11 ENCOUNTER — HOSPITAL ENCOUNTER (OUTPATIENT)
Dept: RADIOLOGY | Facility: MEDICAL CENTER | Age: 66
End: 2021-10-11
Attending: CHIROPRACTOR
Payer: MEDICARE

## 2021-10-11 DIAGNOSIS — M99.03 SOMATIC DYSFUNCTION OF LUMBAR REGION: ICD-10-CM

## 2021-10-11 DIAGNOSIS — M25.552 LEFT HIP PAIN: ICD-10-CM

## 2021-10-11 DIAGNOSIS — M99.01 CERVICAL SEGMENT DYSFUNCTION: ICD-10-CM

## 2021-10-11 DIAGNOSIS — M99.04 SOMATIC DYSFUNCTION OF SACRAL REGION: ICD-10-CM

## 2021-10-11 DIAGNOSIS — M99.02 THORACIC SEGMENT DYSFUNCTION: ICD-10-CM

## 2021-10-11 LAB — EKG IMPRESSION: NORMAL

## 2021-10-11 PROCEDURE — 72040 X-RAY EXAM NECK SPINE 2-3 VW: CPT

## 2021-10-11 PROCEDURE — 72170 X-RAY EXAM OF PELVIS: CPT

## 2021-10-11 PROCEDURE — 73502 X-RAY EXAM HIP UNI 2-3 VIEWS: CPT | Mod: LT

## 2021-10-11 PROCEDURE — 72072 X-RAY EXAM THORAC SPINE 3VWS: CPT

## 2021-10-11 PROCEDURE — 72100 X-RAY EXAM L-S SPINE 2/3 VWS: CPT

## 2021-10-28 ENCOUNTER — PRE-ADMISSION TESTING (OUTPATIENT)
Dept: ADMISSIONS | Facility: MEDICAL CENTER | Age: 66
End: 2021-10-28
Attending: INTERNAL MEDICINE
Payer: MEDICARE

## 2021-10-28 DIAGNOSIS — Z01.810 PRE-OPERATIVE CARDIOVASCULAR EXAMINATION: ICD-10-CM

## 2021-10-28 DIAGNOSIS — Z01.812 PRE-OPERATIVE LABORATORY EXAMINATION: ICD-10-CM

## 2021-10-28 LAB
ALBUMIN SERPL BCP-MCNC: 4.6 G/DL (ref 3.2–4.9)
ALBUMIN/GLOB SERPL: 1.9 G/DL
ALP SERPL-CCNC: 77 U/L (ref 30–99)
ALT SERPL-CCNC: 31 U/L (ref 2–50)
ANION GAP SERPL CALC-SCNC: 10 MMOL/L (ref 7–16)
AST SERPL-CCNC: 24 U/L (ref 12–45)
BASOPHILS # BLD AUTO: 0.8 % (ref 0–1.8)
BASOPHILS # BLD: 0.07 K/UL (ref 0–0.12)
BILIRUB SERPL-MCNC: 0.3 MG/DL (ref 0.1–1.5)
BUN SERPL-MCNC: 21 MG/DL (ref 8–22)
CALCIUM SERPL-MCNC: 9.9 MG/DL (ref 8.5–10.5)
CHLORIDE SERPL-SCNC: 106 MMOL/L (ref 96–112)
CO2 SERPL-SCNC: 28 MMOL/L (ref 20–33)
CREAT SERPL-MCNC: 0.78 MG/DL (ref 0.5–1.4)
EKG IMPRESSION: NORMAL
EOSINOPHIL # BLD AUTO: 0.24 K/UL (ref 0–0.51)
EOSINOPHIL NFR BLD: 2.6 % (ref 0–6.9)
ERYTHROCYTE [DISTWIDTH] IN BLOOD BY AUTOMATED COUNT: 47.6 FL (ref 35.9–50)
GLOBULIN SER CALC-MCNC: 2.4 G/DL (ref 1.9–3.5)
GLUCOSE SERPL-MCNC: 108 MG/DL (ref 65–99)
HCT VFR BLD AUTO: 46 % (ref 37–47)
HGB BLD-MCNC: 15 G/DL (ref 12–16)
IMM GRANULOCYTES # BLD AUTO: 0.05 K/UL (ref 0–0.11)
IMM GRANULOCYTES NFR BLD AUTO: 0.6 % (ref 0–0.9)
INR PPP: 0.94 (ref 0.87–1.13)
LYMPHOCYTES # BLD AUTO: 1.74 K/UL (ref 1–4.8)
LYMPHOCYTES NFR BLD: 19.2 % (ref 22–41)
MCH RBC QN AUTO: 31.6 PG (ref 27–33)
MCHC RBC AUTO-ENTMCNC: 32.6 G/DL (ref 33.6–35)
MCV RBC AUTO: 96.8 FL (ref 81.4–97.8)
MONOCYTES # BLD AUTO: 0.51 K/UL (ref 0–0.85)
MONOCYTES NFR BLD AUTO: 5.6 % (ref 0–13.4)
NEUTROPHILS # BLD AUTO: 6.46 K/UL (ref 2–7.15)
NEUTROPHILS NFR BLD: 71.2 % (ref 44–72)
NRBC # BLD AUTO: 0 K/UL
NRBC BLD-RTO: 0 /100 WBC
PLATELET # BLD AUTO: 213 K/UL (ref 164–446)
PMV BLD AUTO: 10.2 FL (ref 9–12.9)
POTASSIUM SERPL-SCNC: 4.9 MMOL/L (ref 3.6–5.5)
PROT SERPL-MCNC: 7 G/DL (ref 6–8.2)
PROTHROMBIN TIME: 12.3 SEC (ref 12–14.6)
RBC # BLD AUTO: 4.75 M/UL (ref 4.2–5.4)
SARS-COV-2 RNA RESP QL NAA+PROBE: NOTDETECTED
SODIUM SERPL-SCNC: 144 MMOL/L (ref 135–145)
SPECIMEN SOURCE: NORMAL
WBC # BLD AUTO: 9.1 K/UL (ref 4.8–10.8)

## 2021-10-28 PROCEDURE — C9803 HOPD COVID-19 SPEC COLLECT: HCPCS

## 2021-10-28 PROCEDURE — 80053 COMPREHEN METABOLIC PANEL: CPT

## 2021-10-28 PROCEDURE — U0003 INFECTIOUS AGENT DETECTION BY NUCLEIC ACID (DNA OR RNA); SEVERE ACUTE RESPIRATORY SYNDROME CORONAVIRUS 2 (SARS-COV-2) (CORONAVIRUS DISEASE [COVID-19]), AMPLIFIED PROBE TECHNIQUE, MAKING USE OF HIGH THROUGHPUT TECHNOLOGIES AS DESCRIBED BY CMS-2020-01-R: HCPCS

## 2021-10-28 PROCEDURE — 36415 COLL VENOUS BLD VENIPUNCTURE: CPT

## 2021-10-28 PROCEDURE — 93010 ELECTROCARDIOGRAM REPORT: CPT | Performed by: INTERNAL MEDICINE

## 2021-10-28 PROCEDURE — 85610 PROTHROMBIN TIME: CPT

## 2021-10-28 PROCEDURE — 93005 ELECTROCARDIOGRAM TRACING: CPT

## 2021-10-28 PROCEDURE — 85025 COMPLETE CBC W/AUTO DIFF WBC: CPT

## 2021-10-28 PROCEDURE — U0005 INFEC AGEN DETEC AMPLI PROBE: HCPCS

## 2021-10-28 ASSESSMENT — FIBROSIS 4 INDEX: FIB4 SCORE: 1.5

## 2021-11-01 ENCOUNTER — HOSPITAL ENCOUNTER (OUTPATIENT)
Facility: MEDICAL CENTER | Age: 66
End: 2021-11-01
Attending: INTERNAL MEDICINE | Admitting: INTERNAL MEDICINE
Payer: MEDICARE

## 2021-11-01 ENCOUNTER — APPOINTMENT (OUTPATIENT)
Dept: CARDIOLOGY | Facility: MEDICAL CENTER | Age: 66
End: 2021-11-01
Attending: INTERNAL MEDICINE
Payer: MEDICARE

## 2021-11-01 ENCOUNTER — ANESTHESIA (OUTPATIENT)
Dept: CARDIOLOGY | Facility: MEDICAL CENTER | Age: 66
End: 2021-11-01
Payer: MEDICARE

## 2021-11-01 ENCOUNTER — ANESTHESIA EVENT (OUTPATIENT)
Dept: CARDIOLOGY | Facility: MEDICAL CENTER | Age: 66
End: 2021-11-01
Payer: MEDICARE

## 2021-11-01 VITALS
SYSTOLIC BLOOD PRESSURE: 142 MMHG | TEMPERATURE: 98 F | HEART RATE: 86 BPM | OXYGEN SATURATION: 95 % | BODY MASS INDEX: 21.05 KG/M2 | RESPIRATION RATE: 16 BRPM | HEIGHT: 68 IN | WEIGHT: 138.89 LBS | DIASTOLIC BLOOD PRESSURE: 74 MMHG

## 2021-11-01 DIAGNOSIS — I48.92 ATRIAL FLUTTER, UNSPECIFIED TYPE (HCC): ICD-10-CM

## 2021-11-01 LAB
EKG IMPRESSION: NORMAL
GLUCOSE BLD-MCNC: 101 MG/DL (ref 65–99)

## 2021-11-01 PROCEDURE — 93662 INTRACARDIAC ECG (ICE): CPT | Mod: 26 | Performed by: INTERNAL MEDICINE

## 2021-11-01 PROCEDURE — 93623 PRGRMD STIMJ&PACG IV RX NFS: CPT | Mod: 26 | Performed by: INTERNAL MEDICINE

## 2021-11-01 PROCEDURE — 700105 HCHG RX REV CODE 258: Performed by: INTERNAL MEDICINE

## 2021-11-01 PROCEDURE — 93621 COMP EP EVL L PAC&REC C SINS: CPT | Mod: 26 | Performed by: INTERNAL MEDICINE

## 2021-11-01 PROCEDURE — 93613 INTRACARDIAC EPHYS 3D MAPG: CPT | Performed by: INTERNAL MEDICINE

## 2021-11-01 PROCEDURE — 700101 HCHG RX REV CODE 250: Performed by: EMERGENCY MEDICINE

## 2021-11-01 PROCEDURE — 93653 COMPRE EP EVAL TX SVT: CPT

## 2021-11-01 PROCEDURE — 93010 ELECTROCARDIOGRAM REPORT: CPT | Performed by: INTERNAL MEDICINE

## 2021-11-01 PROCEDURE — 82962 GLUCOSE BLOOD TEST: CPT

## 2021-11-01 PROCEDURE — 93005 ELECTROCARDIOGRAM TRACING: CPT | Performed by: INTERNAL MEDICINE

## 2021-11-01 PROCEDURE — 160002 HCHG RECOVERY MINUTES (STAT)

## 2021-11-01 PROCEDURE — 700111 HCHG RX REV CODE 636 W/ 250 OVERRIDE (IP)

## 2021-11-01 PROCEDURE — 700101 HCHG RX REV CODE 250

## 2021-11-01 PROCEDURE — 93653 COMPRE EP EVAL TX SVT: CPT | Performed by: INTERNAL MEDICINE

## 2021-11-01 PROCEDURE — 700111 HCHG RX REV CODE 636 W/ 250 OVERRIDE (IP): Performed by: EMERGENCY MEDICINE

## 2021-11-01 RX ORDER — SODIUM CHLORIDE, SODIUM LACTATE, POTASSIUM CHLORIDE, CALCIUM CHLORIDE 600; 310; 30; 20 MG/100ML; MG/100ML; MG/100ML; MG/100ML
INJECTION, SOLUTION INTRAVENOUS CONTINUOUS
Status: ACTIVE | OUTPATIENT
Start: 2021-11-01 | End: 2021-11-01

## 2021-11-01 RX ORDER — MEPERIDINE HYDROCHLORIDE 25 MG/ML
6.25 INJECTION INTRAMUSCULAR; INTRAVENOUS; SUBCUTANEOUS
Status: DISCONTINUED | OUTPATIENT
Start: 2021-11-01 | End: 2021-11-01 | Stop reason: HOSPADM

## 2021-11-01 RX ORDER — ISOPROTERENOL HYDROCHLORIDE 0.2 MG/ML
INJECTION, SOLUTION INTRAVENOUS
Status: COMPLETED
Start: 2021-11-01 | End: 2021-11-01

## 2021-11-01 RX ORDER — MIDAZOLAM HYDROCHLORIDE 1 MG/ML
INJECTION INTRAMUSCULAR; INTRAVENOUS
Status: DISCONTINUED
Start: 2021-11-01 | End: 2021-11-01 | Stop reason: HOSPADM

## 2021-11-01 RX ORDER — LIDOCAINE HYDROCHLORIDE 20 MG/ML
INJECTION, SOLUTION EPIDURAL; INFILTRATION; INTRACAUDAL; PERINEURAL PRN
Status: DISCONTINUED | OUTPATIENT
Start: 2021-11-01 | End: 2021-11-01 | Stop reason: SURG

## 2021-11-01 RX ORDER — HYDROMORPHONE HYDROCHLORIDE 1 MG/ML
0.4 INJECTION, SOLUTION INTRAMUSCULAR; INTRAVENOUS; SUBCUTANEOUS
Status: DISCONTINUED | OUTPATIENT
Start: 2021-11-01 | End: 2021-11-01 | Stop reason: HOSPADM

## 2021-11-01 RX ORDER — LIDOCAINE HYDROCHLORIDE 20 MG/ML
INJECTION, SOLUTION INFILTRATION; PERINEURAL
Status: COMPLETED
Start: 2021-11-01 | End: 2021-11-01

## 2021-11-01 RX ORDER — DEXAMETHASONE SODIUM PHOSPHATE 4 MG/ML
INJECTION, SOLUTION INTRA-ARTICULAR; INTRALESIONAL; INTRAMUSCULAR; INTRAVENOUS; SOFT TISSUE PRN
Status: DISCONTINUED | OUTPATIENT
Start: 2021-11-01 | End: 2021-11-01 | Stop reason: SURG

## 2021-11-01 RX ORDER — BUPIVACAINE HYDROCHLORIDE 2.5 MG/ML
INJECTION, SOLUTION EPIDURAL; INFILTRATION; INTRACAUDAL
Status: COMPLETED
Start: 2021-11-01 | End: 2021-11-01

## 2021-11-01 RX ORDER — HYDROMORPHONE HYDROCHLORIDE 1 MG/ML
0.1 INJECTION, SOLUTION INTRAMUSCULAR; INTRAVENOUS; SUBCUTANEOUS
Status: DISCONTINUED | OUTPATIENT
Start: 2021-11-01 | End: 2021-11-01 | Stop reason: HOSPADM

## 2021-11-01 RX ORDER — ONDANSETRON 2 MG/ML
4 INJECTION INTRAMUSCULAR; INTRAVENOUS
Status: DISCONTINUED | OUTPATIENT
Start: 2021-11-01 | End: 2021-11-01 | Stop reason: HOSPADM

## 2021-11-01 RX ORDER — LABETALOL HYDROCHLORIDE 5 MG/ML
5 INJECTION, SOLUTION INTRAVENOUS
Status: DISCONTINUED | OUTPATIENT
Start: 2021-11-01 | End: 2021-11-01 | Stop reason: HOSPADM

## 2021-11-01 RX ORDER — HYDROMORPHONE HYDROCHLORIDE 1 MG/ML
0.2 INJECTION, SOLUTION INTRAMUSCULAR; INTRAVENOUS; SUBCUTANEOUS
Status: DISCONTINUED | OUTPATIENT
Start: 2021-11-01 | End: 2021-11-01 | Stop reason: HOSPADM

## 2021-11-01 RX ORDER — DIPHENHYDRAMINE HYDROCHLORIDE 50 MG/ML
12.5 INJECTION INTRAMUSCULAR; INTRAVENOUS
Status: DISCONTINUED | OUTPATIENT
Start: 2021-11-01 | End: 2021-11-01 | Stop reason: HOSPADM

## 2021-11-01 RX ORDER — HEPARIN SODIUM 200 [USP'U]/100ML
INJECTION, SOLUTION INTRAVENOUS
Status: COMPLETED
Start: 2021-11-01 | End: 2021-11-01

## 2021-11-01 RX ORDER — OXYCODONE HYDROCHLORIDE AND ACETAMINOPHEN 5; 325 MG/1; MG/1
2 TABLET ORAL
Status: DISCONTINUED | OUTPATIENT
Start: 2021-11-01 | End: 2021-11-01 | Stop reason: HOSPADM

## 2021-11-01 RX ORDER — HEPARIN SODIUM 200 [USP'U]/100ML
INJECTION, SOLUTION INTRAVENOUS
Status: DISCONTINUED
Start: 2021-11-01 | End: 2021-11-01 | Stop reason: HOSPADM

## 2021-11-01 RX ORDER — OXYCODONE HYDROCHLORIDE AND ACETAMINOPHEN 5; 325 MG/1; MG/1
1 TABLET ORAL
Status: DISCONTINUED | OUTPATIENT
Start: 2021-11-01 | End: 2021-11-01 | Stop reason: HOSPADM

## 2021-11-01 RX ORDER — ONDANSETRON 2 MG/ML
INJECTION INTRAMUSCULAR; INTRAVENOUS PRN
Status: DISCONTINUED | OUTPATIENT
Start: 2021-11-01 | End: 2021-11-01 | Stop reason: SURG

## 2021-11-01 RX ORDER — HEPARIN SODIUM 1000 [USP'U]/ML
INJECTION, SOLUTION INTRAVENOUS; SUBCUTANEOUS
Status: COMPLETED
Start: 2021-11-01 | End: 2021-11-01

## 2021-11-01 RX ORDER — HYDRALAZINE HYDROCHLORIDE 20 MG/ML
5 INJECTION INTRAMUSCULAR; INTRAVENOUS
Status: DISCONTINUED | OUTPATIENT
Start: 2021-11-01 | End: 2021-11-01 | Stop reason: HOSPADM

## 2021-11-01 RX ORDER — SODIUM CHLORIDE, SODIUM LACTATE, POTASSIUM CHLORIDE, AND CALCIUM CHLORIDE .6; .31; .03; .02 G/100ML; G/100ML; G/100ML; G/100ML
500 INJECTION, SOLUTION INTRAVENOUS ONCE
Status: DISCONTINUED | OUTPATIENT
Start: 2021-11-01 | End: 2021-11-01 | Stop reason: HOSPADM

## 2021-11-01 RX ORDER — HALOPERIDOL 5 MG/ML
1 INJECTION INTRAMUSCULAR
Status: DISCONTINUED | OUTPATIENT
Start: 2021-11-01 | End: 2021-11-01 | Stop reason: HOSPADM

## 2021-11-01 RX ADMIN — ROCURONIUM BROMIDE 50 MG: 10 INJECTION, SOLUTION INTRAVENOUS at 12:17

## 2021-11-01 RX ADMIN — HEPARIN SODIUM 4000 UNITS: 200 INJECTION, SOLUTION INTRAVENOUS at 12:27

## 2021-11-01 RX ADMIN — SODIUM CHLORIDE, POTASSIUM CHLORIDE, SODIUM LACTATE AND CALCIUM CHLORIDE: 600; 310; 30; 20 INJECTION, SOLUTION INTRAVENOUS at 12:06

## 2021-11-01 RX ADMIN — DEXAMETHASONE SODIUM PHOSPHATE 4 MG: 4 INJECTION, SOLUTION INTRA-ARTICULAR; INTRALESIONAL; INTRAMUSCULAR; INTRAVENOUS; SOFT TISSUE at 12:40

## 2021-11-01 RX ADMIN — FENTANYL CITRATE 50 MCG: 50 INJECTION, SOLUTION INTRAMUSCULAR; INTRAVENOUS at 12:16

## 2021-11-01 RX ADMIN — ROCURONIUM BROMIDE 20 MG: 10 INJECTION, SOLUTION INTRAVENOUS at 12:54

## 2021-11-01 RX ADMIN — PROPOFOL 50 MG: 10 INJECTION, EMULSION INTRAVENOUS at 13:36

## 2021-11-01 RX ADMIN — BUPIVACAINE HYDROCHLORIDE: 2.5 INJECTION, SOLUTION EPIDURAL; INFILTRATION; INTRACAUDAL; PERINEURAL at 12:27

## 2021-11-01 RX ADMIN — LIDOCAINE HYDROCHLORIDE 100 MG: 20 INJECTION, SOLUTION EPIDURAL; INFILTRATION; INTRACAUDAL at 12:16

## 2021-11-01 RX ADMIN — LIDOCAINE HYDROCHLORIDE: 20 INJECTION, SOLUTION INFILTRATION; PERINEURAL at 12:27

## 2021-11-01 RX ADMIN — PROPOFOL 100 MG: 10 INJECTION, EMULSION INTRAVENOUS at 12:17

## 2021-11-01 RX ADMIN — ISOPROTERENOL HYDROCHLORIDE 200 MCG: 0.2 INJECTION, SOLUTION INTRAMUSCULAR; INTRAVENOUS at 12:37

## 2021-11-01 RX ADMIN — FENTANYL CITRATE 50 MCG: 50 INJECTION, SOLUTION INTRAMUSCULAR; INTRAVENOUS at 12:30

## 2021-11-01 RX ADMIN — SUGAMMADEX 200 MG: 100 INJECTION, SOLUTION INTRAVENOUS at 13:42

## 2021-11-01 RX ADMIN — ONDANSETRON 4 MG: 2 INJECTION INTRAMUSCULAR; INTRAVENOUS at 13:29

## 2021-11-01 ASSESSMENT — FIBROSIS 4 INDEX: FIB4 SCORE: 1.32

## 2021-11-01 ASSESSMENT — PAIN DESCRIPTION - PAIN TYPE
TYPE: ACUTE PAIN

## 2021-11-01 ASSESSMENT — PAIN SCALES - GENERAL: PAIN_LEVEL: 0

## 2021-11-01 NOTE — OR NURSING
1626 Pt arrived to phase 2 area, pt's vss, pt c/o no pain.   1634 pt's  at bedside.   1651 pt discharged with . Discharge instructions reviewed with  and pt. PIV removed.

## 2021-11-01 NOTE — OR NURSING
Pt attempted to void on bedpan and was unsuccessful. Bladder scan showed 650cc.   Bedrest up at 1540, Dr. Ye swenson in PACU and states pt may ambulate and void with stitch in right groin.   Once stitch is removed, pt needs to ambulate again. If groin remains free from bleeding she may transfer to phase II.     1550: Pt up to bedside commode and voided 550cc.     1557: Jessica at bedside removing stitch.     1615: Pt ambulated around PACU w/o difficulty. Groin soft, CDI. Report called to Maris and pt transferred to Phase II.

## 2021-11-01 NOTE — ANESTHESIA POSTPROCEDURE EVALUATION
Patient: Anaya Browne    Procedure Summary     Date: 11/01/21 Room / Location: [facility] CATH [facility]    Anesthesia Start: 1206 Anesthesia Stop: 1359    Procedure: CL-EP ABLATION ATRIAL FLUTTER Diagnosis:       Atrial flutter, unspecified type (HCC)      Unspecified atrial flutter      (See Associated Dx)    Scheduled Providers: Junior Belcher M.D.; Chang Cisse M.D. Responsible Provider: Chang Cisse M.D.    Anesthesia Type: general ASA Status: 2          Final Anesthesia Type: general  Last vitals  BP   Blood Pressure : 135/74    Temp   36.4 °C (97.6 °F)    Pulse   71   Resp   18    SpO2   97 %      Anesthesia Post Evaluation    Patient location during evaluation: PACU  Patient participation: complete - patient participated  Level of consciousness: awake and alert  Pain score: 0    Airway patency: patent  Anesthetic complications: no  Cardiovascular status: hemodynamically stable  Respiratory status: acceptable  Hydration status: euvolemic    PONV: none          No complications documented.     Nurse Pain Score: 0 (NPRS)

## 2021-11-01 NOTE — ANESTHESIA TIME REPORT
Anesthesia Start and Stop Event Times     Date Time Event    11/1/2021 1153 Ready for Procedure     1206 Anesthesia Start     1359 Anesthesia Stop        Responsible Staff  11/01/21    Name Role Begin End    Chang Cisse M.D. Anesth 1206 1359        Preop Diagnosis (Free Text):  Pre-op Diagnosis             Preop Diagnosis (Codes):    Premium Reason  Non-Premium    Comments:

## 2021-11-01 NOTE — DISCHARGE INSTRUCTIONS
[facility] Post Ablation Patient Instructions:  1. No lifting more than 10 lbs for 1 week.      2. No soaking in baths, hot tubs, pools x 1 week.  May shower the day after discharge and take off groin dressings and leave  sites uncovered.  Continue to monitor sites daily for warmth, redness, discolored drainage.  It is common to have a small lump in the area where the cather was (usually the size of a marble); this will go away but takes approximately 6 weeks to normalize.     3.   Please take all medications as prescribed to you; please do not stop any medications prescribed post ablation unless directed by your healthcare provider.      4.   Please do not miss any doses of your blood thinner (if you have been started on, or take chronic blood thinners) without discussion with your healthcare provider first.     5.   Please walk and take deep breaths after discharge.  After discharge, if you experience neurological changes/signs of stroke or high fever you should be seen in the emergency dept.     6.   It is possible you may experience some chest discomfort or chest tightness post ablation.  This is usually secondary to inflammation and irritation of the tissues at the area of the ablation.  If this occurs, it is advised to try 400 mg of Ibuprofen with food as needed up to three times a day for a maximum of two days.  This should help to decrease pain and tissue inflammation.          **Please notify the office ([phone]) if this occurs.         ** DO NOT TAKE Ibuprofen IF HISTORY OF ALLERGY, SIGNIFICANT BLEEDING OR KIDNEY DISEASE WITHOUT DISCUSSING WITH YOUR CARDIOLOGY PROVIDER FIRST.          ** If pain becomes severe or you have additional symptoms you may need to be medically evaluated; please contact the cardiology office ([phone]) for further direction.     7. It is possible that you may experience arrhythmia/Atrial Fibrillation post ablation.  This is secondary to  irritation and inflammation of the cardiac tissues from the ablation.  If you have atrial fibrillation all day or feel poorly with it, please notify your cardiologist's via phone ([phone]) or [email or web address].      8.  Please contact call our office ([phone]) or message via [employer] message if you have any questions or concerns post procedurally.    9. You need to be seen for post ablation follow up 3-4 weeks post procedure. An appointment is scheduled for you.  Please contact the office ([phone]) if you need to change your appointment.      ACTIVITY: Rest and take it easy for the first 24 hours.  A responsible adult is recommended to remain with you during that time.  It is normal to feel sleepy.  We encourage you to not do anything that requires balance, judgment or coordination.    MILD FLU-LIKE SYMPTOMS ARE NORMAL. YOU MAY EXPERIENCE GENERALIZED MUSCLE ACHES, THROAT IRRITATION, HEADACHE AND/OR SOME NAUSEA.    FOR 24 HOURS DO NOT:  Drive, operate machinery or run household appliances.  Drink beer or alcoholic beverages.   Make important decisions or sign legal documents.    DIET: To avoid nausea, slowly advance diet as tolerated, avoiding spicy or greasy foods for the first day.  Add more substantial food to your diet according to your physician's instructions.  INCREASE FLUIDS AND FIBER TO AVOID CONSTIPATION.    FOLLOW-UP APPOINTMENT:  A follow-up appointment should be arranged with your doctor in 1-2 weeks; call to schedule.    You should CALL YOUR PHYSICIAN if you develop:  Fever greater than 101 degrees F.  Pain not relieved by medication, or persistent nausea or vomiting.  Excessive bleeding (blood soaking through dressing) or unexpected drainage from the wound.  Extreme redness or swelling around the incision site, drainage of pus or foul smelling drainage.  Inability to urinate or empty your bladder within 8 hours.  Problems with breathing or chest pain.    You should call 911 if you develop problems  with breathing or chest pain.  If you are unable to contact your doctor or surgical center, you should go to the nearest emergency room or urgent care center.    Physician's telephone #: [phone] Dr Belcher    If any questions arise, call your doctor.  If your doctor is not available, please feel free to call the Surgical Center at [phone]. The Contact Center is open Monday through Friday 7AM to 5PM and may speak to a nurse at [phone], or toll free at [phone].     A registered nurse may call you a few days after your surgery to see how you are doing after your procedure.    MEDICATIONS: Resume taking daily medication.  Take prescribed pain medication with food.  If no medication is prescribed, you may take non-aspirin pain medication if needed.  PAIN MEDICATION CAN BE VERY CONSTIPATING.  Take a stool softener or laxative such as senokot, pericolace, or milk of magnesia if needed.    Depression / Suicide Risk    As you are discharged from this [facility], it is important to learn how to keep safe from harming yourself.    Recognize the warning signs:  · Abrupt changes in personality, positive or negative- including increase in energy   · Giving away possessions  · Change in eating patterns- significant weight changes-  positive or negative  · Change in sleeping patterns- unable to sleep or sleeping all the time   · Unwillingness or inability to communicate  · Depression  · Unusual sadness, discouragement and loneliness  · Talk of wanting to die  · Neglect of personal appearance   · Rebelliousness- reckless behavior  · Withdrawal from people/activities they love  · Confusion- inability to concentrate     If you or a loved one observes any of these behaviors or has concerns about self-harm, here's what you can do:  · Talk about it- your feelings and reasons for harming yourself  · Remove any means that you might use to hurt yourself (examples: pills, rope, extension cords,  firearm)  · Get professional help from the community (Mental Health, Substance Abuse, psychological counseling)  · Do not be alone:Call your Safe Contact- someone whom you trust who will be there for you.  · Call your local CRISIS HOTLINE 170-8792 or 583-992-4528  · Call your local Children's Mobile Crisis Response Team [place] (628) 274-4601 or www.[employer]  · Call the toll free National Suicide Prevention Hotlines   · National Suicide Prevention Lifeline 217-799-PXWU (7518)  · National Hope Line Network 800-SUICIDE (174-1402)

## 2021-11-01 NOTE — OP REPORT
Electrophysiology Procedure Note  [facility]    Procedure(s) Performed:   Supraventricular tachycardia ablation (atrial flutter ablation)   Electroanatomical 3D mapping with CARTO  CS/LA pacing and recording  IV drug infusion for arrhythmia induction  Intra-cardiac echocardiography    Indication: Typical atrial flutter    : Junior Belcher M.D.    Anesthesia: General anesthesia    Anesthesiologist: Dr Cisse    Specimen(s) Removed: None    Estimated Blood Loss:  20cc    Complications: None    Pre-procedure ECG: SR    Post Procedure ECG: SR    Description of Procedure:    After informed written consent, the patient was brought to the EP lab in the fasting, non-sedated state. General anaesthesia given to the patient. The patient was prepped and draped in the usual sterile fashion. Femoral venous access was obtained using the modified Seldinger technique. In the right femoral vein, 3 sheaths (8,8,7 Fr) were inserted over 0.35” guidewires. A deflectable decapolar catheter was advanced to the CS position. One 8Fr saline irrigated ablation catheter with 3.5mm distal electrode and location sensor for the CARTO system ([employer] ST SF FJ) was inserted into an 8Fr sheath for electroanatomical 3D mapping and radiofrequency ablation. An 8F ICE catheter was advanced to the right atrium for catheter contact visualization and effusion monitoring during the case. The 8Fr sheath was exchanged for a long RAMP sheath ([employer]) for ablation.     At the end of the procedure, the catheter and sheaths were removed, and hemostasis was achieved by manual compression. Following recovery from anesthesia, the patient was transferred to the PACU in good condition.     Baseline Rhythm: NSR     Intervals:    HV  51  AVNBCL  300ms  AVNERP 600/250ms      Arrhythmias:  1. Sustained typical CCW atrial flutter. SVT was induced with LA burst pacing from the CS catheter. SVT CL 250ms.      Mapping:  Electroanatomical 3D (CARTO FAM) map of CS and right atrium around the cavotricuspid isthmus was created during flutter. A his cloud was created. An LAT map was made demonstrating CCW typical atrial flutter.     On ICE imaging, an extremely large, pouchy CTI was identified. There was no easy line of block to create unfortunately. Ablation required an extensive lesion set to complete block.     Ablation:  Radiofrequency energy was applied using 3.5 mm saline irrigated catheter, power 35-40W, total 37RF applications, RF time 994 seconds to create a cavotricuspid isthmus line between the tricuspid annulus and Eustachian ridge/inferior vena cava to produce bidirectional isthmus conduction block. Flutter terminated during RF application.     Post Ablation Testin. Bidirectional isthmus block >190ms  2. No inducible flutter or SVT despite Isuprel infusion and burst pacing     Fluoroscopy Time: 0 min     Impressions/Plan:   1. Typical CCW  right atrial flutter.  2. Successful catheter mediated ablation of right atrial flutter with bidirectional isthmus block. Technically challenging due to large CTI pouch.  3. Non inducible post ablation.  4. Bed rest for 2 hours in monitored recovery.  5. Stop flecainide and Xarleto

## 2021-11-01 NOTE — ANESTHESIA PROCEDURE NOTES
Airway    Date/Time: 11/1/2021 12:21 PM  Performed by: Chang Cisse M.D.  Authorized by: Chang Cisse M.D.     Location:  OR  Urgency:  Elective  Difficult Airway: No    Indications for Airway Management:  Anesthesia      Spontaneous Ventilation: absent    Sedation Level:  Deep  Preoxygenated: Yes    Patient Position:  Sniffing  Mask Difficulty Assessment:  2 - vent by mask + OA or adjuvant +/- NMBA  Final Airway Type:  Endotracheal airway  Final Endotracheal Airway:  ETT  Cuffed: Yes    Technique Used for Successful ETT Placement:  Direct laryngoscopy    Insertion Site:  Oral  Blade Type:  Rae  Laryngoscope Blade/Videolaryngoscope Blade Size:  2  ETT Size (mm):  7.5  Measured from:  Teeth  ETT to Teeth (cm):  21  Placement Verified by: auscultation and capnometry    Cormack-Lehane Classification:  Grade I - full view of glottis  Number of Attempts at Approach:  1

## 2021-11-01 NOTE — ANESTHESIA PREPROCEDURE EVALUATION
Relevant Problems   PULMONARY   (positive) Asthma-COPD overlap syndrome (HCC)      CARDIAC   (positive) Atrial flutter (HCC)      GI   (positive) Gastroesophageal reflux disease       Physical Exam    Airway   Mallampati: II  TM distance: >3 FB  Neck ROM: full       Cardiovascular - normal exam  Rhythm: regular  Rate: normal  (-) murmur     Dental - normal exam           Pulmonary - normal exam  Breath sounds clear to auscultation     Abdominal    Neurological - normal exam                 Anesthesia Plan    ASA 2       Plan - general       Airway plan will be ETT          Induction: intravenous      Pertinent diagnostic labs and testing reviewed    Informed Consent:    Anesthetic plan and risks discussed with patient.

## 2021-11-01 NOTE — OR NURSING
Pt arrived to PACU, monitors applied and report received from MD and RN. Pt denies pain and nausea.   Right groin soft and drsg CDI.     Pt's  called and message left on answering machine.

## 2021-12-03 ENCOUNTER — OFFICE VISIT (OUTPATIENT)
Dept: CARDIOLOGY | Facility: MEDICAL CENTER | Age: 66
End: 2021-12-03
Payer: MEDICARE

## 2021-12-03 VITALS
OXYGEN SATURATION: 97 % | BODY MASS INDEX: 21.07 KG/M2 | HEIGHT: 68 IN | HEART RATE: 79 BPM | SYSTOLIC BLOOD PRESSURE: 110 MMHG | DIASTOLIC BLOOD PRESSURE: 72 MMHG | WEIGHT: 139 LBS

## 2021-12-03 DIAGNOSIS — I48.92 ATRIAL FLUTTER, UNSPECIFIED TYPE (HCC): ICD-10-CM

## 2021-12-03 DIAGNOSIS — E11.65 TYPE 2 DIABETES MELLITUS WITH HYPERGLYCEMIA, WITHOUT LONG-TERM CURRENT USE OF INSULIN (HCC): ICD-10-CM

## 2021-12-03 DIAGNOSIS — E78.2 MIXED HYPERLIPIDEMIA: ICD-10-CM

## 2021-12-03 PROCEDURE — 99214 OFFICE O/P EST MOD 30 MIN: CPT | Performed by: NURSE PRACTITIONER

## 2021-12-03 PROCEDURE — 93000 ELECTROCARDIOGRAM COMPLETE: CPT | Performed by: INTERNAL MEDICINE

## 2021-12-03 ASSESSMENT — ENCOUNTER SYMPTOMS
CLAUDICATION: 0
WEIGHT LOSS: 0
PALPITATIONS: 0
PND: 0
ORTHOPNEA: 0
DIZZINESS: 0
WEAKNESS: 0
SHORTNESS OF BREATH: 0

## 2021-12-03 ASSESSMENT — FIBROSIS 4 INDEX: FIB4 SCORE: 1.32

## 2021-12-03 NOTE — PROGRESS NOTES
Chief Complaint   Patient presents with   • Atrial Flutter       Subjective     Anayadarshan Browne is a very pleasant  66 y.o. female who presents today for follow up after undergoing successful ablation of right atrial flutter with bidirectional isthmus bloc with Dr. Belcher on 11/1/21.     Other past medical history significant for borderline DM, mild HLD, persistent cough and GERD.     Today patient states that she is feeling significantly better since her ablation. Patient unfortunately lost her brother this past weekend, he passed away from vascular dementia after suffering numerous strokes.  Denies having any arrhyhtmia recurrence. Reports that her right femoral access site is well healed. Denies having any significant concerns or complaints to discuss today.     Past Medical History:   Diagnosis Date   • Atrial fibrillation (HCC)    • Atrial flutter (HCC)    • Back pain    • Chickenpox    • Cough    • Daytime sleepiness    • Depression    • Diabetes (HCC) 09/17/2021    Takes Metformin   • GERD (gastroesophageal reflux disease)    • Hoarseness, persistent    • Influenza    • Mumps    • Shortness of breath    • Skin change    • Sputum production    • Sweat, sweating, excessive    • Wears glasses      Past Surgical History:   Procedure Laterality Date   • HYSTERECTOMY LAPAROSCOPY     • HYSTERECTOMY LAPAROSCOPY     • MAMMOPLASTY AUGMENTATION Bilateral    • OTHER      EGD with Bravo Capsule Placed 8-.     Family History   Problem Relation Age of Onset   • Breast Cancer Mother         breast cancer 68   • Cancer Mother    • Lung Cancer Father    • Dementia Brother         vascular   • Stroke Brother    • Cancer Maternal Aunt         breast cancer     Social History     Socioeconomic History   • Marital status:      Spouse name: Not on file   • Number of children: Not on file   • Years of education: Not on file   • Highest education level: Not on file   Occupational History   • Not on file   Tobacco  Use   • Smoking status: Former Smoker     Packs/day: 0.50     Years: 30.00     Pack years: 15.00     Types: Cigarettes     Quit date: 2012     Years since quittin.8   • Smokeless tobacco: Never Used   Vaping Use   • Vaping Use: Never used   Substance and Sexual Activity   • Alcohol use: Yes     Alcohol/week: 1.5 oz     Types: 3 Glasses of wine per week   • Drug use: Not Currently     Frequency: 2.0 times per week     Types: Marijuana, Oral     Comment: twice a week   • Sexual activity: Not on file   Other Topics Concern   • Not on file   Social History Narrative   • Not on file     Social Determinants of Health     Financial Resource Strain:    • Difficulty of Paying Living Expenses: Not on file   Food Insecurity:    • Worried About Running Out of Food in the Last Year: Not on file   • Ran Out of Food in the Last Year: Not on file   Transportation Needs:    • Lack of Transportation (Medical): Not on file   • Lack of Transportation (Non-Medical): Not on file   Physical Activity:    • Days of Exercise per Week: Not on file   • Minutes of Exercise per Session: Not on file   Stress:    • Feeling of Stress : Not on file   Social Connections:    • Frequency of Communication with Friends and Family: Not on file   • Frequency of Social Gatherings with Friends and Family: Not on file   • Attends Hinduism Services: Not on file   • Active Member of Clubs or Organizations: Not on file   • Attends Club or Organization Meetings: Not on file   • Marital Status: Not on file   Intimate Partner Violence:    • Fear of Current or Ex-Partner: Not on file   • Emotionally Abused: Not on file   • Physically Abused: Not on file   • Sexually Abused: Not on file   Housing Stability:    • Unable to Pay for Housing in the Last Year: Not on file   • Number of Places Lived in the Last Year: Not on file   • Unstable Housing in the Last Year: Not on file     No Known Allergies  Outpatient Encounter Medications as of 12/3/2021   Medication  "Sig Dispense Refill   • estradiol (ESTRACE) 0.1 MG/GM vaginal cream Insert 1 g into the vagina see administration instructions. Pt takes twice a week, no set days     • montelukast (SINGULAIR) 10 MG Tab Take 1 Tablet by mouth every evening.     • cetirizine (KLS ALLER-DANYELLE) 10 MG Tab Take 10 mg by mouth every day.     • L-THEANINE PO Take 1 Capsule by mouth every day.     • magnesium oxide (MAG-OX) 400 MG Tab Take 400 mg by mouth every day.     • ALPRAZolam (XANAX) 0.5 MG Tab Take 0.25 mg by mouth 3 times a day as needed for Sleep or Anxiety.     • metformin (GLUCOPHAGE) 500 MG Tab Take 500 mg by mouth 2 times a day, with meals.       No facility-administered encounter medications on file as of 12/3/2021.     Review of Systems   Constitutional: Negative for malaise/fatigue and weight loss.   Respiratory: Negative for shortness of breath.    Cardiovascular: Negative for chest pain, palpitations, orthopnea, claudication, leg swelling and PND.   Neurological: Negative for dizziness and weakness.   All other systems reviewed and are negative.             Objective     /72 (BP Location: Left arm, Patient Position: Sitting, BP Cuff Size: Adult)   Pulse 79   Ht 1.727 m (5' 8\")   Wt 63 kg (139 lb)   SpO2 97%   BMI 21.13 kg/m²     Physical Exam  Constitutional:       General: She is not in acute distress.     Appearance: She is well-developed.   HENT:      Head: Normocephalic.   Eyes:      Extraocular Movements: Extraocular movements intact.   Neck:      Vascular: No carotid bruit or JVD.   Cardiovascular:      Rate and Rhythm: Normal rate and regular rhythm.      Heart sounds: Normal heart sounds. No murmur heard.      Pulmonary:      Effort: No respiratory distress.      Breath sounds: Normal breath sounds.   Abdominal:      Palpations: Abdomen is soft.      Tenderness: There is no abdominal tenderness.   Musculoskeletal:      Cervical back: Normal range of motion.      Right lower leg: No edema.      Left lower " leg: No edema.   Skin:     General: Skin is warm and dry.   Neurological:      Mental Status: She is alert and oriented to person, place, and time.   Psychiatric:         Mood and Affect: Mood normal.         Behavior: Behavior normal.         Thought Content: Thought content normal.                Assessment & Plan     1. Atrial flutter, unspecified type (HCC)  EKG    EKG   2. Mixed hyperlipidemia     3. Type 2 diabetes mellitus with hyperglycemia, without long-term current use of insulin (HCC)         Medical Decision Making: Today's Assessment/Status/Plan:     AFL:  - S/P successful ablation of right atrial flutter with bidirectional isthmus bloc with Dr. Belcher on 11/1/21.   - Maintaining NSR.  - Expected healing duration discussed with patient. She will call if she has any arrhyhtmia recurrence.     Mild HLD:  - Patient has not been able to engage in regular aerobic exercise due to her AFL and now that she is feeling better hopes to get back in to a regular exercise regimen. She is also engaging in a heart healthy appetite.     DM:  - A1C well controlled on Metformin only.     Patient will follow up with Dr. Nichols in 6 months or earlier if needed. Encouraged patient to contact our office should any questions or concerns arise in the meant time.       Future Appointments   Date Time Provider Department Center   12/17/2021  8:00 AM Carla Pacheco M.D. [facility]   6/3/2022  7:40 AM Junior Belcher M.D. [facility] None

## 2021-12-06 LAB — EKG IMPRESSION: NORMAL

## 2021-12-17 ENCOUNTER — OFFICE VISIT (OUTPATIENT)
Dept: MEDICAL GROUP | Facility: MEDICAL CENTER | Age: 66
End: 2021-12-17
Payer: MEDICARE

## 2021-12-17 VITALS
HEIGHT: 68 IN | SYSTOLIC BLOOD PRESSURE: 114 MMHG | RESPIRATION RATE: 16 BRPM | OXYGEN SATURATION: 98 % | BODY MASS INDEX: 21.05 KG/M2 | TEMPERATURE: 98.3 F | HEART RATE: 72 BPM | WEIGHT: 138.89 LBS | DIASTOLIC BLOOD PRESSURE: 72 MMHG

## 2021-12-17 DIAGNOSIS — I48.92 ATRIAL FLUTTER, UNSPECIFIED TYPE (HCC): ICD-10-CM

## 2021-12-17 DIAGNOSIS — J44.89 ASTHMA-COPD OVERLAP SYNDROME (HCC): ICD-10-CM

## 2021-12-17 DIAGNOSIS — E78.2 MIXED HYPERLIPIDEMIA: ICD-10-CM

## 2021-12-17 DIAGNOSIS — R73.9 HYPERGLYCEMIA: ICD-10-CM

## 2021-12-17 LAB
HBA1C MFR BLD: 6 % (ref 0–5.6)
INT CON NEG: NEGATIVE
INT CON POS: POSITIVE

## 2021-12-17 PROCEDURE — 83036 HEMOGLOBIN GLYCOSYLATED A1C: CPT | Performed by: FAMILY MEDICINE

## 2021-12-17 PROCEDURE — 99214 OFFICE O/P EST MOD 30 MIN: CPT | Performed by: FAMILY MEDICINE

## 2021-12-17 ASSESSMENT — FIBROSIS 4 INDEX: FIB4 SCORE: 1.32

## 2021-12-17 NOTE — ASSESSMENT & PLAN NOTE
Anaya tries to maintain a healthy, whole foods diet although admits it has been more difficult with the holidays as well as the recent death of her brother. HgbA1C has remained stable at 6.0. She reports compliance with metformin 500mg BID.    Monofilament testing with a 10 gram force: sensation intact: intact bilaterally  Visual Inspection: Feet without maceration, ulcers, fissures.  Pedal pulses: intact bilaterally

## 2021-12-17 NOTE — PROGRESS NOTES
Subjective:     CC: follow up chronic conditions    HPI:   Anaya Wills presents today with:    Hyperglycemia  Anaya tries to maintain a healthy, whole foods diet although admits it has been more difficult with the holidays as well as the recent death of her brother. HgbA1C has remained stable at 6.0. She reports compliance with metformin 500mg BID.    Monofilament testing with a 10 gram force: sensation intact: intact bilaterally  Visual Inspection: Feet without maceration, ulcers, fissures.  Pedal pulses: intact bilaterally      Atrial flutter  Anaya is s/p ablation 2021 by Dr. Belcher. She reports she feels significantly better s/p ablation. She had a visit with cardiology about two weeks ago and will follow up in 6 months.    Asthma-COPD overlap syndrome (HCC)  Anaya reports her breathing has significantly improved s/p ablation for a-flutter.       Past Medical History:   Diagnosis Date   • Atrial fibrillation (HCC)    • Atrial flutter (HCC)    • Back pain    • Chickenpox    • Cough    • Daytime sleepiness    • Depression    • Diabetes (HCC) 2021    Takes Metformin   • GERD (gastroesophageal reflux disease)    • Hoarseness, persistent    • Influenza    • Mumps    • Shortness of breath    • Skin change    • Sputum production    • Sweat, sweating, excessive    • Wears glasses        Social History     Tobacco Use   • Smoking status: Former Smoker     Packs/day: 0.50     Years: 30.00     Pack years: 15.00     Types: Cigarettes     Quit date: 2012     Years since quittin.8   • Smokeless tobacco: Never Used   Vaping Use   • Vaping Use: Never used   Substance Use Topics   • Alcohol use: Yes     Alcohol/week: 1.5 oz     Types: 3 Glasses of wine per week   • Drug use: Not Currently     Frequency: 2.0 times per week     Types: Marijuana, Oral     Comment: twice a week       Current Outpatient Medications Ordered in Epic   Medication Sig Dispense Refill   • estradiol (ESTRACE) 0.1 MG/GM vaginal cream  "Insert 1 g into the vagina see administration instructions. Pt takes twice a week, no set days     • montelukast (SINGULAIR) 10 MG Tab Take 1 Tablet by mouth every evening.     • cetirizine (KLS ALLER-DANYELLE) 10 MG Tab Take 10 mg by mouth every day.     • L-THEANINE PO Take 1 Capsule by mouth every day.     • magnesium oxide (MAG-OX) 400 MG Tab Take 400 mg by mouth every day.     • ALPRAZolam (XANAX) 0.5 MG Tab Take 0.25 mg by mouth 3 times a day as needed for Sleep or Anxiety.     • metformin (GLUCOPHAGE) 500 MG Tab Take 500 mg by mouth 2 times a day, with meals.       No current Epic-ordered facility-administered medications on file.       Allergies:  Patient has no known allergies.    Health Maintenance: Patient will get vaccinations at pharmacy at later date    ROS:  Gen: no fevers/chills, no changes in weight  Eyes: no changes in vision  ENT: no sore throat, no hearing loss, no bloody nose  Pulm: no SOB  CV: no chest pain    Objective:       Exam:  /72 (BP Location: Left arm, Patient Position: Sitting, BP Cuff Size: Adult)   Pulse 72   Temp 36.8 °C (98.3 °F) (Temporal)   Resp 16   Ht 1.727 m (5' 8\")   Wt 63 kg (138 lb 14.2 oz)   SpO2 98%   BMI 21.12 kg/m²  Body mass index is 21.12 kg/m².    Gen: Alert and oriented, No apparent distress  Lungs: Normal effort, CTA bilaterally, no wheezes, rhonchi, or rales  CV: Regular rate and rhythm, no murmurs, rubs, or gallops      Assessment & Plan:     66 y.o. female with the following -     1. Hyperglycemia  - Continue metformin 500mg BID, healthy diet, exercise  - POCT Hemoglobin A1C  - Comp Metabolic Panel; Future  - HEMOGLOBIN A1C; Future  - Lipid Profile; Future  - MICROALBUMIN CREAT RATIO URINE; Future  - CBC WITH DIFFERENTIAL; Future  - Diabetic Monofilament LE Exam    2. Mixed hyperlipidemia  - Continue whole foods, Mediterranean type diet  - Comp Metabolic Panel; Future  - HEMOGLOBIN A1C; Future  - Lipid Profile; Future    3. Atrial flutter, unspecified " type (HCC)  - s/p ablation 11/21, patient reports feeling much better    4. Asthma-COPD overlap syndrome (HCC)  - breathing improved s/p ablation for a-flutter, CTM    Return in about 6 months (around 6/17/2022).    Please note this dictation was created using voice recognition software. I have made every reasonable attempt to correct obvious errors, but I expect there may be errors of grammar, and possibly content, that I did not discover before finalizing the note.

## 2021-12-17 NOTE — ASSESSMENT & PLAN NOTE
Anaya is s/p ablation 11/1/2021 by Dr. Belcher. She reports she feels significantly better s/p ablation. She had a visit with cardiology about two weeks ago and will follow up in 6 months.

## 2022-02-02 ENCOUNTER — NON-PROVIDER VISIT (OUTPATIENT)
Dept: SLEEP MEDICINE | Facility: MEDICAL CENTER | Age: 67
End: 2022-02-02
Attending: INTERNAL MEDICINE
Payer: MEDICARE

## 2022-02-02 VITALS — WEIGHT: 142 LBS | HEIGHT: 68 IN | BODY MASS INDEX: 21.52 KG/M2

## 2022-02-02 DIAGNOSIS — R05.3 CHRONIC COUGH: ICD-10-CM

## 2022-02-02 PROCEDURE — 94726 PLETHYSMOGRAPHY LUNG VOLUMES: CPT | Performed by: INTERNAL MEDICINE

## 2022-02-02 PROCEDURE — 94729 DIFFUSING CAPACITY: CPT | Performed by: INTERNAL MEDICINE

## 2022-02-02 PROCEDURE — 94060 EVALUATION OF WHEEZING: CPT | Performed by: INTERNAL MEDICINE

## 2022-02-02 ASSESSMENT — PULMONARY FUNCTION TESTS
FEV1_PERCENT_CHANGE: 10
FEV1: 2.07
FEV1_PERCENT_PREDICTED: 70
FVC: 3.15
FEV1: 1.87
FEV1/FVC_PERCENT_PREDICTED: 85
FEV1_PERCENT_CHANGE: 3
FEV1/FVC_PERCENT_PREDICTED: 79
FEV1_LLN: 2.22
FVC_PREDICTED: 3.42
FEV1/FVC: 65.71
FEV1/FVC: 66
FVC_LLN: 2.86
FEV1/FVC_PERCENT_PREDICTED: 78
FEV1/FVC_PERCENT_CHANGE: 7
FEV1/FVC_PERCENT_PREDICTED: 78
FVC_PERCENT_PREDICTED: 89
FEV1/FVC_PERCENT_LLN: 65
FEV1_PREDICTED: 2.66
FEV1/FVC: 61
FEV1/FVC_PREDICTED: 78
FEV1/FVC_PERCENT_PREDICTED: 83
FEV1/FVC: 61
FEV1/FVC_PERCENT_CHANGE: 333
FVC: 3.05
FVC_PERCENT_PREDICTED: 91
FEV1_PERCENT_PREDICTED: 77

## 2022-02-02 ASSESSMENT — FIBROSIS 4 INDEX: FIB4 SCORE: 1.32

## 2022-02-02 NOTE — PROCEDURES
Technician: Sandra Billingsley RRT, CPFT  Good patient effort & cooperation.  The results of this test meet the ATS/ERS standards for acceptability & reproducibility.  Test was performed on the [employer] Body Plethysmograph-Elite DX system.  Predicted equations for Spirometry are GLI-2012, ITS for lung volumes, and GLI-2017 for DLCO.  The DLCO was uncorrected for Hgb.  A bronchodilator of Xopenex HFA -2puffs via spacer administered.  DLCO performed during dilation period.    1. Baseline spirometry demonstrates a mild reduction in FEV1 at 1.87 L (70% predicted). FEV1/FVC ratio is reduced at 61%.    2. After administration of an inhaled bronchodilator there is 10% improvement in FEV1.    3. Lung volumes demonstrate mild hyperinflation.    4. Gas exchange as estimated by DLCO is normal at 102% of predicted.    5. Airway resistance is mildly increased.      Impression:    This study demonstrates the presence of mild obstructive lung disease.  10% reversibility is noted on the study.

## 2022-02-08 ENCOUNTER — OFFICE VISIT (OUTPATIENT)
Dept: SLEEP MEDICINE | Facility: MEDICAL CENTER | Age: 67
End: 2022-02-08
Payer: MEDICARE

## 2022-02-08 VITALS
BODY MASS INDEX: 21.97 KG/M2 | OXYGEN SATURATION: 98 % | WEIGHT: 140 LBS | HEIGHT: 67 IN | DIASTOLIC BLOOD PRESSURE: 78 MMHG | RESPIRATION RATE: 16 BRPM | SYSTOLIC BLOOD PRESSURE: 116 MMHG

## 2022-02-08 DIAGNOSIS — R05.3 CHRONIC COUGH: ICD-10-CM

## 2022-02-08 DIAGNOSIS — J44.89 ASTHMA-COPD OVERLAP SYNDROME (HCC): ICD-10-CM

## 2022-02-08 PROCEDURE — 99213 OFFICE O/P EST LOW 20 MIN: CPT | Performed by: PHYSICIAN ASSISTANT

## 2022-02-08 ASSESSMENT — ENCOUNTER SYMPTOMS
SINUS PAIN: 0
HEARTBURN: 0
SHORTNESS OF BREATH: 0
ROS GI COMMENTS: NO DENTURES, NO DIFFICULTY SWALLOWING
DIZZINESS: 0
COUGH: 1
SPUTUM PRODUCTION: 1
WHEEZING: 0
INSOMNIA: 0
SORE THROAT: 0
PALPITATIONS: 1
CHILLS: 0
FEVER: 0
WEIGHT LOSS: 0
ORTHOPNEA: 0
HEADACHES: 0
TREMORS: 0

## 2022-02-08 ASSESSMENT — FIBROSIS 4 INDEX: FIB4 SCORE: 1.32

## 2022-02-08 NOTE — PROGRESS NOTES
CC: Follow-up cough and PFT results    HPI:  Anaya Browne is a 66 y.o. year old female here today for follow-up on cough attributed COPD overlap syndrome. Last seen in clinic 2/24/2021 by Dr. Tanner Cruz.    Pertinent past medical history includes primarily nonproductive and present after meals.  She does have a cat.  Reports diligent friends.  Pertinent past medical history includes benign essential tremor GERD, chronic sinusitis, paroxysmal atrial fibrillation followed by cardiology.    Reviewed in clinic vitals including /78, HR 70, O2 sat 98% on room air and BMI of 21.93 kg/m².    Reviewed home medication regimen including montelukast and cetirizine.  Patient reports that Symbicort and albuterol worsened her cough and she is not taking.    No recent chest imaging.  Patient did have a barium swallow 5/13/2021 demonstrating small hiatal hernia, no appreciable reflux, normal esophageal motility.    Pulmonary function testing obtained 2/2/2022 demonstrated an FEV1 of 1.87 or 70% predicted, FVC of 3.05 L or 89% predicted, FEV1/FVC ratio of 61, residual volume of 130% predicted, % predicted and DLCO 102% predicted.  Per pulmonologist interpretation study demonstrates mild reduction in FEV1 mild hyperinflation, 6 change and mildly increased airway resistance.  Study demonstrates the presence of mild obstructive lung disease with 10% reversibility noted on study.    Review of Systems   Constitutional: Positive for malaise/fatigue (mild). Negative for chills, fever and weight loss.   HENT: Negative for congestion, hearing loss, nosebleeds, sinus pain, sore throat and tinnitus.    Eyes:        Presc glasses   Respiratory: Positive for cough (after eating, first thing ) and sputum production. Negative for shortness of breath and wheezing.    Cardiovascular: Positive for palpitations (ablation in November). Negative for chest pain, orthopnea and leg swelling (resolved ).   Gastrointestinal: Negative  for heartburn.        No dentures, no difficulty swallowing    Neurological: Negative for dizziness, tremors and headaches.   Psychiatric/Behavioral: The patient does not have insomnia.        Past Medical History:   Diagnosis Date   • Atrial fibrillation (HCC)    • Atrial flutter (HCC)    • Back pain    • Chickenpox    • Cough    • Daytime sleepiness    • Depression    • Diabetes (HCC) 09/17/2021    Takes Metformin   • GERD (gastroesophageal reflux disease)    • Hoarseness, persistent    • Influenza    • Mumps    • Shortness of breath    • Skin change    • Sputum production    • Sweat, sweating, excessive    • Wears glasses        Past Surgical History:   Procedure Laterality Date   • HYSTERECTOMY LAPAROSCOPY     • HYSTERECTOMY LAPAROSCOPY     • MAMMOPLASTY AUGMENTATION Bilateral    • OTHER      EGD with Bravo Capsule Placed 8-.       Family History   Problem Relation Age of Onset   • Breast Cancer Mother         breast cancer 68   • Cancer Mother    • Lung Cancer Father    • Dementia Brother         vascular   • Stroke Brother    • Cancer Maternal Aunt         breast cancer       Social History     Socioeconomic History   • Marital status:      Spouse name: Not on file   • Number of children: Not on file   • Years of education: Not on file   • Highest education level: Not on file   Occupational History   • Not on file   Tobacco Use   • Smoking status: Former Smoker     Packs/day: 0.50     Years: 30.00     Pack years: 15.00     Types: Cigarettes     Quit date: 2/1/2012     Years since quitting: 10.0   • Smokeless tobacco: Never Used   Vaping Use   • Vaping Use: Never used   Substance and Sexual Activity   • Alcohol use: Yes     Alcohol/week: 1.5 oz     Types: 3 Glasses of wine per week   • Drug use: Not Currently     Frequency: 2.0 times per week     Types: Marijuana, Oral     Comment: twice a week   • Sexual activity: Not on file   Other Topics Concern   • Not on file   Social History Narrative   •  "Not on file     Social Determinants of Health     Financial Resource Strain:    • Difficulty of Paying Living Expenses: Not on file   Food Insecurity:    • Worried About Running Out of Food in the Last Year: Not on file   • Ran Out of Food in the Last Year: Not on file   Transportation Needs:    • Lack of Transportation (Medical): Not on file   • Lack of Transportation (Non-Medical): Not on file   Physical Activity:    • Days of Exercise per Week: Not on file   • Minutes of Exercise per Session: Not on file   Stress:    • Feeling of Stress : Not on file   Social Connections:    • Frequency of Communication with Friends and Family: Not on file   • Frequency of Social Gatherings with Friends and Family: Not on file   • Attends Holiness Services: Not on file   • Active Member of Clubs or Organizations: Not on file   • Attends Club or Organization Meetings: Not on file   • Marital Status: Not on file   Intimate Partner Violence:    • Fear of Current or Ex-Partner: Not on file   • Emotionally Abused: Not on file   • Physically Abused: Not on file   • Sexually Abused: Not on file   Housing Stability:    • Unable to Pay for Housing in the Last Year: Not on file   • Number of Places Lived in the Last Year: Not on file   • Unstable Housing in the Last Year: Not on file       Allergies as of 02/08/2022   • (No Known Allergies)        @Vital signs for this encounter:  /78   Resp 16   Ht 1.702 m (5' 7\")   Wt 63.5 kg (140 lb)   SpO2 98%     Current medications as of today   Current Outpatient Medications   Medication Sig Dispense Refill   • estradiol (ESTRACE) 0.1 MG/GM vaginal cream Insert 1 g into the vagina see administration instructions. Pt takes twice a week, no set days     • montelukast (SINGULAIR) 10 MG Tab Take 1 Tablet by mouth every evening.     • cetirizine (KLS ALLER-DANYELLE) 10 MG Tab Take 10 mg by mouth every day.     • L-THEANINE PO Take 1 Capsule by mouth every day.     • magnesium oxide (MAG-OX) 400 MG " Tab Take 400 mg by mouth every day.     • ALPRAZolam (XANAX) 0.5 MG Tab Take 0.25 mg by mouth 3 times a day as needed for Sleep or Anxiety.     • metformin (GLUCOPHAGE) 500 MG Tab Take 500 mg by mouth 2 times a day, with meals.       No current facility-administered medications for this visit.         Physical Exam:   Gen:           Alert and oriented, No apparent distress. Mood and affect appropriate, normal interaction with provider.  Eyes:          sclere white, conjunctive moist.  Hearing:     Grossly intact.  Dentition:    Good dentition.  Oropharynx:   Tongue normal, posterior pharynx without erythema or exudate.  Neck:        Supple, trachea midline, no masses.  Respiratory Effort: No intercostal retractions or use of accessory muscles.   Lung Auscultation:      Clear to auscultation bilaterally; no rales, rhonchi or wheezing.  CV:            Regular rate and rhythm. No edema. No murmurs, rubs or gallops.  Digits, Nails, Ext: No clubbing, cyanosis, petechiae, or nodes.   Skin:        No rashes, lesions or ulcers noted on exposed skin surfaces.                     Assessment:  1. Chronic cough     2. Asthma-COPD overlap syndrome (HCC)         Immunizations:    Flu: 9/15/2020  Pneumovax 23: 1/11/2022   Prevnar 13:Deferred  Pfizer SARS-CoV-2 vaccine: 10/4/2021, 1/14/2021, 3/11/2021    Plan:    66 y.o. year old female here today for follow-up on cough attributed COPD overlap syndrome. Last seen in clinic 2/24/2021 by Dr. Tanner Cruz.    Pertinent past medical history includes primarily nonproductive and present after meals.  She does have a cat.  Reports diligent friends.  Pertinent past medical history includes benign essential tremor GERD, chronic sinusitis, paroxysmal atrial fibrillation followed by cardiology.    Reviewed in clinic vitals including /78, HR 70, O2 sat 98% on room air and BMI of 21.93 kg/m².  She is able to exercise regularly.    Reviewed home medication regimen including montelukast  and cetirizine.  Patient reports that Symbicort and albuterol worsened her cough and she is not taking.    Cough/asthma COPD overlap: Continued use of montelukast and OTC antihistamines as well as daily sinus rinse.  Reviewed pulmonary function testing, did show trend towards benefit with bronchodilator use but did not meet ATS criteria.    Stable chronic cough.  Denies any increase in symptoms with poor air conditions last summer.  Current on Covid vaccines.  Okay to follow-up annually biannually, sooner if needed.    This dictation was created using voice recognition software. The accuracy of the dictation is limited to the abilities of the software. I expect there may be some errors of grammar and possibly content.

## 2022-02-08 NOTE — PATIENT INSTRUCTIONS
1-did not tolerate previous trial of maintenance or rescue inhaler  2-exercising regularly  3-current on covid vaccines  4-reviewed PFT results  5-okay to follow up annually to bi-annually, sooner if needed

## 2022-05-26 ENCOUNTER — HOSPITAL ENCOUNTER (OUTPATIENT)
Dept: RADIOLOGY | Facility: MEDICAL CENTER | Age: 67
End: 2022-05-26
Attending: FAMILY MEDICINE
Payer: MEDICARE

## 2022-05-26 DIAGNOSIS — Z12.31 VISIT FOR SCREENING MAMMOGRAM: ICD-10-CM

## 2022-05-26 PROCEDURE — 77063 BREAST TOMOSYNTHESIS BI: CPT

## 2022-06-06 ENCOUNTER — HOSPITAL ENCOUNTER (OUTPATIENT)
Dept: LAB | Facility: MEDICAL CENTER | Age: 67
End: 2022-06-06
Attending: FAMILY MEDICINE
Payer: MEDICARE

## 2022-06-06 ENCOUNTER — HOSPITAL ENCOUNTER (OUTPATIENT)
Dept: LAB | Facility: MEDICAL CENTER | Age: 67
End: 2022-06-06
Attending: INTERNAL MEDICINE
Payer: MEDICARE

## 2022-06-06 DIAGNOSIS — E78.2 MIXED HYPERLIPIDEMIA: ICD-10-CM

## 2022-06-06 DIAGNOSIS — R73.9 HYPERGLYCEMIA: ICD-10-CM

## 2022-06-06 LAB
25(OH)D3 SERPL-MCNC: 48 NG/ML (ref 30–100)
ALBUMIN SERPL BCP-MCNC: 4.7 G/DL (ref 3.2–4.9)
ALBUMIN/GLOB SERPL: 1.7 G/DL
ALP SERPL-CCNC: 95 U/L (ref 30–99)
ALT SERPL-CCNC: 16 U/L (ref 2–50)
ANION GAP SERPL CALC-SCNC: 10 MMOL/L (ref 7–16)
AST SERPL-CCNC: 23 U/L (ref 12–45)
BASOPHILS # BLD AUTO: 1.1 % (ref 0–1.8)
BASOPHILS # BLD: 0.09 K/UL (ref 0–0.12)
BILIRUB SERPL-MCNC: 0.3 MG/DL (ref 0.1–1.5)
BUN SERPL-MCNC: 17 MG/DL (ref 8–22)
CALCIUM SERPL-MCNC: 9.5 MG/DL (ref 8.5–10.5)
CHLORIDE SERPL-SCNC: 101 MMOL/L (ref 96–112)
CHOLEST SERPL-MCNC: 252 MG/DL (ref 100–199)
CO2 SERPL-SCNC: 26 MMOL/L (ref 20–33)
CREAT SERPL-MCNC: 0.92 MG/DL (ref 0.5–1.4)
CREAT UR-MCNC: 148.56 MG/DL
DHEA-S SERPL-MCNC: 103 UG/DL (ref 9.4–246)
EOSINOPHIL # BLD AUTO: 0.37 K/UL (ref 0–0.51)
EOSINOPHIL NFR BLD: 4.5 % (ref 0–6.9)
ERYTHROCYTE [DISTWIDTH] IN BLOOD BY AUTOMATED COUNT: 43.7 FL (ref 35.9–50)
EST. AVERAGE GLUCOSE BLD GHB EST-MCNC: 134 MG/DL
ESTRADIOL SERPL-MCNC: <5 PG/ML
FASTING STATUS PATIENT QL REPORTED: NORMAL
FERRITIN SERPL-MCNC: 41.7 NG/ML (ref 10–291)
GFR SERPLBLD CREATININE-BSD FMLA CKD-EPI: 69 ML/MIN/1.73 M 2
GLOBULIN SER CALC-MCNC: 2.7 G/DL (ref 1.9–3.5)
GLUCOSE SERPL-MCNC: 115 MG/DL (ref 65–99)
HBA1C MFR BLD: 6.3 % (ref 4–5.6)
HCT VFR BLD AUTO: 46.1 % (ref 37–47)
HCYS SERPL-SCNC: 15.43 UMOL/L
HDLC SERPL-MCNC: 52 MG/DL
HGB BLD-MCNC: 15.3 G/DL (ref 12–16)
IMM GRANULOCYTES # BLD AUTO: 0.03 K/UL (ref 0–0.11)
IMM GRANULOCYTES NFR BLD AUTO: 0.4 % (ref 0–0.9)
IRON SATN MFR SERPL: 33 % (ref 15–55)
IRON SERPL-MCNC: 113 UG/DL (ref 40–170)
LDLC SERPL CALC-MCNC: 161 MG/DL
LYMPHOCYTES # BLD AUTO: 1.75 K/UL (ref 1–4.8)
LYMPHOCYTES NFR BLD: 21.3 % (ref 22–41)
MCH RBC QN AUTO: 31.9 PG (ref 27–33)
MCHC RBC AUTO-ENTMCNC: 33.2 G/DL (ref 33.6–35)
MCV RBC AUTO: 96 FL (ref 81.4–97.8)
MICROALBUMIN UR-MCNC: <1.2 MG/DL
MICROALBUMIN/CREAT UR: NORMAL MG/G (ref 0–30)
MONOCYTES # BLD AUTO: 0.47 K/UL (ref 0–0.85)
MONOCYTES NFR BLD AUTO: 5.7 % (ref 0–13.4)
NEUTROPHILS # BLD AUTO: 5.49 K/UL (ref 2–7.15)
NEUTROPHILS NFR BLD: 67 % (ref 44–72)
NRBC # BLD AUTO: 0 K/UL
NRBC BLD-RTO: 0 /100 WBC
PLATELET # BLD AUTO: 173 K/UL (ref 164–446)
PMV BLD AUTO: 11.8 FL (ref 9–12.9)
POTASSIUM SERPL-SCNC: 4.1 MMOL/L (ref 3.6–5.5)
PROLACTIN SERPL-MCNC: 7.78 NG/ML (ref 2.8–26)
PROT SERPL-MCNC: 7.4 G/DL (ref 6–8.2)
RBC # BLD AUTO: 4.8 M/UL (ref 4.2–5.4)
SODIUM SERPL-SCNC: 137 MMOL/L (ref 135–145)
T3FREE SERPL-MCNC: 3.04 PG/ML (ref 2–4.4)
T4 FREE SERPL-MCNC: 1.09 NG/DL (ref 0.93–1.7)
THYROPEROXIDASE AB SERPL-ACNC: <9 IU/ML (ref 0–9)
TIBC SERPL-MCNC: 342 UG/DL (ref 250–450)
TRIGL SERPL-MCNC: 197 MG/DL (ref 0–149)
TSH SERPL DL<=0.005 MIU/L-ACNC: 2.75 UIU/ML (ref 0.38–5.33)
UIBC SERPL-MCNC: 229 UG/DL (ref 110–370)
VIT B12 SERPL-MCNC: 326 PG/ML (ref 211–911)
WBC # BLD AUTO: 8.2 K/UL (ref 4.8–10.8)

## 2022-06-06 PROCEDURE — 84140 ASSAY OF PREGNENOLONE: CPT

## 2022-06-06 PROCEDURE — 82570 ASSAY OF URINE CREATININE: CPT

## 2022-06-06 PROCEDURE — 82627 DEHYDROEPIANDROSTERONE: CPT

## 2022-06-06 PROCEDURE — 86800 THYROGLOBULIN ANTIBODY: CPT

## 2022-06-06 PROCEDURE — 84439 ASSAY OF FREE THYROXINE: CPT

## 2022-06-06 PROCEDURE — 82670 ASSAY OF TOTAL ESTRADIOL: CPT

## 2022-06-06 PROCEDURE — 83540 ASSAY OF IRON: CPT

## 2022-06-06 PROCEDURE — 84482 T3 REVERSE: CPT

## 2022-06-06 PROCEDURE — 84443 ASSAY THYROID STIM HORMONE: CPT

## 2022-06-06 PROCEDURE — 82306 VITAMIN D 25 HYDROXY: CPT

## 2022-06-06 PROCEDURE — 83036 HEMOGLOBIN GLYCOSYLATED A1C: CPT

## 2022-06-06 PROCEDURE — 85025 COMPLETE CBC W/AUTO DIFF WBC: CPT

## 2022-06-06 PROCEDURE — 36415 COLL VENOUS BLD VENIPUNCTURE: CPT

## 2022-06-06 PROCEDURE — 84305 ASSAY OF SOMATOMEDIN: CPT

## 2022-06-06 PROCEDURE — 80053 COMPREHEN METABOLIC PANEL: CPT

## 2022-06-06 PROCEDURE — 80061 LIPID PANEL: CPT

## 2022-06-06 PROCEDURE — 82728 ASSAY OF FERRITIN: CPT

## 2022-06-06 PROCEDURE — 83090 ASSAY OF HOMOCYSTEINE: CPT

## 2022-06-06 PROCEDURE — 86376 MICROSOMAL ANTIBODY EACH: CPT

## 2022-06-06 PROCEDURE — 84146 ASSAY OF PROLACTIN: CPT

## 2022-06-06 PROCEDURE — 83550 IRON BINDING TEST: CPT

## 2022-06-06 PROCEDURE — 82607 VITAMIN B-12: CPT

## 2022-06-06 PROCEDURE — 82043 UR ALBUMIN QUANTITATIVE: CPT

## 2022-06-06 PROCEDURE — 84481 FREE ASSAY (FT-3): CPT

## 2022-06-07 LAB — THYROGLOB AB SERPL-ACNC: <0.9 IU/ML (ref 0–4)

## 2022-06-08 LAB
IGF-I SERPL-MCNC: 182 NG/ML (ref 32–238)
IGF-I Z-SCORE SERPL: 1.1
PREG SERPL-MCNC: 108 NG/DL (ref 15–132)

## 2022-06-10 LAB — T3REVERSE SERPL-MCNC: 16 NG/DL (ref 9–27)

## 2022-06-14 ENCOUNTER — OFFICE VISIT (OUTPATIENT)
Dept: CARDIOLOGY | Facility: MEDICAL CENTER | Age: 67
End: 2022-06-14
Payer: MEDICARE

## 2022-06-14 VITALS
BODY MASS INDEX: 21.66 KG/M2 | RESPIRATION RATE: 14 BRPM | SYSTOLIC BLOOD PRESSURE: 122 MMHG | HEIGHT: 67 IN | OXYGEN SATURATION: 99 % | WEIGHT: 138 LBS | DIASTOLIC BLOOD PRESSURE: 82 MMHG | HEART RATE: 68 BPM

## 2022-06-14 DIAGNOSIS — I48.92 ATRIAL FLUTTER, UNSPECIFIED TYPE (HCC): ICD-10-CM

## 2022-06-14 PROCEDURE — 99213 OFFICE O/P EST LOW 20 MIN: CPT | Performed by: INTERNAL MEDICINE

## 2022-06-14 PROCEDURE — 93000 ELECTROCARDIOGRAM COMPLETE: CPT | Performed by: INTERNAL MEDICINE

## 2022-06-14 ASSESSMENT — FIBROSIS 4 INDEX: FIB4 SCORE: 2.19

## 2022-06-14 NOTE — PROGRESS NOTES
Arrhythmia Clinic Note (Established patient)    DOS: 6/14/2022    Chief complaint/Reason for consult: AFL    Interval History: 68 y/o F with typical flutter s/p ablation. Doing well but notes high cholesterol on testing.    ROS (+ highlighted in bold):  Constitutional: Fevers/chills/fatigue/weightloss  HEENT: Blurry vision/eye pain/sore throat/hearing loss  Respiratory: Shortness of breath/cough  Cardiovascular: Chest pain/palpitations/edema/orthopnea/syncope  GI: Nausea/vomitting/diarrhea  MSK: Arthralgias/myagias/muscle weakness  Skin: Rash/sores  Neurological: Numbness/tremors/vertigo  Endocrine: Excessive thirst/polyuria/cold intolerance/heat intolerance  Psych: Depression/anxiety    Past Medical History:   Diagnosis Date   • Atrial fibrillation (HCC)    • Atrial flutter (HCC)    • Back pain    • Chickenpox    • Cough    • Daytime sleepiness    • Depression    • Diabetes (HCC) 09/17/2021    Takes Metformin   • GERD (gastroesophageal reflux disease)    • Hoarseness, persistent    • Influenza    • Mumps    • Shortness of breath    • Skin change    • Sputum production    • Sweat, sweating, excessive    • Wears glasses        Past Surgical History:   Procedure Laterality Date   • HYSTERECTOMY LAPAROSCOPY     • HYSTERECTOMY LAPAROSCOPY     • MAMMOPLASTY AUGMENTATION Bilateral    • OTHER      EGD with Bravo Capsule Placed 8-.       Social History     Socioeconomic History   • Marital status:      Spouse name: Not on file   • Number of children: Not on file   • Years of education: Not on file   • Highest education level: Not on file   Occupational History   • Not on file   Tobacco Use   • Smoking status: Former Smoker     Packs/day: 0.50     Years: 30.00     Pack years: 15.00     Types: Cigarettes     Quit date: 2/1/2012     Years since quitting: 10.3   • Smokeless tobacco: Never Used   Vaping Use   • Vaping Use: Never used   Substance and Sexual Activity   • Alcohol use: Yes     Alcohol/week: 1.5 oz      "Types: 3 Glasses of wine per week   • Drug use: Not Currently     Frequency: 2.0 times per week     Types: Marijuana, Oral     Comment: twice a week   • Sexual activity: Not on file   Other Topics Concern   • Not on file   Social History Narrative   • Not on file     Social Determinants of Health     Financial Resource Strain: Not on file   Food Insecurity: Not on file   Transportation Needs: Not on file   Physical Activity: Not on file   Stress: Not on file   Social Connections: Not on file   Intimate Partner Violence: Not on file   Housing Stability: Not on file       Family History   Problem Relation Age of Onset   • Breast Cancer Mother         breast cancer 68   • Cancer Mother    • Lung Cancer Father    • Dementia Brother         vascular   • Stroke Brother    • Cancer Maternal Aunt         breast cancer       No Known Allergies    Current Outpatient Medications   Medication Sig Dispense Refill   • estradiol (ESTRACE) 0.1 MG/GM vaginal cream Insert 1 g into the vagina see administration instructions. Pt takes twice a week, no set days     • montelukast (SINGULAIR) 10 MG Tab Take 1 Tablet by mouth every evening.     • cetirizine (ZYRTEC) 10 MG Tab Take 10 mg by mouth every day.     • L-THEANINE PO Take 1 Capsule by mouth every day.     • magnesium oxide (MAG-OX) 400 MG Tab Take 400 mg by mouth every day.     • ALPRAZolam (XANAX) 0.5 MG Tab Take 0.25 mg by mouth 3 times a day as needed for Sleep or Anxiety.     • metformin (GLUCOPHAGE) 500 MG Tab Take 500 mg by mouth 2 times a day, with meals.       No current facility-administered medications for this visit.       Physical Exam:  Vitals:    06/14/22 0734   BP: 122/82   BP Location: Left arm   Patient Position: Sitting   BP Cuff Size: Adult   Pulse: 68   Resp: 14   SpO2: 99%   Weight: 62.6 kg (138 lb)   Height: 1.702 m (5' 7\")     General appearance: NAD, conversant   Eyes: anicteric sclerae, moist conjunctivae; no lid-lag; PERRLA  HENT: Atraumatic; oropharynx " clear with moist mucous membranes and no mucosal ulcerations; normal hard and soft palate  Neck: Trachea midline; FROM, supple, no thyromegaly or lymphadenopathy  Lungs: CTA, with normal respiratory effort and no intercostal retractions  CV: RRR, no MRGs, no JVD  Abdomen: Soft, non-tender; no masses or HSM  Extremities: No peripheral edema or extremity lymphadenopathy  Skin: Normal temperature, turgor and texture; no rash, ulcers or subcutaneous nodules  Psych: Appropriate affect, alert and oriented to person, place and time    Data:  Lipids:   Lab Results   Component Value Date/Time    CHOLSTRLTOT 252 (H) 06/06/2022 08:22 AM    TRIGLYCERIDE 197 (H) 06/06/2022 08:22 AM    HDL 52 06/06/2022 08:22 AM     (H) 06/06/2022 08:22 AM        BMP:  Lab Results   Component Value Date/Time    SODIUM 137 06/06/2022 0822    POTASSIUM 4.1 06/06/2022 0822    CHLORIDE 101 06/06/2022 0822    CO2 26 06/06/2022 0822    GLUCOSE 115 (H) 06/06/2022 0822    BUN 17 06/06/2022 0822    CREATININE 0.92 06/06/2022 0822    CALCIUM 9.5 06/06/2022 0822    ANION 10.0 06/06/2022 0822        TSH:   Lab Results   Component Value Date/Time    TSHULTRASEN 2.750 06/06/2022 0820        THYROXINE (T4):   No results found for: AURELIANOIR     CBC:   Lab Results   Component Value Date/Time    WBC 8.2 06/06/2022 08:22 AM    RBC 4.80 06/06/2022 08:22 AM    HEMOGLOBIN 15.3 06/06/2022 08:22 AM    HEMATOCRIT 46.1 06/06/2022 08:22 AM    MCV 96.0 06/06/2022 08:22 AM    MCH 31.9 06/06/2022 08:22 AM    MCHC 33.2 (L) 06/06/2022 08:22 AM    RDW 43.7 06/06/2022 08:22 AM    PLATELETCT 173 06/06/2022 08:22 AM    MPV 11.8 06/06/2022 08:22 AM    NEUTSPOLYS 67.00 06/06/2022 08:22 AM    LYMPHOCYTES 21.30 (L) 06/06/2022 08:22 AM    MONOCYTES 5.70 06/06/2022 08:22 AM    EOSINOPHILS 4.50 06/06/2022 08:22 AM    BASOPHILS 1.10 06/06/2022 08:22 AM    IMMGRAN 0.40 06/06/2022 08:22 AM    IMMGRAN 0 02/02/2017 06:48 AM    NRBC 0.00 06/06/2022 08:22 AM    NEUTS 5.49 06/06/2022 08:22  AM    NEUTS 2.8 02/02/2017 06:48 AM    LYMPHS 1.75 06/06/2022 08:22 AM    LYMPHS 1.5 02/02/2017 06:48 AM    MONOS 0.47 06/06/2022 08:22 AM    MONOS 0.3 02/02/2017 06:48 AM    EOS 0.37 06/06/2022 08:22 AM    EOS 0.2 02/02/2017 06:48 AM    BASO 0.09 06/06/2022 08:22 AM    BASO 0.1 02/02/2017 06:48 AM    IMMGRANAB 0.03 06/06/2022 08:22 AM    IMMGRANAB 0.0 02/02/2017 06:48 AM    NRBCAB 0.00 06/06/2022 08:22 AM        CBC w/o DIFF  Lab Results   Component Value Date/Time    WBC 8.2 06/06/2022 08:22 AM    RBC 4.80 06/06/2022 08:22 AM    HEMOGLOBIN 15.3 06/06/2022 08:22 AM    MCV 96.0 06/06/2022 08:22 AM    MCH 31.9 06/06/2022 08:22 AM    MCHC 33.2 (L) 06/06/2022 08:22 AM    RDW 43.7 06/06/2022 08:22 AM    MPV 11.8 06/06/2022 08:22 AM         EKG interpreted by me: NSR    Impression/Plan:  1. Atrial flutter, unspecified type (HCC)  EKG     1. Typical AFL s/p ablation  2. HLD    - She will follow up with her PCP for HLD  - AFL s/p ablation with no recurrence    Can see me PRN if recurrent arrhythmias    Junior Belcher MD  Cardiac Electrophysiology

## 2022-06-16 LAB — EKG IMPRESSION: NORMAL

## 2022-07-01 ENCOUNTER — TELEPHONE (OUTPATIENT)
Dept: HEALTH INFORMATION MANAGEMENT | Facility: OTHER | Age: 67
End: 2022-07-01
Payer: MEDICARE

## 2022-08-01 NOTE — PATIENT COMMUNICATION
Called patient back to advise to DC ASA when she starts Xarelto. She verbalizes understanding.    dietitian/nutrition services

## 2022-10-19 ENCOUNTER — OFFICE VISIT (OUTPATIENT)
Dept: MEDICAL GROUP | Facility: MEDICAL CENTER | Age: 67
End: 2022-10-19
Payer: MEDICARE

## 2022-10-19 VITALS
WEIGHT: 134 LBS | BODY MASS INDEX: 21.03 KG/M2 | DIASTOLIC BLOOD PRESSURE: 62 MMHG | TEMPERATURE: 97.8 F | RESPIRATION RATE: 12 BRPM | OXYGEN SATURATION: 97 % | HEIGHT: 67 IN | HEART RATE: 75 BPM | SYSTOLIC BLOOD PRESSURE: 118 MMHG

## 2022-10-19 DIAGNOSIS — E78.2 MIXED HYPERLIPIDEMIA: ICD-10-CM

## 2022-10-19 DIAGNOSIS — J44.89 ASTHMA-COPD OVERLAP SYNDROME (HCC): ICD-10-CM

## 2022-10-19 DIAGNOSIS — I48.92 ATRIAL FLUTTER, UNSPECIFIED TYPE (HCC): ICD-10-CM

## 2022-10-19 DIAGNOSIS — R73.9 HYPERGLYCEMIA: ICD-10-CM

## 2022-10-19 PROCEDURE — 99214 OFFICE O/P EST MOD 30 MIN: CPT | Performed by: FAMILY MEDICINE

## 2022-10-19 ASSESSMENT — PATIENT HEALTH QUESTIONNAIRE - PHQ9: CLINICAL INTERPRETATION OF PHQ2 SCORE: 0

## 2022-10-19 ASSESSMENT — FIBROSIS 4 INDEX: FIB4 SCORE: 2.19

## 2022-10-19 NOTE — ASSESSMENT & PLAN NOTE
Anaya reports her breathing has significantly improved s/p ablation for a-flutter. She follows with pulmonology.

## 2022-10-19 NOTE — ASSESSMENT & PLAN NOTE
HgbA1C 6.3 6/2022. Anaya was traveling to [place] frequently to care for her mother in law who passed recently. She notes her diet has not been as healthy. She is working out frequently - going to the gym and doing yoga.    She is on metformin 500mg BID.

## 2022-10-19 NOTE — ASSESSMENT & PLAN NOTE
Anaya is s/p ablation 11/1/2021 by Dr. Belcher. She reports she feels significantly better s/p ablation. No palpitations, chest pain, lightheadedness.

## 2022-10-19 NOTE — ASSESSMENT & PLAN NOTE
LDL elevated per below. Anaya is not on statin therapy and wishes to avoid medication.    Lab Results   Component Value Date/Time    CHOLSTRLTOT 252 (H) 06/06/2022 08:22 AM     (H) 06/06/2022 08:22 AM    HDL 52 06/06/2022 08:22 AM    TRIGLYCERIDE 197 (H) 06/06/2022 08:22 AM

## 2022-10-19 NOTE — PROGRESS NOTES
Subjective:     CC: follow up labs, chronic conditions    HPI:   Anaya presents today with:    Atrial flutter  Anaya is s/p ablation 11/1/2021 by Dr. Belcher. She reports she feels significantly better s/p ablation. No palpitations, chest pain, lightheadedness.    Hyperglycemia  HgbA1C 6.3 6/2022. Anaya was traveling to [place] frequently to care for her mother in law who passed recently. She notes her diet has not been as healthy. She is working out frequently - going to the gym and doing yoga.    She is on metformin 500mg BID.      Asthma-COPD overlap syndrome (HCC)  Anaya reports her breathing has significantly improved s/p ablation for a-flutter. She follows with pulmonology.    Mixed hyperlipidemia  LDL elevated per below. Anaya is not on statin therapy and wishes to avoid medication.    Lab Results   Component Value Date/Time    CHOLSTRLTOT 252 (H) 06/06/2022 08:22 AM     (H) 06/06/2022 08:22 AM    HDL 52 06/06/2022 08:22 AM    TRIGLYCERIDE 197 (H) 06/06/2022 08:22 AM         Past Medical History:   Diagnosis Date    Atrial fibrillation (HCC)     Atrial flutter (HCC)     Back pain     Chickenpox     Cough     Daytime sleepiness     Depression     Diabetes (HCC) 09/17/2021    Takes Metformin    GERD (gastroesophageal reflux disease)     Hoarseness, persistent     Influenza     Mumps     Shortness of breath     Skin change     Sputum production     Sweat, sweating, excessive     Wears glasses        Social History     Tobacco Use    Smoking status: Former     Packs/day: 0.50     Years: 30.00     Pack years: 15.00     Types: Cigarettes     Quit date: 2/1/2012     Years since quitting: 10.7    Smokeless tobacco: Never   Vaping Use    Vaping Use: Never used   Substance Use Topics    Alcohol use: Yes     Alcohol/week: 1.5 oz     Types: 3 Glasses of wine per week    Drug use: Not Currently     Frequency: 2.0 times per week     Types: Marijuana, Oral     Comment: twice a week       Current Outpatient  "Medications Ordered in Epic   Medication Sig Dispense Refill    estradiol (ESTRACE) 0.1 MG/GM vaginal cream Insert 1 g into the vagina see administration instructions. Pt takes twice a week, no set days      L-THEANINE PO Take 1 Capsule by mouth every day.      magnesium oxide (MAG-OX) 400 MG Tab Take 400 mg by mouth every day.      ALPRAZolam (XANAX) 0.5 MG Tab Take 0.25 mg by mouth 3 times a day as needed for Sleep or Anxiety.      metformin (GLUCOPHAGE) 500 MG Tab Take 500 mg by mouth 2 times a day, with meals.       No current Epic-ordered facility-administered medications on file.       Allergies:  Patient has no known allergies.    Health Maintenance: discussed tdap, she will get in the future as she recently had COVID booster and flu; she reports retinal is UTD    ROS:  Gen: no fevers/chills, no changes in weight  Eyes: no changes in vision  ENT: no sore throat, no hearing loss, no bloody nose  Pulm: no SOB  CV: no chest pain        Objective:       Exam:  /62 (BP Location: Left arm, Patient Position: Sitting, BP Cuff Size: Adult)   Pulse 75   Temp 36.6 °C (97.8 °F) (Temporal)   Resp 12   Ht 1.702 m (5' 7\")   Wt 60.8 kg (134 lb)   SpO2 97%   BMI 20.99 kg/m²  Body mass index is 20.99 kg/m².    Gen: Alert and oriented, No apparent distress  Lungs: Normal effort, CTA bilaterally, no wheezes, rhonchi, or rales  CV: Regular rate and rhythm, no murmurs, rubs, or gallops      Assessment & Plan:     67 y.o. female with the following -     1. Atrial flutter, unspecified type (HCC)  S/p ablation, CTM    2. Hyperglycemia  Anaya is motivated to improve her diet. She is on metformin 500mg BID  - HEMOGLOBIN A1C; Future    3. Asthma-COPD overlap syndrome (HCC)  Stable, CTM    4. Mixed hyperlipidemia  Anaya would like to manage with diet and avoid medication, discussed Mediterranean type diet    Patient notified I will be leaving [facility]. I encouraged the patient to establish with new [facility] PCP if she " desires.       Please note this dictation was created using voice recognition software. I have made every reasonable attempt to correct obvious errors, but I expect there may be errors of grammar, and possibly content, that I did not discover before finalizing the note.

## 2023-02-15 ENCOUNTER — TELEPHONE (OUTPATIENT)
Dept: HEALTH INFORMATION MANAGEMENT | Facility: OTHER | Age: 68
End: 2023-02-15
Payer: MEDICARE

## 2023-02-24 ENCOUNTER — HOSPITAL ENCOUNTER (OUTPATIENT)
Dept: RADIOLOGY | Facility: MEDICAL CENTER | Age: 68
End: 2023-02-24
Attending: CHIROPRACTOR
Payer: MEDICARE

## 2023-02-24 DIAGNOSIS — M99.03 SOMATIC DYSFUNCTION OF LUMBAR REGION: ICD-10-CM

## 2023-02-24 DIAGNOSIS — M99.04 SOMATIC DYSFUNCTION OF SACRAL REGION: ICD-10-CM

## 2023-02-24 PROCEDURE — 72170 X-RAY EXAM OF PELVIS: CPT

## 2023-02-24 PROCEDURE — 72100 X-RAY EXAM L-S SPINE 2/3 VWS: CPT

## 2023-03-29 ENCOUNTER — OFFICE VISIT (OUTPATIENT)
Dept: MEDICAL GROUP | Facility: PHYSICIAN GROUP | Age: 68
End: 2023-03-29
Payer: MEDICARE

## 2023-03-29 VITALS
HEIGHT: 68 IN | WEIGHT: 135 LBS | RESPIRATION RATE: 12 BRPM | DIASTOLIC BLOOD PRESSURE: 62 MMHG | TEMPERATURE: 98 F | SYSTOLIC BLOOD PRESSURE: 116 MMHG | OXYGEN SATURATION: 97 % | HEART RATE: 87 BPM | BODY MASS INDEX: 20.46 KG/M2

## 2023-03-29 DIAGNOSIS — I48.92 ATRIAL FLUTTER, UNSPECIFIED TYPE (HCC): ICD-10-CM

## 2023-03-29 DIAGNOSIS — G25.0 BENIGN ESSENTIAL TREMOR: ICD-10-CM

## 2023-03-29 DIAGNOSIS — R73.9 HYPERGLYCEMIA: ICD-10-CM

## 2023-03-29 DIAGNOSIS — J44.89 ASTHMA-COPD OVERLAP SYNDROME (HCC): ICD-10-CM

## 2023-03-29 DIAGNOSIS — F51.01 PRIMARY INSOMNIA: ICD-10-CM

## 2023-03-29 DIAGNOSIS — E78.2 MIXED HYPERLIPIDEMIA: ICD-10-CM

## 2023-03-29 PROBLEM — K21.9 GASTROESOPHAGEAL REFLUX DISEASE: Status: RESOLVED | Noted: 2020-02-26 | Resolved: 2023-03-29

## 2023-03-29 PROBLEM — R05.3 CHRONIC COUGH: Status: RESOLVED | Noted: 2020-09-09 | Resolved: 2023-03-29

## 2023-03-29 PROBLEM — J32.9 CHRONIC SINUSITIS: Status: RESOLVED | Noted: 2020-02-26 | Resolved: 2023-03-29

## 2023-03-29 PROCEDURE — 99215 OFFICE O/P EST HI 40 MIN: CPT | Performed by: INTERNAL MEDICINE

## 2023-03-29 RX ORDER — VITAMIN B COMPLEX
2000 TABLET ORAL DAILY
COMMUNITY

## 2023-03-29 RX ORDER — PREDNISOLONE ACETATE 10 MG/ML
SUSPENSION/ DROPS OPHTHALMIC
COMMUNITY
Start: 2023-03-23 | End: 2023-09-18

## 2023-03-29 RX ORDER — ALPRAZOLAM 0.5 MG/1
0.25 TABLET ORAL NIGHTLY PRN
Qty: 45 TABLET | Refills: 0 | Status: SHIPPED | OUTPATIENT
Start: 2023-03-29 | End: 2023-06-27

## 2023-03-29 RX ORDER — TURMERIC 400 MG
CAPSULE ORAL
COMMUNITY

## 2023-03-29 ASSESSMENT — FIBROSIS 4 INDEX: FIB4 SCORE: 2.23

## 2023-03-29 ASSESSMENT — PATIENT HEALTH QUESTIONNAIRE - PHQ9: CLINICAL INTERPRETATION OF PHQ2 SCORE: 0

## 2023-03-29 NOTE — PROGRESS NOTES
Subjective:     CC:  Establish care    HISTORY OF THE PRESENT ILLNESS: Anaya Browne is a 68 y.o. female here today to establish primary medical care and discuss the below stated chronic medical conditions. Anaya is unaccompanied for today's visit.    Problem   Primary Insomnia    She has some challenges with sleep.  She will fall asleep anywhere between 10 and 11 PM at night and wake up like clockwork at 5 30-5 45.  Sometimes to get a little bit more sleep so she can avoid napping in the afternoon she will take a low-dose of alprazolam.  She does this a few times per week if she needs to break the pattern of the insomnia.  No negative side effects from the medication.  She understands that this can be habit-forming and has potential for complications if it is used routinely.    Current regimen: alprazolam 0.25 mg as needed     Hyperglycemia    She has history of prediabetes and reports she was prescribed metformin about 10 years ago.  She has never had an A1c above 6.5.  She has been exercising more vigorously lately and trying to eat healthy.  She wonders if she needs to continue on the metformin.     Benign Essential Tremor    She has mild essential tremor noted for the past several years.  No parkinsonian features.  She met with Dr. Carey in 2020 and no additional work-up or medications were recommended at that time.  Is not interfering with her day-to-day life.     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 Cruz of pulmonary medicine in the past no improvement when using inhaled steroid-LABA.  Likely a component of her atrial fibrillation contributing to the breathlessness.  She is able to exercise without limitation.  Also has allergies contributing.  Currently managing without any formal medical therapy.     Mixed Hyperlipidemia     Latest Reference Range & Units 06/06/22 08:22   Cholesterol,Tot 100 - 199 mg/dL 252 (H)   Triglycerides 0 -  149 mg/dL 197 (H)   HDL >=40 mg/dL 52   LDL <100 mg/dL 161 (H)     The 10-year ASCVD risk score (Hafsa DK, et al., 2019) is: 6.4%     She has a brother with vascular dementia and is concerned whether her cholesterol would be contributing to future risk for herself.  She be interested in a CT cardiac score.  Her 10-year ASCVD risk score is low and there is no known history of clinical atherosclerosis.     Atrial Flutter (Hcc)    She had been dealing with symptoms related to atrial fibrillation causing visits to the ER and multiple medications with intolerable side effects.  She finally underwent ablation in November 2021 with excellent results.  She has not had any subsequent palpitations and has been able to exercise vigorously.  No longer on medical therapy for heart rhythm.          Current Medications:  Current Outpatient Medications Ordered in Epic   Medication Sig Dispense Refill    prednisoLONE acetate (PRED FORTE) 1 % Suspension INSTILL 1 DROP INTO RIGHT EYE TWICE DAILY FOR 14 DAYS      Turmeric 400 MG Cap Take  by mouth.      vitamin D3 (CHOLECALCIFEROL) 1000 Unit (25 mcg) Tab Take 1,000 Units by mouth every day.      ALPRAZolam (XANAX) 0.5 MG Tab Take 0.5 Tablets by mouth at bedtime as needed for Sleep or Anxiety for up to 90 days. 45 Tablet 0    estradiol (ESTRACE) 0.1 MG/GM vaginal cream Insert 1 g into the vagina see administration instructions. Pt takes twice a week, no set days      L-THEANINE PO Take 1 Capsule by mouth every day.      magnesium oxide (MAG-OX) 400 MG Tab Take 400 mg by mouth every day.      metformin (GLUCOPHAGE) 500 MG Tab Take 500 mg by mouth 2 times a day, with meals.       No current Epic-ordered facility-administered medications on file.       PMH, PSH, Social History, Medications, Allergies, FMH updated and reviewed as documented:    Objective:   Physical Exam:    Vitals: /62 (BP Location: Right arm, Patient Position: Sitting, BP Cuff Size: Adult)   Pulse 87   Temp 36.7  "°C (98 °F) (Temporal)   Resp 12   Ht 1.721 m (5' 7.75\")   Wt 61.2 kg (135 lb)   SpO2 97%    BMI: Body mass index is 20.68 kg/m².  Physical Exam  Constitutional:       General: She is not in acute distress.     Appearance: Normal appearance. She is normal weight. She is not ill-appearing.   HENT:      Right Ear: Ear canal and external ear normal. There is no impacted cerumen.      Left Ear: Ear canal and external ear normal. There is no impacted cerumen.   Eyes:      General: No scleral icterus.     Conjunctiva/sclera: Conjunctivae normal.   Neck:      Vascular: No carotid bruit.   Cardiovascular:      Rate and Rhythm: Normal rate and regular rhythm.      Pulses: Normal pulses.   Pulmonary:      Effort: Pulmonary effort is normal. No respiratory distress.      Breath sounds: No wheezing, rhonchi or rales.   Abdominal:      General: Bowel sounds are normal. There is no distension.      Palpations: Abdomen is soft.      Tenderness: There is no abdominal tenderness.   Musculoskeletal:      Right lower leg: No edema.      Left lower leg: No edema.   Lymphadenopathy:      Cervical: No cervical adenopathy.   Skin:     Findings: No bruising or rash.      Comments: Delayed capillary refill in toes, ~6 seconds   Neurological:      Gait: Gait normal.   Psychiatric:         Mood and Affect: Mood normal.         Behavior: Behavior normal.         Thought Content: Thought content normal.         Judgment: Judgment normal.        Assessment & Plan:   Anaya is a 68 y.o. female with the following:  Problem List Items Addressed This Visit       Asthma-COPD overlap syndrome (HCC)     Chronic ongoing problem, suspect there may be a mild component of asthma.  I do not feel like she is smoked long enough to have true COPD however her PFTs were consistent with mild obstruction.  Continue observing without pharmacotherapy.  Previously she was on Singulair as well as inhaled steroid-LABA.  She has worked with Dr. Cruz of pulmonary " medicine in the past.  Currently symptoms are well controlled so continue to observe at this time.         Atrial flutter (HCC)     Previous problem, status post ablation by electrophysiology in November 2021 with good results.  Can now follow-up as needed with cardiology.  No palpitations or concerns at this time.         Benign essential tremor     Chronic stable problem, continue with observation at this time, could consider medical therapy if it would start impacting her fine motor abilities.         Hyperglycemia     Chronic ongoing problem, suspect this will be improved with recent lifestyle improvements, update A1c and continue metformin 500 mg twice daily in the meantime.  She is not a diabetic.         Relevant Orders    HEMOGLOBIN A1C    Mixed hyperlipidemia     Chronic ongoing problem, due to family history of vascular dementia and her brother I think it be a good idea to get a baseline.  We will proceed with CT cardiac score to look at overall atherosclerotic burden to determine trajectory and how aggressive we should be in controlling her cholesterol.         Relevant Orders    CT-CARDIAC SCORING    CBC WITH DIFFERENTIAL    Comp Metabolic Panel    Lipid Profile    VITAMIN D,25 HYDROXY (DEFICIENCY)    VITAMIN B12    TSH    FREE THYROXINE    Primary insomnia     Chronic ongoing problem, continue alprazolam 0.25 mg as needed for refractory insomnia.  Try to limit use is much as possible due to potential long-term complications including increased fall risk, increased risk of dementia, sedation, and building up tolerance.    Obtained and reviewed patient utilization report from [facility] pharmacy database on 3/29/2023 6:11 PM  prior to writing prescription for controlled substance II, III or IV per [place] bill . Based on assessment of the report, the prescription is medically necessary.     Patient understands this prescription is a controlled substance which is potentially habit-forming and its use  "is regulated by the [place]. We also discussed the new \"black box\" warning regarding the lethal combination of opioids and benzodiazepines. Refills are subject to terms of a controlled substance agreement and patient has an updated one on file. Most recent UDS is appropriate. Any refill requires an office visit. Narcotics have may adverse effects and the risks of addiction, accidental overdose and death were emphasized. Provided prescriptions for the next three months.         Relevant Medications    ALPRAZolam (XANAX) 0.5 MG Tab    Other Relevant Orders    Controlled Substance Treatment Agreement        RTC: Return in about 3 months (around 6/29/2023).    I spent a total of 50 minutes with record review, exam, communication with the patient, communication with other providers, and documentation of this encounter.    PLEASE NOTE: This dictation was created using voice recognition software. I have made every reasonable attempt to correct obvious errors, but I expect that there are errors of grammar and possibly content that I did not discover before finalizing the note.      Alyssa Rodriguez, DO  Geriatric and Internal Medicine  [facility]      "

## 2023-03-30 NOTE — ASSESSMENT & PLAN NOTE
Chronic ongoing problem, suspect this will be improved with recent lifestyle improvements, update A1c and continue metformin 500 mg twice daily in the meantime.  She is not a diabetic.

## 2023-03-30 NOTE — ASSESSMENT & PLAN NOTE
Chronic ongoing problem, due to family history of vascular dementia and her brother I think it be a good idea to get a baseline.  We will proceed with CT cardiac score to look at overall atherosclerotic burden to determine trajectory and how aggressive we should be in controlling her cholesterol.

## 2023-03-30 NOTE — ASSESSMENT & PLAN NOTE
Chronic ongoing problem, suspect there may be a mild component of asthma.  I do not feel like she is smoked long enough to have true COPD however her PFTs were consistent with mild obstruction.  Continue observing without pharmacotherapy.  Previously she was on Singulair as well as inhaled steroid-LABA.  She has worked with Dr. Cruz of pulmonary medicine in the past.  Currently symptoms are well controlled so continue to observe at this time.

## 2023-03-30 NOTE — ASSESSMENT & PLAN NOTE
Previous problem, status post ablation by electrophysiology in November 2021 with good results.  Can now follow-up as needed with cardiology.  No palpitations or concerns at this time.

## 2023-03-30 NOTE — ASSESSMENT & PLAN NOTE
Chronic stable problem, continue with observation at this time, could consider medical therapy if it would start impacting her fine motor abilities.

## 2023-03-30 NOTE — ASSESSMENT & PLAN NOTE
"Chronic ongoing problem, continue alprazolam 0.25 mg as needed for refractory insomnia.  Try to limit use is much as possible due to potential long-term complications including increased fall risk, increased risk of dementia, sedation, and building up tolerance.    Obtained and reviewed patient utilization report from [facility] pharmacy database on 3/29/2023 6:11 PM  prior to writing prescription for controlled substance II, III or IV per [place] bill . Based on assessment of the report, the prescription is medically necessary.     Patient understands this prescription is a controlled substance which is potentially habit-forming and its use is regulated by the [place]. We also discussed the new \"black box\" warning regarding the lethal combination of opioids and benzodiazepines. Refills are subject to terms of a controlled substance agreement and patient has an updated one on file. Most recent UDS is appropriate. Any refill requires an office visit. Narcotics have may adverse effects and the risks of addiction, accidental overdose and death were emphasized. Provided prescriptions for the next three months.  "

## 2023-04-04 ENCOUNTER — HOSPITAL ENCOUNTER (OUTPATIENT)
Dept: RADIOLOGY | Facility: MEDICAL CENTER | Age: 68
End: 2023-04-04
Attending: INTERNAL MEDICINE
Payer: COMMERCIAL

## 2023-04-04 DIAGNOSIS — E78.2 MIXED HYPERLIPIDEMIA: ICD-10-CM

## 2023-04-04 PROCEDURE — 4410556 CT-CARDIAC SCORING (SELF PAY ONLY)

## 2023-04-06 PROBLEM — Z86.79 PERSONAL HISTORY OF ATRIAL FLUTTER: Status: ACTIVE | Noted: 2023-04-06

## 2023-04-06 PROBLEM — F41.9 ANXIETY: Status: ACTIVE | Noted: 2023-04-06

## 2023-04-06 PROBLEM — M85.89 OSTEOPENIA OF MULTIPLE SITES: Status: ACTIVE | Noted: 2023-04-06

## 2023-04-06 PROBLEM — F13.20 BENZODIAZEPINE DEPENDENCE, EPISODIC (HCC): Status: ACTIVE | Noted: 2023-04-06

## 2023-04-06 PROBLEM — I73.9 PAD (PERIPHERAL ARTERY DISEASE) (HCC): Status: ACTIVE | Noted: 2023-04-06

## 2023-04-06 PROBLEM — I70.0 AORTIC ATHEROSCLEROSIS (HCC): Status: ACTIVE | Noted: 2023-04-06

## 2023-04-06 PROBLEM — R73.03 PREDIABETES: Status: ACTIVE | Noted: 2023-04-06

## 2023-04-19 ENCOUNTER — HOSPITAL ENCOUNTER (OUTPATIENT)
Dept: RADIOLOGY | Facility: MEDICAL CENTER | Age: 68
End: 2023-04-19
Attending: OBSTETRICS & GYNECOLOGY
Payer: MEDICARE

## 2023-04-19 DIAGNOSIS — Z13.820 ENCOUNTER FOR SCREENING FOR OSTEOPOROSIS: ICD-10-CM

## 2023-04-19 PROCEDURE — 77080 DXA BONE DENSITY AXIAL: CPT

## 2023-05-30 ENCOUNTER — HOSPITAL ENCOUNTER (OUTPATIENT)
Dept: RADIOLOGY | Facility: MEDICAL CENTER | Age: 68
End: 2023-05-30
Attending: FAMILY MEDICINE
Payer: MEDICARE

## 2023-05-30 DIAGNOSIS — Z12.31 VISIT FOR SCREENING MAMMOGRAM: ICD-10-CM

## 2023-05-30 PROCEDURE — 77063 BREAST TOMOSYNTHESIS BI: CPT

## 2023-08-18 ENCOUNTER — HOSPITAL ENCOUNTER (OUTPATIENT)
Dept: LAB | Facility: MEDICAL CENTER | Age: 68
End: 2023-08-18
Attending: INTERNAL MEDICINE
Payer: MEDICARE

## 2023-08-18 DIAGNOSIS — E78.2 MIXED HYPERLIPIDEMIA: ICD-10-CM

## 2023-08-18 LAB
25(OH)D3 SERPL-MCNC: 56 NG/ML (ref 30–100)
ALBUMIN SERPL BCP-MCNC: 4.4 G/DL (ref 3.2–4.9)
ALBUMIN/GLOB SERPL: 2.1 G/DL
ALP SERPL-CCNC: 83 U/L (ref 30–99)
ALT SERPL-CCNC: 11 U/L (ref 2–50)
ANION GAP SERPL CALC-SCNC: 9 MMOL/L (ref 7–16)
AST SERPL-CCNC: 16 U/L (ref 12–45)
BASOPHILS # BLD AUTO: 1.1 % (ref 0–1.8)
BASOPHILS # BLD: 0.07 K/UL (ref 0–0.12)
BILIRUB SERPL-MCNC: 0.2 MG/DL (ref 0.1–1.5)
BUN SERPL-MCNC: 21 MG/DL (ref 8–22)
CALCIUM ALBUM COR SERPL-MCNC: 9.1 MG/DL (ref 8.5–10.5)
CALCIUM SERPL-MCNC: 9.4 MG/DL (ref 8.5–10.5)
CHLORIDE SERPL-SCNC: 102 MMOL/L (ref 96–112)
CHOLEST SERPL-MCNC: 239 MG/DL (ref 100–199)
CO2 SERPL-SCNC: 28 MMOL/L (ref 20–33)
CORTIS SERPL-MCNC: 21.2 UG/DL (ref 0–23)
CREAT SERPL-MCNC: 0.95 MG/DL (ref 0.5–1.4)
CRP SERPL HS-MCNC: 0.8 MG/L (ref 0–3)
DHEA-S SERPL-MCNC: 119 UG/DL (ref 9.4–246)
EOSINOPHIL # BLD AUTO: 0.3 K/UL (ref 0–0.51)
EOSINOPHIL NFR BLD: 4.7 % (ref 0–6.9)
ERYTHROCYTE [DISTWIDTH] IN BLOOD BY AUTOMATED COUNT: 45.7 FL (ref 35.9–50)
EST. AVERAGE GLUCOSE BLD GHB EST-MCNC: 137 MG/DL
ESTRADIOL SERPL-MCNC: 33.1 PG/ML
FASTING STATUS PATIENT QL REPORTED: NORMAL
FERRITIN SERPL-MCNC: 41 NG/ML (ref 10–291)
GFR SERPLBLD CREATININE-BSD FMLA CKD-EPI: 65 ML/MIN/1.73 M 2
GLOBULIN SER CALC-MCNC: 2.1 G/DL (ref 1.9–3.5)
GLUCOSE SERPL-MCNC: 118 MG/DL (ref 65–99)
HBA1C MFR BLD: 6.4 % (ref 4–5.6)
HCT VFR BLD AUTO: 48.4 % (ref 37–47)
HCYS SERPL-SCNC: 10.17 UMOL/L
HDLC SERPL-MCNC: 50 MG/DL
HGB BLD-MCNC: 15.3 G/DL (ref 12–16)
IMM GRANULOCYTES # BLD AUTO: 0.02 K/UL (ref 0–0.11)
IMM GRANULOCYTES NFR BLD AUTO: 0.3 % (ref 0–0.9)
IRON SATN MFR SERPL: 23 % (ref 15–55)
IRON SERPL-MCNC: 75 UG/DL (ref 40–170)
LDLC SERPL CALC-MCNC: 167 MG/DL
LYMPHOCYTES # BLD AUTO: 1.76 K/UL (ref 1–4.8)
LYMPHOCYTES NFR BLD: 27.8 % (ref 22–41)
MAGNESIUM SERPL-MCNC: 2.1 MG/DL (ref 1.5–2.5)
MCH RBC QN AUTO: 30.2 PG (ref 27–33)
MCHC RBC AUTO-ENTMCNC: 31.6 G/DL (ref 32.2–35.5)
MCV RBC AUTO: 95.5 FL (ref 81.4–97.8)
MONOCYTES # BLD AUTO: 0.41 K/UL (ref 0–0.85)
MONOCYTES NFR BLD AUTO: 6.5 % (ref 0–13.4)
NEUTROPHILS # BLD AUTO: 3.76 K/UL (ref 1.82–7.42)
NEUTROPHILS NFR BLD: 59.6 % (ref 44–72)
NRBC # BLD AUTO: 0 K/UL
NRBC BLD-RTO: 0 /100 WBC (ref 0–0.2)
PLATELET # BLD AUTO: 215 K/UL (ref 164–446)
PMV BLD AUTO: 10.7 FL (ref 9–12.9)
POTASSIUM SERPL-SCNC: 4.4 MMOL/L (ref 3.6–5.5)
PROLACTIN SERPL-MCNC: 10.6 NG/ML (ref 2.8–26)
PROT SERPL-MCNC: 6.5 G/DL (ref 6–8.2)
RBC # BLD AUTO: 5.07 M/UL (ref 4.2–5.4)
SODIUM SERPL-SCNC: 139 MMOL/L (ref 135–145)
T3FREE SERPL-MCNC: 2.99 PG/ML (ref 2–4.4)
T4 FREE SERPL-MCNC: 1.15 NG/DL (ref 0.93–1.7)
TIBC SERPL-MCNC: 321 UG/DL (ref 250–450)
TRIGL SERPL-MCNC: 111 MG/DL (ref 0–149)
TSH SERPL DL<=0.005 MIU/L-ACNC: 2.38 UIU/ML (ref 0.38–5.33)
UIBC SERPL-MCNC: 246 UG/DL (ref 110–370)
VIT B12 SERPL-MCNC: 1771 PG/ML (ref 211–911)
WBC # BLD AUTO: 6.3 K/UL (ref 4.8–10.8)

## 2023-08-18 PROCEDURE — 83540 ASSAY OF IRON: CPT

## 2023-08-18 PROCEDURE — 83090 ASSAY OF HOMOCYSTEINE: CPT

## 2023-08-18 PROCEDURE — 36415 COLL VENOUS BLD VENIPUNCTURE: CPT

## 2023-08-18 PROCEDURE — 86800 THYROGLOBULIN ANTIBODY: CPT

## 2023-08-18 PROCEDURE — 84481 FREE ASSAY (FT-3): CPT

## 2023-08-18 PROCEDURE — 82533 TOTAL CORTISOL: CPT

## 2023-08-18 PROCEDURE — 84146 ASSAY OF PROLACTIN: CPT

## 2023-08-18 PROCEDURE — 83550 IRON BINDING TEST: CPT

## 2023-08-18 PROCEDURE — 86141 C-REACTIVE PROTEIN HS: CPT

## 2023-08-18 PROCEDURE — 84140 ASSAY OF PREGNENOLONE: CPT

## 2023-08-18 PROCEDURE — 84443 ASSAY THYROID STIM HORMONE: CPT

## 2023-08-18 PROCEDURE — 84305 ASSAY OF SOMATOMEDIN: CPT

## 2023-08-18 PROCEDURE — 84482 T3 REVERSE: CPT

## 2023-08-18 PROCEDURE — 84270 ASSAY OF SEX HORMONE GLOBUL: CPT

## 2023-08-18 PROCEDURE — 83036 HEMOGLOBIN GLYCOSYLATED A1C: CPT

## 2023-08-18 PROCEDURE — 82627 DEHYDROEPIANDROSTERONE: CPT

## 2023-08-18 PROCEDURE — 85025 COMPLETE CBC W/AUTO DIFF WBC: CPT

## 2023-08-18 PROCEDURE — 82306 VITAMIN D 25 HYDROXY: CPT

## 2023-08-18 PROCEDURE — 82607 VITAMIN B-12: CPT

## 2023-08-18 PROCEDURE — 84439 ASSAY OF FREE THYROXINE: CPT

## 2023-08-18 PROCEDURE — 83735 ASSAY OF MAGNESIUM: CPT

## 2023-08-18 PROCEDURE — 83525 ASSAY OF INSULIN: CPT

## 2023-08-18 PROCEDURE — 83704 LIPOPROTEIN BLD QUAN PART: CPT

## 2023-08-18 PROCEDURE — 80061 LIPID PANEL: CPT

## 2023-08-18 PROCEDURE — 80053 COMPREHEN METABOLIC PANEL: CPT

## 2023-08-18 PROCEDURE — 82670 ASSAY OF TOTAL ESTRADIOL: CPT

## 2023-08-18 PROCEDURE — 82728 ASSAY OF FERRITIN: CPT

## 2023-08-19 LAB
IGF-I SERPL-MCNC: 145 NG/ML (ref 30–235)
IGF-I Z-SCORE SERPL: 0.7
INSULIN P FAST SERPL-ACNC: 5 UIU/ML (ref 3–25)
SHBG SERPL-SCNC: 50 NMOL/L (ref 17–125)
THYROGLOB AB SERPL-ACNC: <0.9 IU/ML (ref 0–4)

## 2023-08-21 LAB
CHOLEST SERPL-MCNC: 247 MG/DL
HDL PARTICAL NO Q4363: 39.1 UMOL/L
HDL SIZE Q4361: 8.4 NM
HDLC SERPL-MCNC: 51 MG/DL (ref 40–59)
HLD.LARGE SERPL-SCNC: <2.8 UMOL/L
L VLDL PART NO Q4357: 3.4 NMOL/L
LDL SERPL QN: 21 NM
LDL SERPL-SCNC: 1851 NMOL/L
LDL SMALL SERPL-SCNC: 864 NMOL/L
LDLC SERPL CALC-MCNC: 173 MG/DL
PATHOLOGY STUDY: ABNORMAL
TRIGL SERPL-MCNC: 117 MG/DL (ref 30–149)
VLDL SIZE Q4362: 48.8 NM

## 2023-08-22 LAB
PREG SERPL-MCNC: 140 NG/DL (ref 15–132)
T3REVERSE SERPL-MCNC: 10.6 NG/DL (ref 9–27)

## 2023-09-18 ENCOUNTER — OFFICE VISIT (OUTPATIENT)
Dept: MEDICAL GROUP | Facility: PHYSICIAN GROUP | Age: 68
End: 2023-09-18
Payer: MEDICARE

## 2023-09-18 VITALS
OXYGEN SATURATION: 98 % | WEIGHT: 134.3 LBS | HEIGHT: 67 IN | DIASTOLIC BLOOD PRESSURE: 62 MMHG | BODY MASS INDEX: 21.08 KG/M2 | SYSTOLIC BLOOD PRESSURE: 114 MMHG | TEMPERATURE: 97.7 F | HEART RATE: 84 BPM | RESPIRATION RATE: 12 BRPM

## 2023-09-18 DIAGNOSIS — M85.89 OSTEOPENIA OF MULTIPLE SITES: ICD-10-CM

## 2023-09-18 DIAGNOSIS — Z23 NEED FOR VACCINATION: ICD-10-CM

## 2023-09-18 DIAGNOSIS — F41.9 ANXIETY: ICD-10-CM

## 2023-09-18 DIAGNOSIS — E78.2 MIXED HYPERLIPIDEMIA: ICD-10-CM

## 2023-09-18 DIAGNOSIS — R73.03 PREDIABETES: ICD-10-CM

## 2023-09-18 DIAGNOSIS — F13.20 BENZODIAZEPINE DEPENDENCE, EPISODIC (HCC): ICD-10-CM

## 2023-09-18 DIAGNOSIS — Z12.31 ENCOUNTER FOR SCREENING MAMMOGRAM FOR BREAST CANCER: ICD-10-CM

## 2023-09-18 DIAGNOSIS — I70.0 AORTIC ATHEROSCLEROSIS (HCC): ICD-10-CM

## 2023-09-18 PROCEDURE — 90662 IIV NO PRSV INCREASED AG IM: CPT | Performed by: INTERNAL MEDICINE

## 2023-09-18 PROCEDURE — 99214 OFFICE O/P EST MOD 30 MIN: CPT | Mod: 25 | Performed by: INTERNAL MEDICINE

## 2023-09-18 PROCEDURE — G0008 ADMIN INFLUENZA VIRUS VAC: HCPCS | Performed by: INTERNAL MEDICINE

## 2023-09-18 PROCEDURE — 3078F DIAST BP <80 MM HG: CPT | Performed by: INTERNAL MEDICINE

## 2023-09-18 PROCEDURE — 3074F SYST BP LT 130 MM HG: CPT | Performed by: INTERNAL MEDICINE

## 2023-09-18 RX ORDER — ALPRAZOLAM 0.25 MG/1
0.25 TABLET ORAL NIGHTLY PRN
COMMUNITY
End: 2023-09-18 | Stop reason: SDUPTHER

## 2023-09-18 RX ORDER — ALPRAZOLAM 0.5 MG/1
.25-.5 TABLET ORAL
Qty: 45 TABLET | Refills: 0 | Status: SHIPPED | OUTPATIENT
Start: 2023-09-18 | End: 2023-12-17

## 2023-09-18 ASSESSMENT — FIBROSIS 4 INDEX: FIB4 SCORE: 1.5

## 2023-09-18 NOTE — ASSESSMENT & PLAN NOTE
Chronic decompensated problem, advised her her A1c is increased and now she is borderline on type 2 diabetes.  She is taking metformin 500 mg twice daily and has been for some time to help keep her prediabetes stable.  Continue with periodic assessment to ensure stability.

## 2023-09-18 NOTE — ASSESSMENT & PLAN NOTE
Chronic ongoing problem, continues to use low-dose Xanax 0.25 mg as needed for severe anxiety that is not remedied with exercise, volunteering, mindfulness, deep breathing.  Prefers to stay off a daily medicine.  Denies any deleterious side effects.  Understands risk of ongoing use of this medicine.

## 2023-09-18 NOTE — ASSESSMENT & PLAN NOTE
Chronic ongoing problem, continue vitamin D minimum 2000 IU daily and calcium 1200 mg daily through diet and supplementation.  Continue with regular weightbearing exercise.  Next bone density due to be completed in April 2024.

## 2023-09-18 NOTE — PROGRESS NOTES
Subjective:   Chief Complaint/History of Present Illness:  Anaya Browne is a 68 y.o. female established patient who presents today to discuss medical problems as listed below. Anaya is unaccompanied for today's visit.    Problem   Aortic Atherosclerosis (Hcc)    Mild calcification of abdominal aorta on prior x-ray imaging.  Reassuring CT cardiac score in the past.  Prefers to treat with healthy lifestyle and avoid pharmacotherapy as able.     Osteopenia of Multiple Sites    Bone density and spring 2022 showed improvement in bone density since starting exercise especially in the lumbar spine but also marginally in the left hip.    Bone Density (4/2022):  lumbar spine T score of -1.0   proximal left femur T score of -1.7      When compared with the most recent study dated 3/20/2020, there has been an 11.1% increase in the bone mineral density of the lumbar spine and a 1.7% increase in the bone mineral density of the left femur.     IMPRESSION: According to the World Health Organization classification, bone mineral density of this patient is borderline osteopenic for the lumbar spine and osteopenic for the left femur. Increase in bone density in the lumbar spine since the prior exam is statistically significant. Increase in bone density in the left femur since the prior exam is not statistically significant.     10-year Probability of Fracture:  Major Osteoporotic     14.0%  Hip     1.9%     Prediabetes     Latest Reference Range & Units 08/18/23 06:12   Glycohemoglobin 4.0 - 5.6 % 6.4 (H)   Estim. Avg Glu mg/dL 137     She has longstanding history of elevated blood sugar.  She is treating preemptively with metformin 500 mg twice daily to help prevent transition into type 2 diabetes.  A1c has increased from 6.0 to 6.4 which she relates to dietary indiscretion.     Benzodiazepine Dependence, Episodic (Hcc)    She has utilized low-dose Xanax 0.25 mg as needed for anxiety since about 2019.  She was previously  using this daily and now has a dose on average once per week.  It is extremely effective at helping to reduce her anxiety and she denies any deleterious side effects.  She also tries to augment treatment with volunteering, exercising, and other nonpharmacologic approaches.  Also was recommended to try L theanine supplement daily but is unsure if it is doing much for her.     Anxiety    She has utilized low-dose Xanax 0.25 mg as needed for anxiety since about 2019.  She was previously using this daily and now has a dose on average once per week.  It is extremely effective at helping to reduce her anxiety and she denies any deleterious side effects.  She also tries to augment treatment with volunteering, exercising, and other nonpharmacologic approaches.  Also was recommended to try L theanine supplement daily but is unsure if it is doing much for her.       Mixed Hyperlipidemia     Latest Reference Range & Units 08/18/23 06:13   Cholesterol,Tot <=199 mg/dL  100 - 199 mg/dL 247 (H)  239 (H)   Triglycerides 30 - 149 mg/dL  0 - 149 mg/dL 117  111   HDL 40 - 59 mg/dL  >=40 mg/dL 51  50   EER LipoFit by NMR  See Note   LDL <100 mg/dL 167 (H)   LDL Cholesterol <=129 mg/dL 173 (H)     The 10-year ASCVD risk score (Hafsa MENDEZ, et al., 2019) is: 6.2%     CT cardiac score (2021):  Coronary calcification:  LMA - 0.0  LCX - 4.3  LAD - 19.1  RCA - 0.0  PDA - 0.0     Total Calcium Score: 23.4     Percentile: Calcium score is above the 25th percentile for the patient's age and sex.     IMPRESSION: Calcium Score of 1-99 AND <75th percentile: You have mild cholesterol (plaque) build-up in the arteries that supply blood flow to your heart.    She has a brother with vascular dementia and is concerned whether her cholesterol would be contributing to future risk for herself.  She be interested in a CT cardiac score.  Her 10-year ASCVD risk score is low.          Current Medications:  Current Outpatient Medications Ordered in Epic  "  Medication Sig Dispense Refill    ALPRAZolam (XANAX) 0.5 MG Tab Take 0.5-1 Tablets by mouth 1 time a day as needed for Anxiety for up to 90 days. 45 Tablet 0    famotidine (PEPCID) 20 MG Tab Take 20 mg by mouth every day. Indications: Gastroesophageal Reflux Disease      Turmeric 400 MG Cap Take  by mouth.      vitamin D3 (CHOLECALCIFEROL) 1000 Unit (25 mcg) Tab Take 2,000 Units by mouth every day.      estradiol (ESTRACE) 0.1 MG/GM vaginal cream Insert 1 g into the vagina see administration instructions. Pt takes twice a week, no set days      L-THEANINE PO Take 1 Capsule by mouth every day.      magnesium oxide (MAG-OX) 400 MG Tab Take 400 mg by mouth every day.      metformin (GLUCOPHAGE) 500 MG Tab Take 500 mg by mouth 2 times a day, with meals.       No current Epic-ordered facility-administered medications on file.          Objective:   Physical Exam:    Vitals: /62 (BP Location: Right arm, Patient Position: Sitting, BP Cuff Size: Adult)   Pulse 84   Temp 36.5 °C (97.7 °F) (Temporal)   Resp 12   Ht 1.702 m (5' 7\")   Wt 60.9 kg (134 lb 4.8 oz)   SpO2 98%    BMI: Body mass index is 21.03 kg/m².  Physical Exam  Constitutional:       General: She is not in acute distress.     Appearance: Normal appearance. She is not ill-appearing.   HENT:      Right Ear: Ear canal and external ear normal. There is no impacted cerumen.      Left Ear: Ear canal and external ear normal. There is no impacted cerumen.   Eyes:      General: No scleral icterus.     Conjunctiva/sclera: Conjunctivae normal.   Cardiovascular:      Rate and Rhythm: Normal rate and regular rhythm.      Pulses: Normal pulses.      Heart sounds: No murmur heard.  Pulmonary:      Effort: Pulmonary effort is normal. No respiratory distress.      Breath sounds: No wheezing, rhonchi or rales.   Musculoskeletal:      Right lower leg: No edema.      Left lower leg: No edema.   Lymphadenopathy:      Cervical: No cervical adenopathy.   Skin:     General: " "Skin is warm and dry.      Findings: No rash.   Neurological:      Gait: Gait normal.   Psychiatric:         Mood and Affect: Mood normal.         Behavior: Behavior normal.         Thought Content: Thought content normal.         Judgment: Judgment normal.               Assessment and Plan:   Anaya is a 68 y.o. female with the following:  Problem List Items Addressed This Visit       Anxiety     Chronic ongoing problem, continues to use low-dose Xanax 0.25 mg as needed for severe anxiety that is not remedied with exercise, volunteering, mindfulness, deep breathing.  Prefers to stay off a daily medicine.  Denies any deleterious side effects.  Understands risk of ongoing use of this medicine.    Obtained and reviewed patient utilization report from [facility] pharmacy database on 9/18/2023 1:44 PM  prior to writing prescription for controlled substance II, III or IV per [place] bill . Based on assessment of the report, the prescription is medically necessary.     Patient understands this prescription is a controlled substance which is potentially habit-forming and its use is regulated by the [place]. We also discussed the new \"black box\" warning regarding the lethal combination of opioids and benzodiazepines. Refills are subject to terms of a controlled substance agreement and patient has an updated one on file. Most recent UDS is appropriate. Any refill requires an office visit. Narcotics have may adverse effects and the risks of addiction, accidental overdose and death were emphasized. Provided prescriptions for the next three months.           Relevant Medications    ALPRAZolam (XANAX) 0.5 MG Tab    Aortic atherosclerosis (HCC)     Chronic stable problem, continue with vigorous exercise and healthy dietary choices, declines pharmacotherapy at this time.         Benzodiazepine dependence, episodic (HCC)     Chronic ongoing problem, continues to use low-dose Xanax 0.25 mg as needed for severe anxiety that is not " remedied with exercise, volunteering, mindfulness, deep breathing.  Prefers to stay off a daily medicine.  Denies any deleterious side effects.  Understands risk of ongoing use of this medicine.         Mixed hyperlipidemia     Chronic ongoing problem.  Continues to have favorable HDL with high running LDL.  Chronic in nature.  Exercising much more vigorously now 3 days/week with a .  CT cardiac score reassuring with minimal amount of plaque in 2021.  She prefers to monitor off pharmacotherapy.         Osteopenia of multiple sites     Chronic ongoing problem, continue vitamin D minimum 2000 IU daily and calcium 1200 mg daily through diet and supplementation.  Continue with regular weightbearing exercise.  Next bone density due to be completed in April 2024.         Relevant Orders    DS-BONE DENSITY STUDY (DEXA)    Prediabetes     Chronic decompensated problem, advised her her A1c is increased and now she is borderline on type 2 diabetes.  She is taking metformin 500 mg twice daily and has been for some time to help keep her prediabetes stable.  Continue with periodic assessment to ensure stability.          Other Visit Diagnoses       Need for vaccination        Relevant Orders    Influenza Vaccine, High Dose (65+ Only) (Completed)    Encounter for screening mammogram for breast cancer        Relevant Orders    MA-SCREENING MAMMO BILAT W/IMPLANTS W/FIOR W/CAD               RTC: Return in about 1 year (around 9/18/2024).    I spent a total of 32 minutes with record review, exam, communication with the patient, communication with other providers, and documentation of this encounter.    PLEASE NOTE: This dictation was created using voice recognition software. I have made every reasonable attempt to correct obvious errors, but I expect that there are errors of grammar and possibly content that I did not discover before finalizing the note.      Alyssa Rodriguez, DO  Geriatric and Internal  Medicine  [facility]

## 2023-09-18 NOTE — ASSESSMENT & PLAN NOTE
Chronic ongoing problem.  Continues to have favorable HDL with high running LDL.  Chronic in nature.  Exercising much more vigorously now 3 days/week with a .  CT cardiac score reassuring with minimal amount of plaque in 2021.  She prefers to monitor off pharmacotherapy.

## 2023-09-18 NOTE — ASSESSMENT & PLAN NOTE
Chronic stable problem, continue with vigorous exercise and healthy dietary choices, declines pharmacotherapy at this time.

## 2023-09-18 NOTE — ASSESSMENT & PLAN NOTE
"Chronic ongoing problem, continues to use low-dose Xanax 0.25 mg as needed for severe anxiety that is not remedied with exercise, volunteering, mindfulness, deep breathing.  Prefers to stay off a daily medicine.  Denies any deleterious side effects.  Understands risk of ongoing use of this medicine.    Obtained and reviewed patient utilization report from [facility] pharmacy database on 9/18/2023 1:44 PM  prior to writing prescription for controlled substance II, III or IV per [place] bill . Based on assessment of the report, the prescription is medically necessary.     Patient understands this prescription is a controlled substance which is potentially habit-forming and its use is regulated by the [place]. We also discussed the new \"black box\" warning regarding the lethal combination of opioids and benzodiazepines. Refills are subject to terms of a controlled substance agreement and patient has an updated one on file. Most recent UDS is appropriate. Any refill requires an office visit. Narcotics have may adverse effects and the risks of addiction, accidental overdose and death were emphasized. Provided prescriptions for the next three months.    "

## 2024-01-31 ENCOUNTER — APPOINTMENT (RX ONLY)
Dept: URBAN - METROPOLITAN AREA CLINIC 6 | Facility: CLINIC | Age: 69
Setting detail: DERMATOLOGY
End: 2024-01-31

## 2024-01-31 DIAGNOSIS — D18.0 HEMANGIOMA: ICD-10-CM

## 2024-01-31 DIAGNOSIS — L30.9 DERMATITIS, UNSPECIFIED: ICD-10-CM | Status: RESOLVING

## 2024-01-31 DIAGNOSIS — D485 NEOPLASM OF UNCERTAIN BEHAVIOR OF SKIN: ICD-10-CM

## 2024-01-31 DIAGNOSIS — L82.1 OTHER SEBORRHEIC KERATOSIS: ICD-10-CM

## 2024-01-31 DIAGNOSIS — L57.0 ACTINIC KERATOSIS: ICD-10-CM

## 2024-01-31 PROBLEM — D48.5 NEOPLASM OF UNCERTAIN BEHAVIOR OF SKIN: Status: ACTIVE | Noted: 2024-01-31

## 2024-01-31 PROBLEM — D18.01 HEMANGIOMA OF SKIN AND SUBCUTANEOUS TISSUE: Status: ACTIVE | Noted: 2024-01-31

## 2024-01-31 PROCEDURE — ? BIOPSY BY SHAVE METHOD

## 2024-01-31 PROCEDURE — ? DIAGNOSIS COMMENT

## 2024-01-31 PROCEDURE — ? LIQUID NITROGEN

## 2024-01-31 PROCEDURE — ? COUNSELING

## 2024-01-31 PROCEDURE — 99203 OFFICE O/P NEW LOW 30 MIN: CPT | Mod: 25

## 2024-01-31 PROCEDURE — 17000 DESTRUCT PREMALG LESION: CPT | Mod: 59

## 2024-01-31 PROCEDURE — 11102 TANGNTL BX SKIN SINGLE LES: CPT

## 2024-01-31 ASSESSMENT — LOCATION SIMPLE DESCRIPTION DERM
LOCATION SIMPLE: NOSE
LOCATION SIMPLE: LEFT FOREARM
LOCATION SIMPLE: RIGHT UPPER BACK
LOCATION SIMPLE: ABDOMEN

## 2024-01-31 ASSESSMENT — LOCATION DETAILED DESCRIPTION DERM
LOCATION DETAILED: NASAL DORSUM
LOCATION DETAILED: RIGHT MID-UPPER BACK
LOCATION DETAILED: LEFT LATERAL ABDOMEN
LOCATION DETAILED: LEFT DISTAL DORSAL FOREARM
LOCATION DETAILED: RIGHT LATERAL ABDOMEN

## 2024-01-31 ASSESSMENT — LOCATION ZONE DERM
LOCATION ZONE: TRUNK
LOCATION ZONE: NOSE
LOCATION ZONE: ARM

## 2024-01-31 NOTE — PROCEDURE: DIAGNOSIS COMMENT
Render Risk Assessment In Note?: no
Detail Level: Simple
Comment: Favor diagnosis of HSV will recheck in 6 weeks.

## 2024-01-31 NOTE — HPI: SKIN LESIONS
Is This A New Presentation, Or A Follow-Up?: Skin Lesions
How Severe Is Your Skin Lesion?: moderate
Have Your Skin Lesions Been Treated?: not been treated
Additional History: Patient is here for a lesion on her back and nose. She is a new patient and establishing care.
Which Family Member (Optional)?: Father

## 2024-03-19 ENCOUNTER — APPOINTMENT (RX ONLY)
Dept: URBAN - METROPOLITAN AREA CLINIC 6 | Facility: CLINIC | Age: 69
Setting detail: DERMATOLOGY
End: 2024-03-19

## 2024-03-19 DIAGNOSIS — L57.0 ACTINIC KERATOSIS: ICD-10-CM

## 2024-03-19 PROCEDURE — 99213 OFFICE O/P EST LOW 20 MIN: CPT

## 2024-03-19 PROCEDURE — ? DIAGNOSIS COMMENT

## 2024-03-19 PROCEDURE — ? COUNSELING

## 2024-03-19 ASSESSMENT — LOCATION ZONE DERM: LOCATION ZONE: NOSE

## 2024-03-19 ASSESSMENT — LOCATION SIMPLE DESCRIPTION DERM: LOCATION SIMPLE: NOSE

## 2024-03-19 ASSESSMENT — LOCATION DETAILED DESCRIPTION DERM: LOCATION DETAILED: NASAL DORSUM

## 2024-03-19 NOTE — PROCEDURE: DIAGNOSIS COMMENT
Detail Level: Simple
Render Risk Assessment In Note?: no
Comment: Fully resolved since LN2 treatment last month

## 2024-06-03 ENCOUNTER — APPOINTMENT (OUTPATIENT)
Dept: RADIOLOGY | Facility: MEDICAL CENTER | Age: 69
End: 2024-06-03
Attending: INTERNAL MEDICINE
Payer: MEDICARE

## 2024-06-11 ENCOUNTER — HOSPITAL ENCOUNTER (OUTPATIENT)
Dept: RADIOLOGY | Facility: MEDICAL CENTER | Age: 69
End: 2024-06-11
Attending: INTERNAL MEDICINE
Payer: MEDICARE

## 2024-06-11 ENCOUNTER — TELEPHONE (OUTPATIENT)
Dept: HEALTH INFORMATION MANAGEMENT | Facility: OTHER | Age: 69
End: 2024-06-11

## 2024-06-11 DIAGNOSIS — Z12.31 ENCOUNTER FOR SCREENING MAMMOGRAM FOR BREAST CANCER: ICD-10-CM

## 2024-06-11 PROCEDURE — 77063 BREAST TOMOSYNTHESIS BI: CPT

## 2024-08-26 DIAGNOSIS — R73.03 PREDIABETES: ICD-10-CM

## 2024-08-26 DIAGNOSIS — E78.2 MIXED HYPERLIPIDEMIA: ICD-10-CM

## 2024-08-29 ENCOUNTER — HOSPITAL ENCOUNTER (OUTPATIENT)
Dept: LAB | Facility: MEDICAL CENTER | Age: 69
End: 2024-08-29
Attending: INTERNAL MEDICINE
Payer: MEDICARE

## 2024-08-29 DIAGNOSIS — E78.2 MIXED HYPERLIPIDEMIA: ICD-10-CM

## 2024-08-29 LAB
25(OH)D3 SERPL-MCNC: 53 NG/ML (ref 30–100)
ALBUMIN SERPL BCP-MCNC: 4.1 G/DL (ref 3.2–4.9)
ALBUMIN/GLOB SERPL: 1.6 G/DL
ALP SERPL-CCNC: 71 U/L (ref 30–99)
ALT SERPL-CCNC: 14 U/L (ref 2–50)
ANION GAP SERPL CALC-SCNC: 11 MMOL/L (ref 7–16)
AST SERPL-CCNC: 27 U/L (ref 12–45)
BASOPHILS # BLD AUTO: 1.4 % (ref 0–1.8)
BASOPHILS # BLD: 0.07 K/UL (ref 0–0.12)
BILIRUB SERPL-MCNC: 0.4 MG/DL (ref 0.1–1.5)
BUN SERPL-MCNC: 18 MG/DL (ref 8–22)
CALCIUM ALBUM COR SERPL-MCNC: 9.4 MG/DL (ref 8.5–10.5)
CALCIUM SERPL-MCNC: 9.5 MG/DL (ref 8.5–10.5)
CHLORIDE SERPL-SCNC: 105 MMOL/L (ref 96–112)
CHOLEST SERPL-MCNC: 217 MG/DL (ref 100–199)
CO2 SERPL-SCNC: 26 MMOL/L (ref 20–33)
CORTIS SERPL-MCNC: 16.7 UG/DL (ref 0–23)
CREAT SERPL-MCNC: 0.89 MG/DL (ref 0.5–1.4)
CRP SERPL HS-MCNC: 0.7 MG/L (ref 0–3)
DHEA-S SERPL-MCNC: 159 UG/DL (ref 9.4–246)
EOSINOPHIL # BLD AUTO: 0.27 K/UL (ref 0–0.51)
EOSINOPHIL NFR BLD: 5.5 % (ref 0–6.9)
ERYTHROCYTE [DISTWIDTH] IN BLOOD BY AUTOMATED COUNT: 45.2 FL (ref 35.9–50)
ERYTHROCYTE [SEDIMENTATION RATE] IN BLOOD BY WESTERGREN METHOD: 5 MM/HOUR (ref 0–25)
EST. AVERAGE GLUCOSE BLD GHB EST-MCNC: 131 MG/DL
ESTRADIOL SERPL-MCNC: 30.7 PG/ML
GFR SERPLBLD CREATININE-BSD FMLA CKD-EPI: 70 ML/MIN/1.73 M 2
GLOBULIN SER CALC-MCNC: 2.6 G/DL (ref 1.9–3.5)
GLUCOSE SERPL-MCNC: 112 MG/DL (ref 65–99)
HBA1C MFR BLD: 6.2 % (ref 4–5.6)
HCT VFR BLD AUTO: 44.5 % (ref 37–47)
HCYS SERPL-SCNC: 10.46 UMOL/L
HDLC SERPL-MCNC: 62 MG/DL
HGB BLD-MCNC: 14.2 G/DL (ref 12–16)
IMM GRANULOCYTES # BLD AUTO: 0.01 K/UL (ref 0–0.11)
IMM GRANULOCYTES NFR BLD AUTO: 0.2 % (ref 0–0.9)
LDLC SERPL CALC-MCNC: 136 MG/DL
LYMPHOCYTES # BLD AUTO: 1.54 K/UL (ref 1–4.8)
LYMPHOCYTES NFR BLD: 31.6 % (ref 22–41)
MCH RBC QN AUTO: 28.5 PG (ref 27–33)
MCHC RBC AUTO-ENTMCNC: 31.9 G/DL (ref 32.2–35.5)
MCV RBC AUTO: 89.4 FL (ref 81.4–97.8)
MONOCYTES # BLD AUTO: 0.34 K/UL (ref 0–0.85)
MONOCYTES NFR BLD AUTO: 7 % (ref 0–13.4)
NEUTROPHILS # BLD AUTO: 2.65 K/UL (ref 1.82–7.42)
NEUTROPHILS NFR BLD: 54.3 % (ref 44–72)
NRBC # BLD AUTO: 0 K/UL
NRBC BLD-RTO: 0 /100 WBC (ref 0–0.2)
PLATELET # BLD AUTO: 201 K/UL (ref 164–446)
PMV BLD AUTO: 10.9 FL (ref 9–12.9)
POTASSIUM SERPL-SCNC: 4.2 MMOL/L (ref 3.6–5.5)
PROLACTIN SERPL-MCNC: 11.2 NG/ML (ref 2.8–26)
PROT SERPL-MCNC: 6.7 G/DL (ref 6–8.2)
RBC # BLD AUTO: 4.98 M/UL (ref 4.2–5.4)
SODIUM SERPL-SCNC: 142 MMOL/L (ref 135–145)
T3FREE SERPL-MCNC: 3.29 PG/ML (ref 2–4.4)
T4 FREE SERPL-MCNC: 1.2 NG/DL (ref 0.93–1.7)
T4 FREE SERPL-MCNC: 1.22 NG/DL (ref 0.93–1.7)
THYROPEROXIDASE AB SERPL-ACNC: 10 IU/ML (ref 0–9)
TRIGL SERPL-MCNC: 93 MG/DL (ref 0–149)
TSH SERPL-ACNC: 2.2 UIU/ML (ref 0.35–5.5)
VIT B12 SERPL-MCNC: 534 PG/ML (ref 211–911)
WBC # BLD AUTO: 4.9 K/UL (ref 4.8–10.8)

## 2024-08-29 PROCEDURE — 84482 T3 REVERSE: CPT

## 2024-08-29 PROCEDURE — 83525 ASSAY OF INSULIN: CPT

## 2024-08-29 PROCEDURE — 84481 FREE ASSAY (FT-3): CPT

## 2024-08-29 PROCEDURE — 36415 COLL VENOUS BLD VENIPUNCTURE: CPT

## 2024-08-29 PROCEDURE — 80053 COMPREHEN METABOLIC PANEL: CPT

## 2024-08-29 PROCEDURE — 84305 ASSAY OF SOMATOMEDIN: CPT

## 2024-08-29 PROCEDURE — 83090 ASSAY OF HOMOCYSTEINE: CPT

## 2024-08-29 PROCEDURE — 84270 ASSAY OF SEX HORMONE GLOBUL: CPT

## 2024-08-29 PROCEDURE — 84479 ASSAY OF THYROID (T3 OR T4): CPT

## 2024-08-29 PROCEDURE — 86141 C-REACTIVE PROTEIN HS: CPT

## 2024-08-29 PROCEDURE — 80061 LIPID PANEL: CPT

## 2024-08-29 PROCEDURE — 82607 VITAMIN B-12: CPT

## 2024-08-29 PROCEDURE — 83036 HEMOGLOBIN GLYCOSYLATED A1C: CPT

## 2024-08-29 PROCEDURE — 85025 COMPLETE CBC W/AUTO DIFF WBC: CPT

## 2024-08-29 PROCEDURE — 82670 ASSAY OF TOTAL ESTRADIOL: CPT

## 2024-08-29 PROCEDURE — 84146 ASSAY OF PROLACTIN: CPT

## 2024-08-29 PROCEDURE — 82627 DEHYDROEPIANDROSTERONE: CPT

## 2024-08-29 PROCEDURE — 82306 VITAMIN D 25 HYDROXY: CPT

## 2024-08-29 PROCEDURE — 85652 RBC SED RATE AUTOMATED: CPT

## 2024-08-29 PROCEDURE — 86800 THYROGLOBULIN ANTIBODY: CPT

## 2024-08-29 PROCEDURE — 82533 TOTAL CORTISOL: CPT

## 2024-08-29 PROCEDURE — 86376 MICROSOMAL ANTIBODY EACH: CPT

## 2024-08-29 PROCEDURE — 84439 ASSAY OF FREE THYROXINE: CPT | Mod: 91

## 2024-08-29 PROCEDURE — 84443 ASSAY THYROID STIM HORMONE: CPT

## 2024-08-29 PROCEDURE — 84439 ASSAY OF FREE THYROXINE: CPT

## 2024-08-31 LAB
INSULIN P FAST SERPL-ACNC: 5 UIU/ML (ref 3–25)
SHBG SERPL-SCNC: 42 NMOL/L (ref 17–125)
T UPTAKE NL11712: 1 TBI (ref 0.8–1.3)
THYROGLOB AB SERPL-ACNC: <0.9 IU/ML (ref 0–4)

## 2024-09-01 LAB
IGF-I SERPL-MCNC: 165 NG/ML (ref 28–231)
IGF-I Z-SCORE SERPL: 1

## 2024-09-04 LAB — T3REVERSE SERPL-MCNC: 13.9 NG/DL (ref 9–27)

## 2024-09-19 ENCOUNTER — OFFICE VISIT (OUTPATIENT)
Dept: MEDICAL GROUP | Facility: PHYSICIAN GROUP | Age: 69
End: 2024-09-19
Payer: MEDICARE

## 2024-09-19 VITALS
HEIGHT: 68 IN | SYSTOLIC BLOOD PRESSURE: 112 MMHG | RESPIRATION RATE: 12 BRPM | HEART RATE: 71 BPM | TEMPERATURE: 97.9 F | OXYGEN SATURATION: 98 % | WEIGHT: 140.8 LBS | DIASTOLIC BLOOD PRESSURE: 66 MMHG | BODY MASS INDEX: 21.34 KG/M2

## 2024-09-19 DIAGNOSIS — R92.333 HETEROGENEOUSLY DENSE TISSUE OF BOTH BREASTS ON MAMMOGRAPHY: ICD-10-CM

## 2024-09-19 DIAGNOSIS — I48.92 ATRIAL FLUTTER, UNSPECIFIED TYPE (HCC): ICD-10-CM

## 2024-09-19 DIAGNOSIS — F41.9 ANXIETY: ICD-10-CM

## 2024-09-19 DIAGNOSIS — J44.89 ASTHMA-COPD OVERLAP SYNDROME (HCC): ICD-10-CM

## 2024-09-19 DIAGNOSIS — E11.9 TYPE 2 DIABETES MELLITUS WITHOUT COMPLICATION, WITHOUT LONG-TERM CURRENT USE OF INSULIN (HCC): ICD-10-CM

## 2024-09-19 DIAGNOSIS — Z00.00 ENCOUNTER FOR SUBSEQUENT ANNUAL WELLNESS VISIT (AWV) IN MEDICARE PATIENT: Primary | ICD-10-CM

## 2024-09-19 DIAGNOSIS — Z12.11 COLON CANCER SCREENING: ICD-10-CM

## 2024-09-19 DIAGNOSIS — K21.9 GASTROESOPHAGEAL REFLUX DISEASE WITHOUT ESOPHAGITIS: ICD-10-CM

## 2024-09-19 DIAGNOSIS — I70.0 AORTIC ATHEROSCLEROSIS (HCC): ICD-10-CM

## 2024-09-19 DIAGNOSIS — I73.9 PAD (PERIPHERAL ARTERY DISEASE) (HCC): ICD-10-CM

## 2024-09-19 DIAGNOSIS — F13.20 BENZODIAZEPINE DEPENDENCE, EPISODIC (HCC): ICD-10-CM

## 2024-09-19 PROCEDURE — 3074F SYST BP LT 130 MM HG: CPT | Performed by: INTERNAL MEDICINE

## 2024-09-19 PROCEDURE — G0439 PPPS, SUBSEQ VISIT: HCPCS | Performed by: INTERNAL MEDICINE

## 2024-09-19 PROCEDURE — 3078F DIAST BP <80 MM HG: CPT | Performed by: INTERNAL MEDICINE

## 2024-09-19 RX ORDER — CEFDINIR 300 MG/1
300 CAPSULE ORAL 2 TIMES DAILY
COMMUNITY
Start: 2024-07-22

## 2024-09-19 RX ORDER — ALPRAZOLAM 0.5 MG
0.5 TABLET ORAL NIGHTLY PRN
COMMUNITY
End: 2024-09-19 | Stop reason: SDUPTHER

## 2024-09-19 RX ORDER — ALPRAZOLAM 0.5 MG
0.5 TABLET ORAL NIGHTLY PRN
Qty: 45 TABLET | Refills: 0 | Status: SHIPPED | OUTPATIENT
Start: 2024-09-19 | End: 2024-12-18

## 2024-09-19 ASSESSMENT — FIBROSIS 4 INDEX: FIB4 SCORE: 2.44

## 2024-09-19 ASSESSMENT — PATIENT HEALTH QUESTIONNAIRE - PHQ9: CLINICAL INTERPRETATION OF PHQ2 SCORE: 0

## 2024-09-19 ASSESSMENT — ACTIVITIES OF DAILY LIVING (ADL): BATHING_REQUIRES_ASSISTANCE: 0

## 2024-09-19 ASSESSMENT — ENCOUNTER SYMPTOMS: GENERAL WELL-BEING: EXCELLENT

## 2024-09-19 NOTE — PROGRESS NOTES
Chief Complaint   Patient presents with    Annual Exam     Annual   LOV 9/18/23  Labs 8/29/24       HPI:  Anaya Browne is a 69 y.o. here for Medicare Annual Wellness Visit     History of Present Illness  The patient presents for evaluation of multiple medical concerns.    She experiences a cough, particularly after meals, leading her to suspect GERD as a contributor. Despite a normal lower endoscopy in the past, she was advised to undergo an upper endoscopy but this was never scheduled. Her cough is severe enough to cause choking and is accompanied by nasal drainage. She takes famotidine daily. The cough does not interfere with her daily activities but disturbs her  at night. Elevating her bed seems to alleviate the nighttime coughing. She has tried albuterol without success.    She finds metformin effective and exercises at the gym three times a week. However, she struggles with weight loss and is surprised to weigh 140 pounds. She is curious about other diabetes medications and maintains regular bowel movements. She recalls her A1c being 5.9 when she was first started on metformin. She is considering increasing her gym efforts instead of trying new medications.    She has undergone a hysterectomy and is under the care of a gynecologist who prescribes estradiol. She last saw her gynecologist in May 2024 and plans to transition her care to this me. She requests a prescription for estradiol before her next visit.    She has had a mammogram and expresses concern about her breast health due to her mother's history of breast cancer. She is interested in additional screening methods such as breast ultrasound. She has not had any breast biopsies or MRIs.    She has been experiencing left > right hand tremors and consulted a neurologist four years ago. The tremors are progressing, affecting her ability to hold objects like a fork. She is unsure if this is a normal part of aging or if it requires further  investigation. She does not consume alcohol and notes that her handwriting has deteriorated.    Her cholesterol levels have decreased, which she attributes to increased exercise and dietary changes. She follows a diet rich in meat and protein. She is questioning the necessity of annual bone density tests and whether she is at risk for not taking cholesterol medication.    She is due for a colonoscopy and has breast implants.    She has had an ablation procedure for heart rhythm. She has hemorrhoids. She has sleep apnea. She has anxiety and takes Xanax.    FAMILY HISTORY  Her mother had breast cancer. Her sister had several cysts.        Patient Active Problem List    Diagnosis Date Noted    Type 2 diabetes mellitus without complication, without long-term current use of insulin ([facility]) 09/19/2024    Aortic atherosclerosis ([facility]) 04/06/2023    Osteopenia of multiple sites 04/06/2023    Personal history of atrial flutter 04/06/2023    Prediabetes 04/06/2023    Benzodiazepine dependence, episodic ([facility]) 04/06/2023    Anxiety 04/06/2023    Primary insomnia 03/29/2023    Hyperglycemia 03/03/2021    Benign essential tremor 09/09/2020    Asthma-COPD overlap syndrome ([facility]) 02/26/2020    Gastroesophageal reflux disease without esophagitis 02/26/2020    Mixed hyperlipidemia 08/05/2019    Atrial flutter ([facility]) 01/11/2013       Current Outpatient Medications   Medication Sig Dispense Refill    cefdinir (OMNICEF) 300 MG Cap Take 300 mg by mouth 2 times a day.      ALPRAZolam (XANAX) 0.5 MG Tab Take 1 Tablet by mouth at bedtime as needed for Anxiety for up to 90 days. Patient takes 1/2 a tab as needed 45 Tablet 0    famotidine (PEPCID) 20 MG Tab Take 20 mg by mouth every day. Indications: Gastroesophageal Reflux Disease      vitamin D3 (CHOLECALCIFEROL) 1000 Unit (25 mcg) Tab Take 2,000 Units by mouth every day.      estradiol (ESTRACE) 0.1 MG/GM vaginal cream Insert 1 g into the vagina see administration instructions. Pt takes twice a  week, no set days      magnesium oxide (MAG-OX) 400 MG Tab Take 400 mg by mouth every day.      metformin (GLUCOPHAGE) 500 MG Tab Take 500 mg by mouth 2 times a day, with meals.       No current facility-administered medications for this visit.          Current supplements as per medication list.     Allergies: Patient has no known allergies.    Current social contact/activities:      She  reports that she quit smoking about 12 years ago. Her smoking use included cigarettes. She started smoking about 42 years ago. She has a 15 pack-year smoking history. She has never used smokeless tobacco. She reports current alcohol use of about 1.5 oz of alcohol per week. She reports that she does not currently use drugs after having used the following drugs: Marijuana and Oral. Frequency: 2.00 times per week.  Counseling given: Not Answered      ROS:    Gait: Uses no assistive device  Ostomy: No  Other tubes: No  Amputations: No  Chronic oxygen use: No  Last eye exam: June 2024   Wears hearing aids: Yes   : Denies any urinary leakage during the last 6 months    Screening:    Depression Screening  Little interest or pleasure in doing things?  0 - not at all  Feeling down, depressed , or hopeless? 0 - not at all  Trouble falling or staying asleep, or sleeping too much?     Feeling tired or having little energy?     Poor appetite or overeating?     Feeling bad about yourself - or that you are a failure or have let yourself or your family down?    Trouble concentrating on things, such as reading the newspaper or watching television?    Moving or speaking so slowly that other people could have noticed.  Or the opposite - being so fidgety or restless that you have been moving around a lot more than usual?     Thoughts that you would be better off dead, or of hurting yourself?     Patient Health Questionnaire Score:      If depressive symptoms identified deferred to follow up visit unless specifically addressed in assessment and  plan.    Interpretation of PHQ-9 Total Score   Score Severity   1-4 No Depression   5-9 Mild Depression   10-14 Moderate Depression   15-19 Moderately Severe Depression   20-27 Severe Depression    Screening for Cognitive Impairment  Do you or any of your friends or family members have any concern about your memory?    Three Minute Recall (Leader, Season, Table) 3/3    Flavio clock face with all 12 numbers and set the hands to show 10 minutes after 11.  Yes    Cognitive concerns identified deferred for follow up unless specifically addressed in assessment and plan.    Fall Risk Assessment  Has the patient had two or more falls in the last year or any fall with injury in the last year?  No    Safety Assessment  Do you always wear your seatbelt?  Yes  Any changes to home needed to function safely? No  Difficulty hearing.  Yes  Patient counseled about all safety risks that were identified.    Functional Assessment ADLs  Are there any barriers preventing you from cooking for yourself or meeting nutritional needs?  No.    Are there any barriers preventing you from driving safely or obtaining transportation?  No.    Are there any barriers preventing you from using a telephone or calling for help?  No    Are there any barriers preventing you from shopping?  No.    Are there any barriers preventing you from taking care of your own finances?  No    Are there any barriers preventing you from managing your medications?  No    Are there any barriers preventing you from showering, bathing or dressing yourself? No    Are there any barriers preventing you from doing housework or laundry? No    Are there any barriers preventing you from using the toilet?No    Are you currently engaging in any exercise or physical activity?  Yes. Yoga twice a week and gym 3 times a week    Self-Assessment of Health  What is your perception of your health? Excellent    Do you sleep more than six hours a night? Yes    In the past 7 days, how much did  pain keep you from doing your normal work? None    Do you spend quality time with family or friends (virtually or in person)? Yes    Do you usually eat a heart healthy diet that constists of a variety of fruits, vegetables, whole grains and fiber? Yes    Do you eat foods high in fat and/or Fast Food more than three times per week? No    How concerned are you that your medical conditions are not being well managed? Not at all    Are you worried that in the next 2 months, you may not have stable housing that you own, rent, or stay in as part of a household? No        Advance Care Planning  Do you have an Advance Directive, Living Will, Durable Power of , or POLST? Yes  Advance Directive       is on file      Health Maintenance Summary            Overdue - IMM DTaP/Tdap/Td Vaccine (1 - Tdap) Never done      No completion history exists for this topic.              Overdue - Pneumococcal Vaccine: 65+ Years (2 of 2 - PCV) Overdue since 1/11/2023 01/11/2022  Imm Admin: Pneumococcal polysaccharide vaccine (PPSV-23)              Overdue - Influenza Vaccine (1) Overdue since 9/1/2024 09/18/2023  Imm Admin: Influenza Vaccine Adult HD    10/15/2022  Imm Admin: Influenza, Unspecified - HISTORICAL DATA    10/04/2021  Imm Admin: Influenza, Unspecified - HISTORICAL DATA    09/15/2020  Imm Admin: Influenza Vaccine Quad Inj (Pf)    09/24/2019  Imm Admin: Influenza Vaccine Quad Inj (Pf)    Only the first 5 history entries have been loaded, but more history exists.              Overdue - COVID-19 Vaccine (4 - 2023-24 season) Overdue since 9/1/2024      10/04/2021  Imm Admin: PFIZER PURPLE CAP SARS-COV-2 VACCINATION (12+)    04/02/2021  Imm Admin: PFIZER PURPLE CAP SARS-COV-2 VACCINATION (12+)    03/11/2021  Imm Admin: PFIZER PURPLE CAP SARS-COV-2 VACCINATION (12+)              Ordered - Colorectal Cancer Screening (Colonoscopy - Every 10 Years) Ordered on 9/19/2024      10/02/2014  REFERRAL TO GI FOR COLONOSCOPY               Bone Density Scan (Every 2 Years) Next due on 2023  DS-BONE DENSITY STUDY (DEXA)    2020  DS-BONE DENSITY STUDY (DEXA)    2004  DS-BONE DENSITY STUDY (DEXA)    2004  DS-BONE DENSITY STUDY (DEXA)              Mammogram (Yearly) Next due on 2024  MA-SCREENING MAMMO BILAT W/IMPLANTS W/FIOR W/CAD    2023  MA-SCREENING MAMMO BILAT W/IMPLANTS W/FIOR W/CAD    2022  MA-SCREENING MAMMO BILAT W/IMPLANTS W/FIOR W/CAD    2021  MA-SCREENING MAMMO BILAT W/IMPLANTS W/FIOR W/CAD    2020  MA-SCREENING MAMMO BILAT W/IMPLANTS W/FIOR W/CAD    Only the first 5 history entries have been loaded, but more history exists.              Annual Wellness Visit (Yearly) Next due on 2024  Level of Service: MO ANNUAL WELLNESS VISIT-INCLUDES PPPS SUBSEQUE*    2023  Level of Service: MO ANNUAL WELLNESS VISIT-INCLUDES PPPS SUBSEQUE*              Hepatitis C Screening  Tentatively Complete      2021  Hepatitis C Antibody component of HCV Scrn ( 4030-4619 1xLife)              Zoster (Shingles) Vaccines (Series Information) Completed      2022  Imm Admin: Zoster Vaccine Recombinant (RZV) (SHINGRIX)    10/15/2021  Imm Admin: Zoster Vaccine Recombinant (RZV) (SHINGRIX)              Hepatitis A Vaccine (Hep A) (Series Information) Aged Out      No completion history exists for this topic.              Hepatitis B Vaccine (Hep B) (Series Information) Aged Out      No completion history exists for this topic.              HPV Vaccines (Series Information) Aged Out      No completion history exists for this topic.              Polio Vaccine (Inactivated Polio) (Series Information) Aged Out      No completion history exists for this topic.              Meningococcal Immunization (Series Information) Aged Out      No completion history exists for this topic.              Discontinued - A1c Screening  Discontinued         Frequency changed to Never automatically (Topic No Longer Applies)    08/29/2024  HEMOGLOBIN A1C    08/18/2023  HEMOGLOBIN A1C    06/06/2022  HEMOGLOBIN A1C    12/17/2021  POCT Hemoglobin A1C    Only the first 5 history entries have been loaded, but more history exists.              Discontinued - Fasting Lipid Profile  Discontinued        Frequency changed to Never automatically (Topic No Longer Applies)    08/29/2024  Lipid Profile    08/18/2023  Lipid Profile    08/18/2023  LipoFit by NMR    06/06/2022  Lipid Profile    Only the first 5 history entries have been loaded, but more history exists.              Discontinued - Diabetes: Urine Protein Screening  Discontinued        Frequency changed to Never automatically (Topic No Longer Applies)    06/06/2022  MICROALBUMIN CREAT RATIO URINE    06/24/2021  MICROALBUMIN CREAT RATIO URINE              Discontinued - SERUM CREATININE  Discontinued        Frequency changed to Never automatically (Topic No Longer Applies)    08/29/2024  Comp Metabolic Panel    08/18/2023  Comp Metabolic Panel    06/06/2022  Comp Metabolic Panel    10/28/2021  Comp Metabolic Panel    Only the first 5 history entries have been loaded, but more history exists.              Discontinued - Cervical Cancer Screening  Discontinued        Frequency changed to Never automatically (Topic No Longer Applies)    03/03/2020  PAP Only              Discontinued - Diabetes: Monofilament / LE Exam  Discontinued        Frequency changed to Never automatically (Topic No Longer Applies)    12/17/2021  Diabetic Monofilament LE Exam    12/17/2021  SmartData: WORKFLOW - DIABETES - DIABETIC FOOT EXAM PERFORMED              Discontinued - Annual Pulmonary Function Test / Spirometry  Discontinued      02/02/2022  PULMONARY FUNCTION TESTS -Test requested: Complete Pulmonary Function Test    12/21/2020  PULMONARY FUNCTION TESTS -Test requested: Complete Pulmonary Function Test    02/26/2020  PULMONARY FUNCTION TESTS  "-Test requested: Complete Pulmonary Function Test    2012  PFT DICTATED RESULTS                    Patient Care Team:  Alyssa Rodriguez D.O. as PCP - General (Internal Medicine)  Carla Abrams M.D. as PCP - [facility] Paneled      Social History     Tobacco Use    Smoking status: Former     Current packs/day: 0.00     Average packs/day: 0.5 packs/day for 30.0 years (15.0 ttl pk-yrs)     Types: Cigarettes     Start date: 1982     Quit date: 2012     Years since quittin.6    Smokeless tobacco: Never   Vaping Use    Vaping status: Never Used   Substance Use Topics    Alcohol use: Yes     Alcohol/week: 1.5 oz     Types: 3 Glasses of wine per week    Drug use: Not Currently     Frequency: 2.0 times per week     Types: Marijuana, Oral     Comment: twice a week     Family History   Problem Relation Age of Onset    Breast Cancer Mother         breast cancer 68    Cancer Mother     Lung Cancer Father     Dementia Brother         vascular    Stroke Brother     Cancer Maternal Aunt         breast cancer     She  has a past medical history of Atrial fibrillation (HCC), Atrial flutter (HCC), Back pain, Chickenpox, Chronic cough (2020), Chronic sinusitis (2020), Cough, Daytime sleepiness, Depression, Diabetes (HCC) (2021), Gastroesophageal reflux disease (2020), GERD (gastroesophageal reflux disease), Hoarseness, persistent, Influenza, Mumps, Shortness of breath, Skin change, Sputum production, Sweat, sweating, excessive, and Wears glasses.   Past Surgical History:   Procedure Laterality Date    HYSTERECTOMY LAPAROSCOPY      HYSTERECTOMY LAPAROSCOPY      MAMMOPLASTY AUGMENTATION Bilateral     OTHER      EGD with Bravo Capsule Placed 2021.       Exam:   /66 (BP Location: Left arm, Patient Position: Sitting, BP Cuff Size: Adult)   Pulse 71   Temp 36.6 °C (97.9 °F) (Temporal)   Resp 12   Ht 1.715 m (5' 7.5\")   Wt 63.9 kg (140 lb 12.8 oz)   SpO2 98%  Body mass index is " 21.73 kg/m².    Hearing fair.    Dentition good  Alert, oriented in no acute distress.  Eye contact is good, speech goal directed, affect calm      Assessment & Plan  1. Cough, likely GERD.  The patient's cough could be attributed to various factors including acid imbalance, scar tissue formation, improper sphincter relaxation, hiatal hernia, postnasal drip, esophageal spasm, or aspiration. Chronic cough could also be due to postnasal drip triggering a nerve reflex in the throat, GERD, or cough variant asthma. She experiences coughing almost immediately after eating and at night when lying flat. She has tried albuterol inhaler and stomach acid medicine in the past without improvement. A referral will be made for a discussion regarding endoscopy and scheduling of a colonoscopy.    2. Diabetes Mellitus type 2, well controlled.  She qualifies for Rybelsus, a semaglutide pill, which can aid in weight loss but after discussing the risks, benefits, and alternatives she declines. Without metformin, her A1c would likely be around 7. She will continue her current regimen of metformin 500 mg twice daily.    3. Post-hysterectomy status.  A prescription for estradiol will be provided. She will follow up in 6 months for prescription management.    4. Breast cancer screening.  A 3D breast ultrasound will be ordered for 12/2024. She will have a mammogram and bone density test in June 2025.    5. Essential tremor.  The bilateral nature of the tremors suggests it is not Parkinson's disease. Neurological examination showed no features of Parkinson's disease. Pharmacologic options include primidone, propranolol, and gabapentin.    6. Hyperlipidemia  7. Health maintenance  Her LDL has decreased by 30 points over the past year. She is not due for a bone density test until 2025. Her cardiac score from a few years ago showed minimal plaque.     8. Generalized anxiety  9. Episodic dependence of benzodiazepine  A prescription for Xanax will  "be provided, she uses this sparingly and has no noted side effects upon further discussion.   Obtained and reviewed patient utilization report from [facility] pharmacy database on 9/19/2024 2:00 PM  prior to writing prescription for controlled substance II, III or IV per [place] bill . Based on assessment of the report, the prescription is medically necessary.     Patient understands this prescription is a controlled substance which is potentially habit-forming and its use is regulated by the [place]. We also discussed the new \"black box\" warning regarding the lethal combination of opioids and benzodiazepines. Refills are subject to terms of a controlled substance agreement and patient has an updated one on file. Most recent UDS is appropriate. Any refill requires an office visit. Narcotics have may adverse effects and the risks of addiction, accidental overdose and death were emphasized. Provided prescriptions for the next three months.            Assessment and Plan. The following treatment and monitoring plan is recommended:   Problem List Items Addressed This Visit       Anxiety    Relevant Medications    ALPRAZolam (XANAX) 0.5 MG Tab    Aortic atherosclerosis (HCC)     Chronic stable problem, continue with healthy lifestyle approach, reassuring CT cardiac score in the past, no angina or anginal equivalents.         Asthma-COPD overlap syndrome (HCC)     Previous issue, since ablation has not noted any breathing issues but does deal with a fairly chronic cough, discussed this could be a form of cough variant asthma, however, as it worsens after eating and while she is supine we will start with considering EGD.         Atrial flutter (HCC)     Previous problem, s/p ablation in 11/2021 and doing well, no further anticoagulation indicated per cardiology unless she would have a recurrence.         Benzodiazepine dependence, episodic (HCC)    Gastroesophageal reflux disease without esophagitis    Relevant Orders    " Referral to Gastroenterology    RESOLVED: PAD (peripheral artery disease) (HCC)    Type 2 diabetes mellitus without complication, without long-term current use of insulin (HCC)     Other Visit Diagnoses       Encounter for subsequent annual wellness visit (AWV) in Medicare patient    -  Primary    Colon cancer screening        Relevant Orders    Referral to GI for Colonoscopy    Heterogeneously dense tissue of both breasts on mammography        Relevant Orders    US-BREAST BILAT-COMPLETE                Services suggested: No services needed at this time  Health Care Screening: Age-appropriate preventive services recommended by USPTF and ACIP covered by Medicare were discussed today. Services ordered if indicated and agreed upon by the patient.  Referrals offered: Community-based lifestyle interventions to reduce health risks and promote self-management and wellness, fall prevention, nutrition, physical activity, tobacco-use cessation, weight loss, and mental health services as per orders if indicated.    Discussion today about general wellness and lifestyle habits:    Prevent falls and reduce trip hazards; Cautioned about securing or removing rugs.  Have a working fire alarm and carbon monoxide detector;   Engage in regular physical activity and social activities     Follow-up: Return in about 6 months (around 3/19/2025).    I spent a total of 32 minutes with record review, exam, communication with the patient, communication with other providers, and documentation of this encounter.    Alyssa Rodriguez, DO  Geriatric and Internal Medicine  [facility]

## 2024-09-20 PROBLEM — I73.9 PAD (PERIPHERAL ARTERY DISEASE) (HCC): Status: RESOLVED | Noted: 2023-04-06 | Resolved: 2024-09-20

## 2024-09-20 NOTE — ASSESSMENT & PLAN NOTE
Previous problem, s/p ablation in 11/2021 and doing well, no further anticoagulation indicated per cardiology unless she would have a recurrence.

## 2024-09-20 NOTE — ASSESSMENT & PLAN NOTE
Previous issue, since ablation has not noted any breathing issues but does deal with a fairly chronic cough, discussed this could be a form of cough variant asthma, however, as it worsens after eating and while she is supine we will start with considering EGD.

## 2024-09-20 NOTE — ASSESSMENT & PLAN NOTE
Chronic stable problem, continue with healthy lifestyle approach, reassuring CT cardiac score in the past, no angina or anginal equivalents.

## 2024-11-13 ENCOUNTER — APPOINTMENT (RX ONLY)
Dept: URBAN - METROPOLITAN AREA CLINIC 6 | Facility: CLINIC | Age: 69
Setting detail: DERMATOLOGY
End: 2024-11-13

## 2024-11-13 DIAGNOSIS — D22 MELANOCYTIC NEVI: ICD-10-CM

## 2024-11-13 DIAGNOSIS — Z71.89 OTHER SPECIFIED COUNSELING: ICD-10-CM

## 2024-11-13 DIAGNOSIS — D485 NEOPLASM OF UNCERTAIN BEHAVIOR OF SKIN: ICD-10-CM

## 2024-11-13 PROBLEM — D22.39 MELANOCYTIC NEVI OF OTHER PARTS OF FACE: Status: ACTIVE | Noted: 2024-11-13

## 2024-11-13 PROBLEM — D48.5 NEOPLASM OF UNCERTAIN BEHAVIOR OF SKIN: Status: ACTIVE | Noted: 2024-11-13

## 2024-11-13 PROCEDURE — 99212 OFFICE O/P EST SF 10 MIN: CPT | Mod: 25

## 2024-11-13 PROCEDURE — 11102 TANGNTL BX SKIN SINGLE LES: CPT

## 2024-11-13 PROCEDURE — ? COUNSELING

## 2024-11-13 PROCEDURE — ? BIOPSY BY SHAVE METHOD

## 2024-11-13 PROCEDURE — ? PHOTO-DOCUMENTATION

## 2024-11-13 PROCEDURE — ? DIAGNOSIS COMMENT

## 2024-11-13 ASSESSMENT — LOCATION ZONE DERM
LOCATION ZONE: NOSE
LOCATION ZONE: FACE

## 2024-11-13 ASSESSMENT — LOCATION SIMPLE DESCRIPTION DERM
LOCATION SIMPLE: GLABELLA
LOCATION SIMPLE: RIGHT NOSE
LOCATION SIMPLE: RIGHT EYEBROW

## 2024-11-13 ASSESSMENT — LOCATION DETAILED DESCRIPTION DERM
LOCATION DETAILED: RIGHT CENTRAL EYEBROW
LOCATION DETAILED: GLABELLA
LOCATION DETAILED: RIGHT NASAL SIDEWALL

## 2024-11-13 NOTE — PROCEDURE: PHOTO-DOCUMENTATION
-lip moisturizers  -chlorhexidine wash bid
Detail Level: Zone
Photo Preface (Leave Blank If You Do Not Want): Photographs were obtained today

## 2024-11-13 NOTE — PROCEDURE: DIAGNOSIS COMMENT
Comment: Previously removed several years ago. Pt reports she had a stitch for each. States path was benign nevi. She would like removal or perhaps filler and or Botox to the area/ around moles to help smooth lines and add more volume. \\nTask sent to Dr. Baptiste for consultation
Render Risk Assessment In Note?: no
Detail Level: Simple

## 2024-12-11 DIAGNOSIS — Z12.31 ENCOUNTER FOR SCREENING MAMMOGRAM FOR MALIGNANT NEOPLASM OF BREAST: ICD-10-CM

## 2025-02-10 ENCOUNTER — HOSPITAL ENCOUNTER (OUTPATIENT)
Dept: RADIOLOGY | Facility: MEDICAL CENTER | Age: 70
End: 2025-02-10
Attending: INTERNAL MEDICINE
Payer: MEDICARE

## 2025-02-10 DIAGNOSIS — R92.333 HETEROGENEOUSLY DENSE TISSUE OF BOTH BREASTS ON MAMMOGRAPHY: ICD-10-CM

## 2025-02-10 PROCEDURE — 76641 ULTRASOUND BREAST COMPLETE: CPT

## 2025-02-17 ENCOUNTER — RESULTS FOLLOW-UP (OUTPATIENT)
Dept: MEDICAL GROUP | Facility: PHYSICIAN GROUP | Age: 70
End: 2025-02-17

## 2025-03-18 ENCOUNTER — OFFICE VISIT (OUTPATIENT)
Dept: MEDICAL GROUP | Facility: PHYSICIAN GROUP | Age: 70
End: 2025-03-18
Payer: MEDICARE

## 2025-03-18 VITALS
WEIGHT: 140.2 LBS | HEIGHT: 68 IN | DIASTOLIC BLOOD PRESSURE: 58 MMHG | SYSTOLIC BLOOD PRESSURE: 102 MMHG | TEMPERATURE: 96.7 F | BODY MASS INDEX: 21.25 KG/M2 | RESPIRATION RATE: 16 BRPM | HEART RATE: 73 BPM | OXYGEN SATURATION: 99 %

## 2025-03-18 DIAGNOSIS — J44.89 ASTHMA-COPD OVERLAP SYNDROME (HCC): ICD-10-CM

## 2025-03-18 DIAGNOSIS — F41.9 ANXIETY: ICD-10-CM

## 2025-03-18 DIAGNOSIS — Z23 NEED FOR PNEUMOCOCCAL VACCINATION: ICD-10-CM

## 2025-03-18 DIAGNOSIS — Z23 NEED FOR DIPHTHERIA-TETANUS-PERTUSSIS (TDAP) VACCINE: ICD-10-CM

## 2025-03-18 DIAGNOSIS — F13.20 BENZODIAZEPINE DEPENDENCE, EPISODIC (HCC): ICD-10-CM

## 2025-03-18 DIAGNOSIS — M19.042 LOCALIZED PRIMARY OSTEOARTHRITIS OF HANDS, BILATERAL: ICD-10-CM

## 2025-03-18 DIAGNOSIS — E78.5 DYSLIPIDEMIA: ICD-10-CM

## 2025-03-18 DIAGNOSIS — M19.041 LOCALIZED PRIMARY OSTEOARTHRITIS OF HANDS, BILATERAL: ICD-10-CM

## 2025-03-18 DIAGNOSIS — I48.92 ATRIAL FLUTTER, UNSPECIFIED TYPE (HCC): ICD-10-CM

## 2025-03-18 DIAGNOSIS — Z12.31 ENCOUNTER FOR SCREENING MAMMOGRAM FOR MALIGNANT NEOPLASM OF BREAST: ICD-10-CM

## 2025-03-18 DIAGNOSIS — N95.8 GENITOURINARY SYNDROME OF MENOPAUSE: ICD-10-CM

## 2025-03-18 DIAGNOSIS — M85.89 OSTEOPENIA OF MULTIPLE SITES: ICD-10-CM

## 2025-03-18 DIAGNOSIS — R73.03 PREDIABETES: ICD-10-CM

## 2025-03-18 PROCEDURE — G0009 ADMIN PNEUMOCOCCAL VACCINE: HCPCS | Performed by: INTERNAL MEDICINE

## 2025-03-18 PROCEDURE — 3078F DIAST BP <80 MM HG: CPT | Performed by: INTERNAL MEDICINE

## 2025-03-18 PROCEDURE — 90715 TDAP VACCINE 7 YRS/> IM: CPT | Performed by: INTERNAL MEDICINE

## 2025-03-18 PROCEDURE — 90472 IMMUNIZATION ADMIN EACH ADD: CPT | Performed by: INTERNAL MEDICINE

## 2025-03-18 PROCEDURE — G2211 COMPLEX E/M VISIT ADD ON: HCPCS | Mod: 25 | Performed by: INTERNAL MEDICINE

## 2025-03-18 PROCEDURE — 90677 PCV20 VACCINE IM: CPT | Performed by: INTERNAL MEDICINE

## 2025-03-18 PROCEDURE — 3074F SYST BP LT 130 MM HG: CPT | Performed by: INTERNAL MEDICINE

## 2025-03-18 PROCEDURE — 99214 OFFICE O/P EST MOD 30 MIN: CPT | Mod: 25 | Performed by: INTERNAL MEDICINE

## 2025-03-18 RX ORDER — ALPRAZOLAM 0.5 MG
0.5 TABLET ORAL
Qty: 45 TABLET | Refills: 1 | Status: SHIPPED | OUTPATIENT
Start: 2025-03-18 | End: 2025-06-16

## 2025-03-18 RX ORDER — ESTRADIOL 0.1 MG/G
1 CREAM VAGINAL
Qty: 42.5 G | Refills: 3 | Status: SHIPPED | OUTPATIENT
Start: 2025-03-18

## 2025-03-18 ASSESSMENT — FIBROSIS 4 INDEX: FIB4 SCORE: 2.48

## 2025-03-18 ASSESSMENT — PATIENT HEALTH QUESTIONNAIRE - PHQ9: CLINICAL INTERPRETATION OF PHQ2 SCORE: 0

## 2025-03-18 NOTE — PROGRESS NOTES
Subjective:   Chief Complaint/History of Present Illness:  Anaya Browne is a 70 y.o. female established patient who presents today to discuss medical problems as listed below. Anaya is unaccompanied for today's visit.    History of Present Illness  The patient presents for evaluation of arthritis, essential tremors, ankle swelling, and health maintenance.    She reports experiencing mild arthritis at the base of her thumbs, which does not interfere with her gym activities. She is seeking advice on the regular use of Tylenol for this condition. She also mentions that her essential tremors have been progressively worsening.    She has noticed swelling in her ankles following recent flights, with the right ankle swelling subsiding more quickly than the left. She is considering the use of compression socks during flights. Despite the swelling, she continues to attend the gym and perform balance exercises without any issues. She has attempted to alleviate the swelling by elevating her feet for 8 hours, but this has not resulted in any noticeable change.    She has decided to discontinue her visits to the gynecologist and is requesting a prescription for estradiol 0.1 mg cream, which she applies twice weekly. A single tube typically lasts her approximately 3 months.    She is requesting a refill of her Xanax prescription, which she uses sparingly, particularly during travel due to associated anxiety. She estimates that she has approximately 20 pills remaining.    She is requesting a blood work order to be completed prior to her next visit. She has not received a tetanus vaccine in several years and is interested in receiving one today. She undergoes annual mammograms and has recently had a bilateral breast ultrasound. She has not had a bone density scan in the past 4 years. She reports no changes in her vision, although she has been informed of the early stages of cataracts. She maintains regular bowel  "movements. She is under the care of a dermatologist for skin checks and has a recurring issue with a lesion on her nose. She has not identified any suspicious moles on her body.    MEDICATIONS  Current: Tylenol, estradiol cream, Xanax    IMMUNIZATIONS  She received the Pneumovax vaccine in January 2022.       Current Medications:  Current Outpatient Medications Ordered in Epic   Medication Sig Dispense Refill    estradiol (ESTRACE) 0.1 MG/GM vaginal cream Insert 1 g into the vagina every 72 hours. Pt takes twice a week, no set days 42.5 g 3    ALPRAZolam (XANAX) 0.5 MG Tab Take 1 Tablet by mouth 1 time a day as needed for Anxiety (flight phobia, worsened anxiety) for up to 90 days. 45 Tablet 1    famotidine (PEPCID) 20 MG Tab Take 20 mg by mouth every day. Indications: Gastroesophageal Reflux Disease      vitamin D3 (CHOLECALCIFEROL) 1000 Unit (25 mcg) Tab Take 2,000 Units by mouth every day.      magnesium oxide (MAG-OX) 400 MG Tab Take 400 mg by mouth every day.      metformin (GLUCOPHAGE) 500 MG Tab Take 500 mg by mouth 2 times a day, with meals.       No current Epic-ordered facility-administered medications on file.          Objective:   Physical Exam:    Vitals: /58 (BP Location: Left arm, Patient Position: Sitting, BP Cuff Size: Adult)   Pulse 73   Temp 35.9 °C (96.7 °F) (Temporal)   Resp 16   Ht 1.715 m (5' 7.5\")   Wt 63.6 kg (140 lb 3.2 oz)   SpO2 99%    BMI: Body mass index is 21.63 kg/m².  Physical Exam  Constitutional:       General: She is not in acute distress.     Appearance: Normal appearance. She is not ill-appearing.   HENT:      Right Ear: Ear canal and external ear normal. There is no impacted cerumen.      Left Ear: Ear canal and external ear normal. There is no impacted cerumen.      Ears:      Comments: Hearing aids in place  Eyes:      General: No scleral icterus.     Conjunctiva/sclera: Conjunctivae normal.   Cardiovascular:      Rate and Rhythm: Normal rate and regular rhythm. "      Pulses: Normal pulses.   Pulmonary:      Effort: Pulmonary effort is normal. No respiratory distress.      Breath sounds: No wheezing or rhonchi.   Abdominal:      General: Bowel sounds are normal. There is no distension.      Palpations: Abdomen is soft.   Musculoskeletal:      Right lower leg: No edema.      Left lower leg: No edema.   Lymphadenopathy:      Cervical: No cervical adenopathy.   Skin:     General: Skin is warm and dry.      Findings: No rash.   Neurological:      Gait: Gait normal.   Psychiatric:         Mood and Affect: Mood normal.         Behavior: Behavior normal.         Thought Content: Thought content normal.         Judgment: Judgment normal.          Assessment & Plan  1. Arthritis bilateral hands, worst at [facility].  The patient's symptoms are consistent with primary hand osteoarthritis, characterized by pain at the base of the thumbs that extends downwards. She was advised to continue her current regimen of Tylenol. Additionally, the use of topical Voltaren or diclofenac was recommended for its anti-inflammatory properties, which should not adversely affect her stomach or kidneys.     2. Ankle swelling, left > right, with air travel.  The patient's symptoms suggest a mild case of venous insufficiency, which is not uncommon. She was advised to wear compression socks during flights and to elevate her feet while sleeping. The use of diuretics was discussed, but given the absence of pitting edema and skin stretching, it was deemed unnecessary at this time.    3. Genitourinary syndrome of menopause   A prescription for estradiol 0.1 mg cream was provided, to be used twice weekly. The prescription will be sent to [facility].    4. Episodic benzodiazepine dependence  5. Anxiety   A prescription for Xanax 0.5 mg was provided, to be used as needed. The prescription will be sent to [facility] pharmacy. A controlled substance agreement was signed. Does not use it every day and does not take a full dose.  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 No noted side effects. Do not use with alcohol or opiate medications.    Obtained and reviewed patient utilization report from [facility] pharmacy database on 3/18/2025 12:37 PM  prior to writing prescription for controlled substance II, III or IV per [place] bill . Based on assessment of the report, the prescription is medically necessary.     Patient understands this prescription is a controlled substance which is potentially habit-forming and its use is regulated by the [facility]. We also discussed the new \"black box\" warning regarding the lethal combination of opioids and benzodiazepines. Refills are subject to terms of a controlled substance agreement and patient has an updated one on file. Most recent UDS is appropriate. Any refill requires an office visit. Narcotics have may adverse effects and the risks of addiction, accidental overdose and death were emphasized. Provided prescriptions for the next three months.    6. Osteopenia lumbar spine  7. Mammogram screening  8. Vaccine discussion- prevnar 20 and Tdap  Orders for blood work, a mammogram, and a bone density scan were placed. She received the Prevnar 20 and Tdap vaccines today. A 3D breast ultrasound was ordered for her follow-up appointment in September 2025, to be completed by February 2026.    9. Asthma-COPD overlap  Chronic and stable, saw Dr. Cruz in the past, no formal treatment needed at this time, okay to observe without pharmacotherapy.     10. Aflutter s/p ablation in 2021  Previous issue, s/p ablation with no signs or symptoms of recurrence, no medication needed at this time.    11. Prediabetes  12. Dyslipidemia  Update blood work before follow up, no prior A1c readings above 6.5. continue on current treatment.    Follow-up  The patient is scheduled for a follow-up visit in September 2025.      Assessment and Plan:   Anaya is a 70 y.o. female with the " following:  Problem List Items Addressed This Visit       Anxiety    Relevant Medications    ALPRAZolam (XANAX) 0.5 MG Tab    Asthma-COPD overlap syndrome (HCC)    Atrial flutter (HCC)    Benzodiazepine dependence, episodic (HCC)    Relevant Medications    ALPRAZolam (XANAX) 0.5 MG Tab    Other Relevant Orders    Controlled Substance Treatment Agreement    Dyslipidemia    Relevant Orders    FREE THYROXINE    TSH    VITAMIN B12    VITAMIN D,25 HYDROXY (DEFICIENCY)    Lipid Profile    Comp Metabolic Panel    CBC WITH DIFFERENTIAL    Genitourinary syndrome of menopause    Relevant Medications    estradiol (ESTRACE) 0.1 MG/GM vaginal cream    Localized primary osteoarthritis of hands, bilateral    Osteopenia of multiple sites    Relevant Orders    DS-BONE DENSITY STUDY (DEXA)    Type 2 diabetes mellitus without complication, without long-term current use of insulin (HCC)     Other Visit Diagnoses         Need for pneumococcal vaccination        Relevant Orders    Pneumococcal Conjugate Vaccine 20-Valent (6 wks+) (Completed)      Need for diphtheria-tetanus-pertussis (Tdap) vaccine        Relevant Orders    Tdap =>6yo IM (Completed)      Encounter for screening mammogram for malignant neoplasm of breast        Relevant Orders    MA-SCREENING MAMMO BILAT W/IMPLANTS W/FIOR W/CAD               RTC: Return in about 6 months (around 9/18/2025).    I spent a total of 32 minutes with record review, exam, communication with the patient, communication with other providers, and documentation of this encounter.    Verbal consent was acquired by the patient to use [employer] ambient listening note generation during this visit Yes     Billing : secondary to the complexity of this patient's illnesses and their interactions.  All problems listed were discussed during the office visit, medications were evaluated and complexities were discussed as well as plan for the future.       PLEASE NOTE: This dictation was created using  voice recognition software. I have made every reasonable attempt to correct obvious errors, but I expect that there are errors of grammar and possibly content that I did not discover before finalizing the note.      Alyssa Rodriguez, DO  Geriatric and Internal Medicine  [facility]

## 2025-05-21 ENCOUNTER — TELEPHONE (OUTPATIENT)
Dept: HEALTH INFORMATION MANAGEMENT | Facility: OTHER | Age: 70
End: 2025-05-21

## 2025-06-29 ENCOUNTER — PATIENT MESSAGE (OUTPATIENT)
Dept: MEDICAL GROUP | Facility: PHYSICIAN GROUP | Age: 70
End: 2025-06-29
Payer: MEDICARE

## 2025-06-29 DIAGNOSIS — F41.9 ANXIETY: ICD-10-CM

## 2025-06-29 DIAGNOSIS — F13.20 BENZODIAZEPINE DEPENDENCE, EPISODIC (HCC): ICD-10-CM

## 2025-06-30 RX ORDER — ALPRAZOLAM 0.5 MG
0.5 TABLET ORAL
Qty: 45 TABLET | Refills: 1 | Status: SHIPPED | OUTPATIENT
Start: 2025-06-30 | End: 2025-09-28

## 2025-07-22 ENCOUNTER — HOSPITAL ENCOUNTER (OUTPATIENT)
Dept: RADIOLOGY | Facility: MEDICAL CENTER | Age: 70
End: 2025-07-22
Attending: INTERNAL MEDICINE
Payer: MEDICARE

## 2025-07-22 DIAGNOSIS — M85.89 OSTEOPENIA OF MULTIPLE SITES: ICD-10-CM

## 2025-07-22 DIAGNOSIS — Z12.31 ENCOUNTER FOR SCREENING MAMMOGRAM FOR MALIGNANT NEOPLASM OF BREAST: ICD-10-CM

## 2025-07-22 PROCEDURE — 77063 BREAST TOMOSYNTHESIS BI: CPT

## 2025-07-22 PROCEDURE — 77080 DXA BONE DENSITY AXIAL: CPT

## 2025-09-30 ENCOUNTER — APPOINTMENT (OUTPATIENT)
Dept: MEDICAL GROUP | Facility: PHYSICIAN GROUP | Age: 70
End: 2025-09-30
Payer: MEDICARE